# Patient Record
Sex: FEMALE | Race: WHITE | NOT HISPANIC OR LATINO | Employment: OTHER | URBAN - METROPOLITAN AREA
[De-identification: names, ages, dates, MRNs, and addresses within clinical notes are randomized per-mention and may not be internally consistent; named-entity substitution may affect disease eponyms.]

---

## 2020-01-01 ENCOUNTER — HOSPITAL ENCOUNTER (INPATIENT)
Facility: HOSPITAL | Age: 85
LOS: 8 days | Discharge: NURSING, REHAB OR LONG-TERM CARE | End: 2020-01-22
Attending: EMERGENCY MEDICINE | Admitting: HOSPITALIST

## 2020-01-01 ENCOUNTER — APPOINTMENT (OUTPATIENT)
Dept: NUCLEAR MEDICINE | Facility: HOSPITAL | Age: 85
End: 2020-01-01

## 2020-01-01 ENCOUNTER — APPOINTMENT (OUTPATIENT)
Dept: MRI IMAGING | Facility: HOSPITAL | Age: 85
End: 2020-01-01

## 2020-01-01 ENCOUNTER — ANESTHESIA EVENT (OUTPATIENT)
Dept: PERIOP | Facility: HOSPITAL | Age: 85
End: 2020-01-01

## 2020-01-01 ENCOUNTER — ANESTHESIA (OUTPATIENT)
Dept: PERIOP | Facility: HOSPITAL | Age: 85
End: 2020-01-01

## 2020-01-01 ENCOUNTER — APPOINTMENT (OUTPATIENT)
Dept: GENERAL RADIOLOGY | Facility: HOSPITAL | Age: 85
End: 2020-01-01

## 2020-01-01 ENCOUNTER — APPOINTMENT (OUTPATIENT)
Dept: CT IMAGING | Facility: HOSPITAL | Age: 85
End: 2020-01-01

## 2020-01-01 ENCOUNTER — HOSPITAL ENCOUNTER (INPATIENT)
Facility: HOSPITAL | Age: 85
LOS: 4 days | Discharge: NURSING, REHAB OR LONG-TERM CARE | End: 2020-04-09
Attending: EMERGENCY MEDICINE | Admitting: HOSPITALIST

## 2020-01-01 ENCOUNTER — HOSPITAL ENCOUNTER (INPATIENT)
Facility: HOSPITAL | Age: 85
LOS: 7 days | End: 2020-04-30
Attending: EMERGENCY MEDICINE | Admitting: ORTHOPAEDIC SURGERY

## 2020-01-01 ENCOUNTER — APPOINTMENT (OUTPATIENT)
Dept: CARDIOLOGY | Facility: HOSPITAL | Age: 85
End: 2020-01-01

## 2020-01-01 ENCOUNTER — HOSPITAL ENCOUNTER (EMERGENCY)
Facility: HOSPITAL | Age: 85
Discharge: HOME | End: 2020-04-02
Attending: EMERGENCY MEDICINE | Admitting: EMERGENCY MEDICINE

## 2020-01-01 ENCOUNTER — HOSPITAL ENCOUNTER (INPATIENT)
Facility: HOSPITAL | Age: 85
LOS: 3 days | End: 2020-05-03
Attending: HOSPITALIST | Admitting: HOSPITALIST

## 2020-01-01 VITALS
RESPIRATION RATE: 20 BRPM | WEIGHT: 186.07 LBS | HEIGHT: 63 IN | TEMPERATURE: 97.1 F | DIASTOLIC BLOOD PRESSURE: 62 MMHG | BODY MASS INDEX: 32.97 KG/M2 | OXYGEN SATURATION: 97 % | SYSTOLIC BLOOD PRESSURE: 125 MMHG | HEART RATE: 80 BPM

## 2020-01-01 VITALS
WEIGHT: 163.58 LBS | SYSTOLIC BLOOD PRESSURE: 120 MMHG | OXYGEN SATURATION: 94 % | TEMPERATURE: 98.5 F | DIASTOLIC BLOOD PRESSURE: 53 MMHG | HEART RATE: 77 BPM | RESPIRATION RATE: 18 BRPM | HEIGHT: 64 IN | BODY MASS INDEX: 27.93 KG/M2

## 2020-01-01 VITALS
OXYGEN SATURATION: 95 % | HEART RATE: 70 BPM | SYSTOLIC BLOOD PRESSURE: 140 MMHG | TEMPERATURE: 98.7 F | DIASTOLIC BLOOD PRESSURE: 72 MMHG | RESPIRATION RATE: 16 BRPM

## 2020-01-01 VITALS
RESPIRATION RATE: 14 BRPM | TEMPERATURE: 98 F | BODY MASS INDEX: 29.45 KG/M2 | HEART RATE: 70 BPM | DIASTOLIC BLOOD PRESSURE: 58 MMHG | WEIGHT: 166.2 LBS | HEIGHT: 63 IN | OXYGEN SATURATION: 94 % | SYSTOLIC BLOOD PRESSURE: 109 MMHG

## 2020-01-01 VITALS
TEMPERATURE: 101.9 F | SYSTOLIC BLOOD PRESSURE: 98 MMHG | WEIGHT: 186.07 LBS | HEIGHT: 63 IN | BODY MASS INDEX: 32.97 KG/M2 | DIASTOLIC BLOOD PRESSURE: 52 MMHG

## 2020-01-01 DIAGNOSIS — Z74.09 IMPAIRED MOBILITY: ICD-10-CM

## 2020-01-01 DIAGNOSIS — K56.609 SBO (SMALL BOWEL OBSTRUCTION) (HCC): ICD-10-CM

## 2020-01-01 DIAGNOSIS — M16.11 OSTEOARTHRITIS OF RIGHT HIP, UNSPECIFIED OSTEOARTHRITIS TYPE: ICD-10-CM

## 2020-01-01 DIAGNOSIS — T81.40XA POSTOPERATIVE INFECTION: ICD-10-CM

## 2020-01-01 DIAGNOSIS — D72.829 LEUKOCYTOSIS, UNSPECIFIED TYPE: ICD-10-CM

## 2020-01-01 DIAGNOSIS — L03.115 CELLULITIS OF RIGHT LOWER EXTREMITY: Primary | ICD-10-CM
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
## 2020-01-01 DIAGNOSIS — R10.31 RIGHT GROIN PAIN: Primary | ICD-10-CM

## 2020-01-01 DIAGNOSIS — T79.6XXA TRAUMATIC RHABDOMYOLYSIS, INITIAL ENCOUNTER (HCC): Primary | ICD-10-CM

## 2020-01-01 DIAGNOSIS — R79.82 ELEVATED C-REACTIVE PROTEIN (CRP): ICD-10-CM

## 2020-01-01 DIAGNOSIS — M25.551 RIGHT HIP PAIN: Primary | ICD-10-CM

## 2020-01-01 DIAGNOSIS — S82.841A CLOSED BIMALLEOLAR FRACTURE OF RIGHT ANKLE, INITIAL ENCOUNTER: ICD-10-CM

## 2020-01-01 DIAGNOSIS — S91.001A WOUND OF RIGHT ANKLE, INITIAL ENCOUNTER: ICD-10-CM

## 2020-01-01 LAB
ABO GROUP BLD: NORMAL
ALBUMIN SERPL-MCNC: 1.9 G/DL (ref 3.5–5.2)
ALBUMIN SERPL-MCNC: 2.3 G/DL (ref 3.5–5.2)
ALBUMIN SERPL-MCNC: 2.5 G/DL (ref 3.5–5.2)
ALBUMIN SERPL-MCNC: 2.7 G/DL (ref 3.5–5.2)
ALBUMIN SERPL-MCNC: 2.8 G/DL (ref 3.5–5.2)
ALBUMIN SERPL-MCNC: 3.2 G/DL (ref 3.5–5.2)
ALBUMIN SERPL-MCNC: 3.3 G/DL (ref 3.5–5.2)
ALBUMIN SERPL-MCNC: 3.7 G/DL (ref 3.5–5.2)
ALBUMIN/GLOB SERPL: 0.6 G/DL
ALBUMIN/GLOB SERPL: 0.7 G/DL
ALBUMIN/GLOB SERPL: 0.8 G/DL
ALBUMIN/GLOB SERPL: 1.1 G/DL
ALBUMIN/GLOB SERPL: 1.1 G/DL
ALBUMIN/GLOB SERPL: 1.2 G/DL
ALBUMIN/GLOB SERPL: 1.3 G/DL
ALP SERPL-CCNC: 46 U/L (ref 39–117)
ALP SERPL-CCNC: 54 U/L (ref 39–117)
ALP SERPL-CCNC: 61 U/L (ref 39–117)
ALP SERPL-CCNC: 68 U/L (ref 39–117)
ALP SERPL-CCNC: 75 U/L (ref 39–117)
ALP SERPL-CCNC: 77 U/L (ref 39–117)
ALP SERPL-CCNC: 78 U/L (ref 39–117)
ALT SERPL W P-5'-P-CCNC: 15 U/L (ref 1–33)
ALT SERPL W P-5'-P-CCNC: 16 U/L (ref 1–33)
ALT SERPL W P-5'-P-CCNC: 5 U/L (ref 1–33)
ALT SERPL W P-5'-P-CCNC: 9 U/L (ref 1–33)
ALT SERPL W P-5'-P-CCNC: <5 U/L (ref 1–33)
ANION GAP SERPL CALCULATED.3IONS-SCNC: 10.2 MMOL/L (ref 5–15)
ANION GAP SERPL CALCULATED.3IONS-SCNC: 10.2 MMOL/L (ref 5–15)
ANION GAP SERPL CALCULATED.3IONS-SCNC: 10.5 MMOL/L (ref 5–15)
ANION GAP SERPL CALCULATED.3IONS-SCNC: 11.2 MMOL/L (ref 5–15)
ANION GAP SERPL CALCULATED.3IONS-SCNC: 11.4 MMOL/L (ref 5–15)
ANION GAP SERPL CALCULATED.3IONS-SCNC: 11.4 MMOL/L (ref 5–15)
ANION GAP SERPL CALCULATED.3IONS-SCNC: 12.3 MMOL/L (ref 5–15)
ANION GAP SERPL CALCULATED.3IONS-SCNC: 12.6 MMOL/L (ref 5–15)
ANION GAP SERPL CALCULATED.3IONS-SCNC: 13.6 MMOL/L (ref 5–15)
ANION GAP SERPL CALCULATED.3IONS-SCNC: 14 MMOL/L (ref 5–15)
ANION GAP SERPL CALCULATED.3IONS-SCNC: 19.1 MMOL/L (ref 5–15)
ANION GAP SERPL CALCULATED.3IONS-SCNC: 4.9 MMOL/L (ref 5–15)
ANION GAP SERPL CALCULATED.3IONS-SCNC: 7.9 MMOL/L (ref 5–15)
ANION GAP SERPL CALCULATED.3IONS-SCNC: 8 MMOL/L (ref 5–15)
ANION GAP SERPL CALCULATED.3IONS-SCNC: 8.3 MMOL/L (ref 5–15)
ANION GAP SERPL CALCULATED.3IONS-SCNC: 8.5 MMOL/L (ref 5–15)
ANION GAP SERPL CALCULATED.3IONS-SCNC: 8.8 MMOL/L (ref 5–15)
ANION GAP SERPL CALCULATED.3IONS-SCNC: 8.9 MMOL/L (ref 5–15)
ANION GAP SERPL CALCULATED.3IONS-SCNC: 9.3 MMOL/L (ref 5–15)
ANION GAP SERPL CALCULATED.3IONS-SCNC: 9.5 MMOL/L (ref 5–15)
ANTI-C: NORMAL
ANTI-D: NORMAL
AORTIC DIMENSIONLESS INDEX: 0.2 (DI)
AST SERPL-CCNC: 13 U/L (ref 1–32)
AST SERPL-CCNC: 14 U/L (ref 1–32)
AST SERPL-CCNC: 15 U/L (ref 1–32)
AST SERPL-CCNC: 15 U/L (ref 1–32)
AST SERPL-CCNC: 23 U/L (ref 1–32)
AST SERPL-CCNC: 41 U/L (ref 1–32)
AST SERPL-CCNC: 7 U/L (ref 1–32)
BACTERIA BLD CULT: ABNORMAL
BACTERIA SPEC AEROBE CULT: ABNORMAL
BACTERIA SPEC AEROBE CULT: NORMAL
BACTERIA SPEC ANAEROBE CULT: NORMAL
BASOPHILS # BLD AUTO: 0.03 10*3/MM3 (ref 0–0.2)
BASOPHILS # BLD AUTO: 0.04 10*3/MM3 (ref 0–0.2)
BASOPHILS # BLD AUTO: 0.05 10*3/MM3 (ref 0–0.2)
BASOPHILS # BLD AUTO: 0.06 10*3/MM3 (ref 0–0.2)
BASOPHILS # BLD AUTO: 0.07 10*3/MM3 (ref 0–0.2)
BASOPHILS # BLD AUTO: 0.08 10*3/MM3 (ref 0–0.2)
BASOPHILS NFR BLD AUTO: 0.2 % (ref 0–1.5)
BASOPHILS NFR BLD AUTO: 0.3 % (ref 0–1.5)
BASOPHILS NFR BLD AUTO: 0.4 % (ref 0–1.5)
BASOPHILS NFR BLD AUTO: 0.5 % (ref 0–1.5)
BASOPHILS NFR BLD AUTO: 0.6 % (ref 0–1.5)
BASOPHILS NFR BLD AUTO: 0.7 % (ref 0–1.5)
BASOPHILS NFR BLD AUTO: 0.7 % (ref 0–1.5)
BASOPHILS NFR BLD AUTO: 0.8 % (ref 0–1.5)
BH BB BLOOD EXPIRATION DATE: NORMAL
BH BB BLOOD EXPIRATION DATE: NORMAL
BH BB BLOOD TYPE BARCODE: 600
BH BB BLOOD TYPE BARCODE: 600
BH BB DISPENSE STATUS: NORMAL
BH BB DISPENSE STATUS: NORMAL
BH BB PRODUCT CODE: NORMAL
BH BB PRODUCT CODE: NORMAL
BH BB UNIT NUMBER: NORMAL
BH BB UNIT NUMBER: NORMAL
BH CV ECHO MEAS - ACS: 0.7 CM
BH CV ECHO MEAS - AI DEC SLOPE: 424 CM/SEC^2
BH CV ECHO MEAS - AI MAX PG: 85 MMHG
BH CV ECHO MEAS - AI MAX VEL: 461 CM/SEC
BH CV ECHO MEAS - AI P1/2T: 318.5 MSEC
BH CV ECHO MEAS - AO MAX PG (FULL): 61.8 MMHG
BH CV ECHO MEAS - AO MAX PG: 66 MMHG
BH CV ECHO MEAS - AO MEAN PG (FULL): 37 MMHG
BH CV ECHO MEAS - AO MEAN PG: 39 MMHG
BH CV ECHO MEAS - AO ROOT AREA (BSA CORRECTED): 1.8
BH CV ECHO MEAS - AO ROOT AREA: 7.1 CM^2
BH CV ECHO MEAS - AO ROOT DIAM: 3 CM
BH CV ECHO MEAS - AO V2 MAX: 406 CM/SEC
BH CV ECHO MEAS - AO V2 MEAN: 295 CM/SEC
BH CV ECHO MEAS - AO V2 VTI: 88.4 CM
BH CV ECHO MEAS - AVA(I,A): 0.72 CM^2
BH CV ECHO MEAS - AVA(I,D): 0.72 CM^2
BH CV ECHO MEAS - AVA(V,A): 0.79 CM^2
BH CV ECHO MEAS - AVA(V,D): 0.79 CM^2
BH CV ECHO MEAS - BSA(HAYCOCK): 1.7 M^2
BH CV ECHO MEAS - BSA: 1.7 M^2
BH CV ECHO MEAS - BZI_BMI: 25.5 KILOGRAMS/M^2
BH CV ECHO MEAS - BZI_METRIC_HEIGHT: 160 CM
BH CV ECHO MEAS - BZI_METRIC_WEIGHT: 65.3 KG
BH CV ECHO MEAS - DESC AORTA: 1.7 CM
BH CV ECHO MEAS - EDV(CUBED): 103.8 ML
BH CV ECHO MEAS - EDV(MOD-SP2): 64 ML
BH CV ECHO MEAS - EDV(MOD-SP4): 96 ML
BH CV ECHO MEAS - EDV(TEICH): 102.4 ML
BH CV ECHO MEAS - EF(CUBED): 71.3 %
BH CV ECHO MEAS - EF(MOD-BP): 57 %
BH CV ECHO MEAS - EF(MOD-SP2): 57.8 %
BH CV ECHO MEAS - EF(MOD-SP4): 54.2 %
BH CV ECHO MEAS - EF(TEICH): 63 %
BH CV ECHO MEAS - ESV(CUBED): 29.8 ML
BH CV ECHO MEAS - ESV(MOD-SP2): 27 ML
BH CV ECHO MEAS - ESV(MOD-SP4): 44 ML
BH CV ECHO MEAS - ESV(TEICH): 37.9 ML
BH CV ECHO MEAS - FS: 34 %
BH CV ECHO MEAS - IVS/LVPW: 1.1
BH CV ECHO MEAS - IVSD: 1 CM
BH CV ECHO MEAS - LAT PEAK E' VEL: 8 CM/SEC
BH CV ECHO MEAS - LV DIASTOLIC VOL/BSA (35-75): 57.1 ML/M^2
BH CV ECHO MEAS - LV MASS(C)D: 153.4 GRAMS
BH CV ECHO MEAS - LV MASS(C)DI: 91.2 GRAMS/M^2
BH CV ECHO MEAS - LV MAX PG: 4.2 MMHG
BH CV ECHO MEAS - LV MEAN PG: 2 MMHG
BH CV ECHO MEAS - LV SYSTOLIC VOL/BSA (12-30): 26.2 ML/M^2
BH CV ECHO MEAS - LV V1 MAX: 102 CM/SEC
BH CV ECHO MEAS - LV V1 MEAN: 66.2 CM/SEC
BH CV ECHO MEAS - LV V1 VTI: 20.2 CM
BH CV ECHO MEAS - LVIDD: 4.7 CM
BH CV ECHO MEAS - LVIDS: 3.1 CM
BH CV ECHO MEAS - LVLD AP2: 7 CM
BH CV ECHO MEAS - LVLD AP4: 7.7 CM
BH CV ECHO MEAS - LVLS AP2: 6.2 CM
BH CV ECHO MEAS - LVLS AP4: 6.6 CM
BH CV ECHO MEAS - LVOT AREA (M): 3.1 CM^2
BH CV ECHO MEAS - LVOT AREA: 3.1 CM^2
BH CV ECHO MEAS - LVOT DIAM: 2 CM
BH CV ECHO MEAS - LVPWD: 0.9 CM
BH CV ECHO MEAS - MED PEAK E' VEL: 6 CM/SEC
BH CV ECHO MEAS - MR MAX PG: 157.3 MMHG
BH CV ECHO MEAS - MR MAX VEL: 627 CM/SEC
BH CV ECHO MEAS - MV A DUR: 0.16 SEC
BH CV ECHO MEAS - MV A MAX VEL: 107 CM/SEC
BH CV ECHO MEAS - MV DEC SLOPE: 633 CM/SEC^2
BH CV ECHO MEAS - MV DEC TIME: 160 SEC
BH CV ECHO MEAS - MV E MAX VEL: 88.8 CM/SEC
BH CV ECHO MEAS - MV E/A: 0.83
BH CV ECHO MEAS - MV MAX PG: 9.4 MMHG
BH CV ECHO MEAS - MV MEAN PG: 4 MMHG
BH CV ECHO MEAS - MV P1/2T MAX VEL: 112 CM/SEC
BH CV ECHO MEAS - MV P1/2T: 51.8 MSEC
BH CV ECHO MEAS - MV V2 MAX: 153 CM/SEC
BH CV ECHO MEAS - MV V2 MEAN: 88.8 CM/SEC
BH CV ECHO MEAS - MV V2 VTI: 28.1 CM
BH CV ECHO MEAS - MVA P1/2T LCG: 2 CM^2
BH CV ECHO MEAS - MVA(P1/2T): 4.2 CM^2
BH CV ECHO MEAS - MVA(VTI): 2.3 CM^2
BH CV ECHO MEAS - PA ACC TIME: 0.07 SEC
BH CV ECHO MEAS - PA MAX PG (FULL): 4.4 MMHG
BH CV ECHO MEAS - PA MAX PG: 9 MMHG
BH CV ECHO MEAS - PA PR(ACCEL): 47.1 MMHG
BH CV ECHO MEAS - PA V2 MAX: 150 CM/SEC
BH CV ECHO MEAS - PULM A REVS DUR: 0.19 SEC
BH CV ECHO MEAS - PULM A REVS VEL: 36.7 CM/SEC
BH CV ECHO MEAS - PULM DIAS VEL: 43.8 CM/SEC
BH CV ECHO MEAS - PULM S/D: 0.86
BH CV ECHO MEAS - PULM SYS VEL: 37.6 CM/SEC
BH CV ECHO MEAS - PVA(V,A): 1.8 CM^2
BH CV ECHO MEAS - PVA(V,D): 1.8 CM^2
BH CV ECHO MEAS - QP/QS: 0.78
BH CV ECHO MEAS - RAP SYSTOLE: 3 MMHG
BH CV ECHO MEAS - RV MAX PG: 4.6 MMHG
BH CV ECHO MEAS - RV MEAN PG: 2 MMHG
BH CV ECHO MEAS - RV V1 MAX: 107 CM/SEC
BH CV ECHO MEAS - RV V1 MEAN: 67 CM/SEC
BH CV ECHO MEAS - RV V1 VTI: 19.4 CM
BH CV ECHO MEAS - RVOT AREA: 2.5 CM^2
BH CV ECHO MEAS - RVOT DIAM: 1.8 CM
BH CV ECHO MEAS - SI(AO): 371.5 ML/M^2
BH CV ECHO MEAS - SI(CUBED): 44 ML/M^2
BH CV ECHO MEAS - SI(LVOT): 37.7 ML/M^2
BH CV ECHO MEAS - SI(MOD-SP2): 22 ML/M^2
BH CV ECHO MEAS - SI(MOD-SP4): 30.9 ML/M^2
BH CV ECHO MEAS - SI(TEICH): 38.3 ML/M^2
BH CV ECHO MEAS - SV(AO): 624.9 ML
BH CV ECHO MEAS - SV(CUBED): 74 ML
BH CV ECHO MEAS - SV(LVOT): 63.5 ML
BH CV ECHO MEAS - SV(MOD-SP2): 37 ML
BH CV ECHO MEAS - SV(MOD-SP4): 52 ML
BH CV ECHO MEAS - SV(RVOT): 49.4 ML
BH CV ECHO MEAS - SV(TEICH): 64.4 ML
BH CV ECHO MEASUREMENTS AVERAGE E/E' RATIO: 12.69
BH CV VAS BP RIGHT ARM: NORMAL MMHG
BH CV XLRA - RV BASE: 2.7 CM
BH CV XLRA - RV LENGTH: 7.2 CM
BH CV XLRA - RV MID: 1.6 CM
BH CV XLRA - TDI S': 20 CM/SEC
BILIRUB SERPL-MCNC: 0.3 MG/DL (ref 0.2–1.2)
BILIRUB SERPL-MCNC: 0.3 MG/DL (ref 0.2–1.2)
BILIRUB SERPL-MCNC: 0.5 MG/DL (ref 0.2–1.2)
BILIRUB SERPL-MCNC: 0.5 MG/DL (ref 0.2–1.2)
BILIRUB SERPL-MCNC: 0.7 MG/DL (ref 0.2–1.2)
BILIRUB SERPL-MCNC: 0.8 MG/DL (ref 0.2–1.2)
BILIRUB SERPL-MCNC: 1.4 MG/DL (ref 0.2–1.2)
BILIRUB UR QL STRIP: NEGATIVE
BILIRUB UR QL STRIP: NEGATIVE
BLD GP AB SCN SERPL QL: POSITIVE
BUN BLD-MCNC: 10 MG/DL (ref 8–23)
BUN BLD-MCNC: 12 MG/DL (ref 8–23)
BUN BLD-MCNC: 12 MG/DL (ref 8–23)
BUN BLD-MCNC: 13 MG/DL (ref 8–23)
BUN BLD-MCNC: 15 MG/DL (ref 8–23)
BUN BLD-MCNC: 16 MG/DL (ref 8–23)
BUN BLD-MCNC: 20 MG/DL (ref 8–23)
BUN BLD-MCNC: 21 MG/DL (ref 8–23)
BUN BLD-MCNC: 23 MG/DL (ref 8–23)
BUN BLD-MCNC: 8 MG/DL (ref 8–23)
BUN BLD-MCNC: 8 MG/DL (ref 8–23)
BUN BLD-MCNC: 9 MG/DL (ref 8–23)
BUN BLD-MCNC: 9 MG/DL (ref 8–23)
BUN/CREAT SERPL: 11.4 (ref 7–25)
BUN/CREAT SERPL: 11.9 (ref 7–25)
BUN/CREAT SERPL: 12.2 (ref 7–25)
BUN/CREAT SERPL: 13.6 (ref 7–25)
BUN/CREAT SERPL: 14.3 (ref 7–25)
BUN/CREAT SERPL: 14.8 (ref 7–25)
BUN/CREAT SERPL: 14.8 (ref 7–25)
BUN/CREAT SERPL: 15.1 (ref 7–25)
BUN/CREAT SERPL: 15.1 (ref 7–25)
BUN/CREAT SERPL: 17 (ref 7–25)
BUN/CREAT SERPL: 17.2 (ref 7–25)
BUN/CREAT SERPL: 17.2 (ref 7–25)
BUN/CREAT SERPL: 17.8 (ref 7–25)
BUN/CREAT SERPL: 17.9 (ref 7–25)
BUN/CREAT SERPL: 19.1 (ref 7–25)
BUN/CREAT SERPL: 19.5 (ref 7–25)
BUN/CREAT SERPL: 19.8 (ref 7–25)
BUN/CREAT SERPL: 20 (ref 7–25)
BUN/CREAT SERPL: 20 (ref 7–25)
BUN/CREAT SERPL: 20.3 (ref 7–25)
C AG RBC QL: NEGATIVE
CALCIUM SPEC-SCNC: 7.9 MG/DL (ref 8.2–9.6)
CALCIUM SPEC-SCNC: 8.1 MG/DL (ref 8.2–9.6)
CALCIUM SPEC-SCNC: 8.1 MG/DL (ref 8.2–9.6)
CALCIUM SPEC-SCNC: 8.2 MG/DL (ref 8.2–9.6)
CALCIUM SPEC-SCNC: 8.2 MG/DL (ref 8.2–9.6)
CALCIUM SPEC-SCNC: 8.3 MG/DL (ref 8.2–9.6)
CALCIUM SPEC-SCNC: 8.4 MG/DL (ref 8.2–9.6)
CALCIUM SPEC-SCNC: 8.5 MG/DL (ref 8.2–9.6)
CALCIUM SPEC-SCNC: 8.6 MG/DL (ref 8.2–9.6)
CALCIUM SPEC-SCNC: 8.9 MG/DL (ref 8.2–9.6)
CALCIUM SPEC-SCNC: 9 MG/DL (ref 8.2–9.6)
CALCIUM SPEC-SCNC: 9.7 MG/DL (ref 8.2–9.6)
CHLORIDE SERPL-SCNC: 100 MMOL/L (ref 98–107)
CHLORIDE SERPL-SCNC: 101 MMOL/L (ref 98–107)
CHLORIDE SERPL-SCNC: 102 MMOL/L (ref 98–107)
CHLORIDE SERPL-SCNC: 103 MMOL/L (ref 98–107)
CHLORIDE SERPL-SCNC: 104 MMOL/L (ref 98–107)
CHLORIDE SERPL-SCNC: 104 MMOL/L (ref 98–107)
CHLORIDE SERPL-SCNC: 105 MMOL/L (ref 98–107)
CHLORIDE SERPL-SCNC: 97 MMOL/L (ref 98–107)
CHLORIDE SERPL-SCNC: 98 MMOL/L (ref 98–107)
CHLORIDE SERPL-SCNC: 99 MMOL/L (ref 98–107)
CHOLEST SERPL-MCNC: 110 MG/DL (ref 0–200)
CHOLEST SERPL-MCNC: 116 MG/DL (ref 0–200)
CK SERPL-CCNC: 1063 U/L (ref 20–180)
CK SERPL-CCNC: 107 U/L (ref 20–180)
CK SERPL-CCNC: 153 U/L (ref 20–180)
CK SERPL-CCNC: 207 U/L (ref 20–180)
CK SERPL-CCNC: 253 U/L (ref 20–180)
CK SERPL-CCNC: 319 U/L (ref 20–180)
CK SERPL-CCNC: 387 U/L (ref 20–180)
CLARITY UR: CLEAR
CLARITY UR: CLEAR
CO2 SERPL-SCNC: 15.9 MMOL/L (ref 22–29)
CO2 SERPL-SCNC: 19 MMOL/L (ref 22–29)
CO2 SERPL-SCNC: 19.4 MMOL/L (ref 22–29)
CO2 SERPL-SCNC: 23.5 MMOL/L (ref 22–29)
CO2 SERPL-SCNC: 23.6 MMOL/L (ref 22–29)
CO2 SERPL-SCNC: 23.7 MMOL/L (ref 22–29)
CO2 SERPL-SCNC: 24.6 MMOL/L (ref 22–29)
CO2 SERPL-SCNC: 25.2 MMOL/L (ref 22–29)
CO2 SERPL-SCNC: 25.4 MMOL/L (ref 22–29)
CO2 SERPL-SCNC: 25.7 MMOL/L (ref 22–29)
CO2 SERPL-SCNC: 26.1 MMOL/L (ref 22–29)
CO2 SERPL-SCNC: 26.7 MMOL/L (ref 22–29)
CO2 SERPL-SCNC: 26.8 MMOL/L (ref 22–29)
CO2 SERPL-SCNC: 27.1 MMOL/L (ref 22–29)
CO2 SERPL-SCNC: 27.5 MMOL/L (ref 22–29)
CO2 SERPL-SCNC: 27.5 MMOL/L (ref 22–29)
CO2 SERPL-SCNC: 27.8 MMOL/L (ref 22–29)
CO2 SERPL-SCNC: 28 MMOL/L (ref 22–29)
CO2 SERPL-SCNC: 28.1 MMOL/L (ref 22–29)
CO2 SERPL-SCNC: 28.8 MMOL/L (ref 22–29)
COLOR UR: YELLOW
COLOR UR: YELLOW
CREAT BLD-MCNC: 0.53 MG/DL (ref 0.57–1)
CREAT BLD-MCNC: 0.61 MG/DL (ref 0.57–1)
CREAT BLD-MCNC: 0.64 MG/DL (ref 0.57–1)
CREAT BLD-MCNC: 0.65 MG/DL (ref 0.57–1)
CREAT BLD-MCNC: 0.66 MG/DL (ref 0.57–1)
CREAT BLD-MCNC: 0.67 MG/DL (ref 0.57–1)
CREAT BLD-MCNC: 0.67 MG/DL (ref 0.57–1)
CREAT BLD-MCNC: 0.8 MG/DL (ref 0.57–1)
CREAT BLD-MCNC: 0.81 MG/DL (ref 0.57–1)
CREAT BLD-MCNC: 0.82 MG/DL (ref 0.57–1)
CREAT BLD-MCNC: 0.82 MG/DL (ref 0.57–1)
CREAT BLD-MCNC: 0.84 MG/DL (ref 0.57–1)
CREAT BLD-MCNC: 0.86 MG/DL (ref 0.57–1)
CREAT BLD-MCNC: 0.87 MG/DL (ref 0.57–1)
CREAT BLD-MCNC: 0.9 MG/DL (ref 0.57–1)
CREAT BLD-MCNC: 0.93 MG/DL (ref 0.57–1)
CREAT BLD-MCNC: 0.94 MG/DL (ref 0.57–1)
CREAT BLD-MCNC: 1.1 MG/DL (ref 0.57–1)
CREAT BLD-MCNC: 1.35 MG/DL (ref 0.57–1)
CREAT BLD-MCNC: 2.01 MG/DL (ref 0.57–1)
CROSSMATCH INTERPRETATION: NORMAL
CROSSMATCH INTERPRETATION: NORMAL
CRP SERPL-MCNC: 0.92 MG/DL (ref 0–0.5)
CRP SERPL-MCNC: 8.93 MG/DL (ref 0–0.5)
D-LACTATE SERPL-SCNC: 1.1 MMOL/L (ref 0.5–2)
D-LACTATE SERPL-SCNC: 1.7 MMOL/L (ref 0.5–2)
DEPRECATED RDW RBC AUTO: 40.4 FL (ref 37–54)
DEPRECATED RDW RBC AUTO: 41 FL (ref 37–54)
DEPRECATED RDW RBC AUTO: 41 FL (ref 37–54)
DEPRECATED RDW RBC AUTO: 41.3 FL (ref 37–54)
DEPRECATED RDW RBC AUTO: 42.1 FL (ref 37–54)
DEPRECATED RDW RBC AUTO: 42.3 FL (ref 37–54)
DEPRECATED RDW RBC AUTO: 42.7 FL (ref 37–54)
DEPRECATED RDW RBC AUTO: 43.6 FL (ref 37–54)
DEPRECATED RDW RBC AUTO: 44.1 FL (ref 37–54)
DEPRECATED RDW RBC AUTO: 44.1 FL (ref 37–54)
DEPRECATED RDW RBC AUTO: 44.2 FL (ref 37–54)
DEPRECATED RDW RBC AUTO: 44.7 FL (ref 37–54)
DEPRECATED RDW RBC AUTO: 44.9 FL (ref 37–54)
DEPRECATED RDW RBC AUTO: 45 FL (ref 37–54)
DEPRECATED RDW RBC AUTO: 45 FL (ref 37–54)
DEPRECATED RDW RBC AUTO: 45.5 FL (ref 37–54)
DEPRECATED RDW RBC AUTO: 45.8 FL (ref 37–54)
DEPRECATED RDW RBC AUTO: 45.9 FL (ref 37–54)
DEPRECATED RDW RBC AUTO: 46 FL (ref 37–54)
DEPRECATED RDW RBC AUTO: 47.2 FL (ref 37–54)
EOSINOPHIL # BLD AUTO: 0 10*3/MM3 (ref 0–0.4)
EOSINOPHIL # BLD AUTO: 0.01 10*3/MM3 (ref 0–0.4)
EOSINOPHIL # BLD AUTO: 0.04 10*3/MM3 (ref 0–0.4)
EOSINOPHIL # BLD AUTO: 0.15 10*3/MM3 (ref 0–0.4)
EOSINOPHIL # BLD AUTO: 0.2 10*3/MM3 (ref 0–0.4)
EOSINOPHIL # BLD AUTO: 0.22 10*3/MM3 (ref 0–0.4)
EOSINOPHIL # BLD AUTO: 0.25 10*3/MM3 (ref 0–0.4)
EOSINOPHIL # BLD AUTO: 0.26 10*3/MM3 (ref 0–0.4)
EOSINOPHIL # BLD AUTO: 0.29 10*3/MM3 (ref 0–0.4)
EOSINOPHIL # BLD AUTO: 0.36 10*3/MM3 (ref 0–0.4)
EOSINOPHIL # BLD AUTO: 0.45 10*3/MM3 (ref 0–0.4)
EOSINOPHIL # BLD AUTO: 0.6 10*3/MM3 (ref 0–0.4)
EOSINOPHIL # BLD AUTO: 0.73 10*3/MM3 (ref 0–0.4)
EOSINOPHIL # BLD AUTO: 0.76 10*3/MM3 (ref 0–0.4)
EOSINOPHIL # BLD AUTO: 1 10*3/MM3 (ref 0–0.4)
EOSINOPHIL # BLD AUTO: 1.02 10*3/MM3 (ref 0–0.4)
EOSINOPHIL NFR BLD AUTO: 0 % (ref 0.3–6.2)
EOSINOPHIL NFR BLD AUTO: 0.1 % (ref 0.3–6.2)
EOSINOPHIL NFR BLD AUTO: 0.2 % (ref 0.3–6.2)
EOSINOPHIL NFR BLD AUTO: 1.3 % (ref 0.3–6.2)
EOSINOPHIL NFR BLD AUTO: 2.3 % (ref 0.3–6.2)
EOSINOPHIL NFR BLD AUTO: 2.4 % (ref 0.3–6.2)
EOSINOPHIL NFR BLD AUTO: 2.5 % (ref 0.3–6.2)
EOSINOPHIL NFR BLD AUTO: 3 % (ref 0.3–6.2)
EOSINOPHIL NFR BLD AUTO: 3.2 % (ref 0.3–6.2)
EOSINOPHIL NFR BLD AUTO: 3.5 % (ref 0.3–6.2)
EOSINOPHIL NFR BLD AUTO: 3.8 % (ref 0.3–6.2)
EOSINOPHIL NFR BLD AUTO: 4.2 % (ref 0.3–6.2)
EOSINOPHIL NFR BLD AUTO: 7.2 % (ref 0.3–6.2)
EOSINOPHIL NFR BLD AUTO: 9.4 % (ref 0.3–6.2)
EOSINOPHIL NFR BLD AUTO: 9.5 % (ref 0.3–6.2)
EOSINOPHIL NFR BLD AUTO: 9.7 % (ref 0.3–6.2)
ERYTHROCYTE [DISTWIDTH] IN BLOOD BY AUTOMATED COUNT: 13 % (ref 12.3–15.4)
ERYTHROCYTE [DISTWIDTH] IN BLOOD BY AUTOMATED COUNT: 13.1 % (ref 12.3–15.4)
ERYTHROCYTE [DISTWIDTH] IN BLOOD BY AUTOMATED COUNT: 13.2 % (ref 12.3–15.4)
ERYTHROCYTE [DISTWIDTH] IN BLOOD BY AUTOMATED COUNT: 13.3 % (ref 12.3–15.4)
ERYTHROCYTE [DISTWIDTH] IN BLOOD BY AUTOMATED COUNT: 13.6 % (ref 12.3–15.4)
ERYTHROCYTE [DISTWIDTH] IN BLOOD BY AUTOMATED COUNT: 13.6 % (ref 12.3–15.4)
ERYTHROCYTE [DISTWIDTH] IN BLOOD BY AUTOMATED COUNT: 13.8 % (ref 12.3–15.4)
ERYTHROCYTE [DISTWIDTH] IN BLOOD BY AUTOMATED COUNT: 13.9 % (ref 12.3–15.4)
ERYTHROCYTE [DISTWIDTH] IN BLOOD BY AUTOMATED COUNT: 14 % (ref 12.3–15.4)
ERYTHROCYTE [DISTWIDTH] IN BLOOD BY AUTOMATED COUNT: 14.1 % (ref 12.3–15.4)
ERYTHROCYTE [DISTWIDTH] IN BLOOD BY AUTOMATED COUNT: 14.1 % (ref 12.3–15.4)
ERYTHROCYTE [DISTWIDTH] IN BLOOD BY AUTOMATED COUNT: 14.2 % (ref 12.3–15.4)
ERYTHROCYTE [DISTWIDTH] IN BLOOD BY AUTOMATED COUNT: 14.4 % (ref 12.3–15.4)
ERYTHROCYTE [DISTWIDTH] IN BLOOD BY AUTOMATED COUNT: 14.7 % (ref 12.3–15.4)
ERYTHROCYTE [SEDIMENTATION RATE] IN BLOOD: 10 MM/HR (ref 0–30)
ERYTHROCYTE [SEDIMENTATION RATE] IN BLOOD: 11 MM/HR (ref 0–30)
GFR SERPL CREATININE-BSD FRML MDRD: 107 ML/MIN/1.73
GFR SERPL CREATININE-BSD FRML MDRD: 23 ML/MIN/1.73
GFR SERPL CREATININE-BSD FRML MDRD: 36 ML/MIN/1.73
GFR SERPL CREATININE-BSD FRML MDRD: 46 ML/MIN/1.73
GFR SERPL CREATININE-BSD FRML MDRD: 55 ML/MIN/1.73
GFR SERPL CREATININE-BSD FRML MDRD: 56 ML/MIN/1.73
GFR SERPL CREATININE-BSD FRML MDRD: 58 ML/MIN/1.73
GFR SERPL CREATININE-BSD FRML MDRD: 60 ML/MIN/1.73
GFR SERPL CREATININE-BSD FRML MDRD: 61 ML/MIN/1.73
GFR SERPL CREATININE-BSD FRML MDRD: 63 ML/MIN/1.73
GFR SERPL CREATININE-BSD FRML MDRD: 65 ML/MIN/1.73
GFR SERPL CREATININE-BSD FRML MDRD: 65 ML/MIN/1.73
GFR SERPL CREATININE-BSD FRML MDRD: 66 ML/MIN/1.73
GFR SERPL CREATININE-BSD FRML MDRD: 67 ML/MIN/1.73
GFR SERPL CREATININE-BSD FRML MDRD: 82 ML/MIN/1.73
GFR SERPL CREATININE-BSD FRML MDRD: 82 ML/MIN/1.73
GFR SERPL CREATININE-BSD FRML MDRD: 83 ML/MIN/1.73
GFR SERPL CREATININE-BSD FRML MDRD: 85 ML/MIN/1.73
GFR SERPL CREATININE-BSD FRML MDRD: 86 ML/MIN/1.73
GFR SERPL CREATININE-BSD FRML MDRD: 91 ML/MIN/1.73
GLOBULIN UR ELPH-MCNC: 2.3 GM/DL
GLOBULIN UR ELPH-MCNC: 2.6 GM/DL
GLOBULIN UR ELPH-MCNC: 2.9 GM/DL
GLOBULIN UR ELPH-MCNC: 3.1 GM/DL
GLOBULIN UR ELPH-MCNC: 3.1 GM/DL
GLOBULIN UR ELPH-MCNC: 3.4 GM/DL
GLOBULIN UR ELPH-MCNC: 3.9 GM/DL
GLUCOSE BLD-MCNC: 100 MG/DL (ref 65–99)
GLUCOSE BLD-MCNC: 101 MG/DL (ref 65–99)
GLUCOSE BLD-MCNC: 101 MG/DL (ref 65–99)
GLUCOSE BLD-MCNC: 103 MG/DL (ref 65–99)
GLUCOSE BLD-MCNC: 105 MG/DL (ref 65–99)
GLUCOSE BLD-MCNC: 112 MG/DL (ref 65–99)
GLUCOSE BLD-MCNC: 115 MG/DL (ref 65–99)
GLUCOSE BLD-MCNC: 119 MG/DL (ref 65–99)
GLUCOSE BLD-MCNC: 136 MG/DL (ref 65–99)
GLUCOSE BLD-MCNC: 80 MG/DL (ref 65–99)
GLUCOSE BLD-MCNC: 82 MG/DL (ref 65–99)
GLUCOSE BLD-MCNC: 84 MG/DL (ref 65–99)
GLUCOSE BLD-MCNC: 90 MG/DL (ref 65–99)
GLUCOSE BLD-MCNC: 91 MG/DL (ref 65–99)
GLUCOSE BLD-MCNC: 97 MG/DL (ref 65–99)
GLUCOSE BLD-MCNC: 98 MG/DL (ref 65–99)
GLUCOSE UR STRIP-MCNC: NEGATIVE MG/DL
GLUCOSE UR STRIP-MCNC: NEGATIVE MG/DL
GRAM STN SPEC: ABNORMAL
HBA1C MFR BLD: 4.7 % (ref 4.8–5.6)
HBA1C MFR BLD: 5 % (ref 4.8–5.6)
HCT VFR BLD AUTO: 23.8 % (ref 34–46.6)
HCT VFR BLD AUTO: 24.9 % (ref 34–46.6)
HCT VFR BLD AUTO: 26.3 % (ref 34–46.6)
HCT VFR BLD AUTO: 26.3 % (ref 34–46.6)
HCT VFR BLD AUTO: 28.3 % (ref 34–46.6)
HCT VFR BLD AUTO: 29.1 % (ref 34–46.6)
HCT VFR BLD AUTO: 30 % (ref 34–46.6)
HCT VFR BLD AUTO: 30.4 % (ref 34–46.6)
HCT VFR BLD AUTO: 30.9 % (ref 34–46.6)
HCT VFR BLD AUTO: 31 % (ref 34–46.6)
HCT VFR BLD AUTO: 31.3 % (ref 34–46.6)
HCT VFR BLD AUTO: 31.9 % (ref 34–46.6)
HCT VFR BLD AUTO: 32 % (ref 34–46.6)
HCT VFR BLD AUTO: 32.4 % (ref 34–46.6)
HCT VFR BLD AUTO: 32.8 % (ref 34–46.6)
HCT VFR BLD AUTO: 33.4 % (ref 34–46.6)
HCT VFR BLD AUTO: 33.6 % (ref 34–46.6)
HCT VFR BLD AUTO: 34.6 % (ref 34–46.6)
HCT VFR BLD AUTO: 35 % (ref 34–46.6)
HCT VFR BLD AUTO: 39.4 % (ref 34–46.6)
HDLC SERPL-MCNC: 61 MG/DL (ref 40–60)
HDLC SERPL-MCNC: 66 MG/DL (ref 40–60)
HGB BLD-MCNC: 10.2 G/DL (ref 12–15.9)
HGB BLD-MCNC: 10.2 G/DL (ref 12–15.9)
HGB BLD-MCNC: 10.3 G/DL (ref 12–15.9)
HGB BLD-MCNC: 10.5 G/DL (ref 12–15.9)
HGB BLD-MCNC: 10.7 G/DL (ref 12–15.9)
HGB BLD-MCNC: 10.8 G/DL (ref 12–15.9)
HGB BLD-MCNC: 11 G/DL (ref 12–15.9)
HGB BLD-MCNC: 11 G/DL (ref 12–15.9)
HGB BLD-MCNC: 11.1 G/DL (ref 12–15.9)
HGB BLD-MCNC: 11.2 G/DL (ref 12–15.9)
HGB BLD-MCNC: 11.4 G/DL (ref 12–15.9)
HGB BLD-MCNC: 13.2 G/DL (ref 12–15.9)
HGB BLD-MCNC: 7.7 G/DL (ref 12–15.9)
HGB BLD-MCNC: 8.2 G/DL (ref 12–15.9)
HGB BLD-MCNC: 8.8 G/DL (ref 12–15.9)
HGB BLD-MCNC: 8.9 G/DL (ref 12–15.9)
HGB BLD-MCNC: 9.3 G/DL (ref 12–15.9)
HGB BLD-MCNC: 9.5 G/DL (ref 12–15.9)
HGB UR QL STRIP.AUTO: NEGATIVE
HGB UR QL STRIP.AUTO: NEGATIVE
IMM GRANULOCYTES # BLD AUTO: 0.03 10*3/MM3 (ref 0–0.05)
IMM GRANULOCYTES # BLD AUTO: 0.03 10*3/MM3 (ref 0–0.05)
IMM GRANULOCYTES # BLD AUTO: 0.04 10*3/MM3 (ref 0–0.05)
IMM GRANULOCYTES # BLD AUTO: 0.04 10*3/MM3 (ref 0–0.05)
IMM GRANULOCYTES # BLD AUTO: 0.05 10*3/MM3 (ref 0–0.05)
IMM GRANULOCYTES # BLD AUTO: 0.06 10*3/MM3 (ref 0–0.05)
IMM GRANULOCYTES # BLD AUTO: 0.07 10*3/MM3 (ref 0–0.05)
IMM GRANULOCYTES # BLD AUTO: 0.09 10*3/MM3 (ref 0–0.05)
IMM GRANULOCYTES # BLD AUTO: 0.09 10*3/MM3 (ref 0–0.05)
IMM GRANULOCYTES # BLD AUTO: 0.1 10*3/MM3 (ref 0–0.05)
IMM GRANULOCYTES # BLD AUTO: 0.11 10*3/MM3 (ref 0–0.05)
IMM GRANULOCYTES # BLD AUTO: 0.12 10*3/MM3 (ref 0–0.05)
IMM GRANULOCYTES NFR BLD AUTO: 0.4 % (ref 0–0.5)
IMM GRANULOCYTES NFR BLD AUTO: 0.5 % (ref 0–0.5)
IMM GRANULOCYTES NFR BLD AUTO: 0.6 % (ref 0–0.5)
IMM GRANULOCYTES NFR BLD AUTO: 0.7 % (ref 0–0.5)
IMM GRANULOCYTES NFR BLD AUTO: 0.7 % (ref 0–0.5)
IMM GRANULOCYTES NFR BLD AUTO: 0.8 % (ref 0–0.5)
IMM GRANULOCYTES NFR BLD AUTO: 0.8 % (ref 0–0.5)
IMM GRANULOCYTES NFR BLD AUTO: 1 % (ref 0–0.5)
ISOLATED FROM: ABNORMAL
KETONES UR QL STRIP: ABNORMAL
KETONES UR QL STRIP: NEGATIVE
LDLC SERPL CALC-MCNC: 38 MG/DL (ref 0–100)
LDLC SERPL CALC-MCNC: 39 MG/DL (ref 0–100)
LDLC/HDLC SERPL: 0.58 {RATIO}
LDLC/HDLC SERPL: 0.63 {RATIO}
LEFT ATRIUM VOLUME INDEX: 43 ML/M2
LEFT ATRIUM VOLUME: 67 CM3
LEUKOCYTE ESTERASE UR QL STRIP.AUTO: NEGATIVE
LEUKOCYTE ESTERASE UR QL STRIP.AUTO: NEGATIVE
LYMPHOCYTES # BLD AUTO: 0.99 10*3/MM3 (ref 0.7–3.1)
LYMPHOCYTES # BLD AUTO: 1.06 10*3/MM3 (ref 0.7–3.1)
LYMPHOCYTES # BLD AUTO: 1.15 10*3/MM3 (ref 0.7–3.1)
LYMPHOCYTES # BLD AUTO: 1.19 10*3/MM3 (ref 0.7–3.1)
LYMPHOCYTES # BLD AUTO: 1.25 10*3/MM3 (ref 0.7–3.1)
LYMPHOCYTES # BLD AUTO: 1.46 10*3/MM3 (ref 0.7–3.1)
LYMPHOCYTES # BLD AUTO: 1.5 10*3/MM3 (ref 0.7–3.1)
LYMPHOCYTES # BLD AUTO: 1.55 10*3/MM3 (ref 0.7–3.1)
LYMPHOCYTES # BLD AUTO: 1.59 10*3/MM3 (ref 0.7–3.1)
LYMPHOCYTES # BLD AUTO: 1.71 10*3/MM3 (ref 0.7–3.1)
LYMPHOCYTES # BLD AUTO: 1.72 10*3/MM3 (ref 0.7–3.1)
LYMPHOCYTES # BLD AUTO: 1.85 10*3/MM3 (ref 0.7–3.1)
LYMPHOCYTES # BLD AUTO: 1.87 10*3/MM3 (ref 0.7–3.1)
LYMPHOCYTES # BLD AUTO: 1.9 10*3/MM3 (ref 0.7–3.1)
LYMPHOCYTES # BLD AUTO: 2.03 10*3/MM3 (ref 0.7–3.1)
LYMPHOCYTES # BLD AUTO: 2.12 10*3/MM3 (ref 0.7–3.1)
LYMPHOCYTES NFR BLD AUTO: 12 % (ref 19.6–45.3)
LYMPHOCYTES NFR BLD AUTO: 12.8 % (ref 19.6–45.3)
LYMPHOCYTES NFR BLD AUTO: 13.4 % (ref 19.6–45.3)
LYMPHOCYTES NFR BLD AUTO: 14.4 % (ref 19.6–45.3)
LYMPHOCYTES NFR BLD AUTO: 15.3 % (ref 19.6–45.3)
LYMPHOCYTES NFR BLD AUTO: 17.1 % (ref 19.6–45.3)
LYMPHOCYTES NFR BLD AUTO: 18 % (ref 19.6–45.3)
LYMPHOCYTES NFR BLD AUTO: 18 % (ref 19.6–45.3)
LYMPHOCYTES NFR BLD AUTO: 19.3 % (ref 19.6–45.3)
LYMPHOCYTES NFR BLD AUTO: 19.9 % (ref 19.6–45.3)
LYMPHOCYTES NFR BLD AUTO: 20.6 % (ref 19.6–45.3)
LYMPHOCYTES NFR BLD AUTO: 22.5 % (ref 19.6–45.3)
LYMPHOCYTES NFR BLD AUTO: 7.3 % (ref 19.6–45.3)
LYMPHOCYTES NFR BLD AUTO: 7.9 % (ref 19.6–45.3)
LYMPHOCYTES NFR BLD AUTO: 8.4 % (ref 19.6–45.3)
LYMPHOCYTES NFR BLD AUTO: 9.7 % (ref 19.6–45.3)
MAGNESIUM SERPL-MCNC: 1.5 MG/DL (ref 1.7–2.3)
MAGNESIUM SERPL-MCNC: 1.6 MG/DL (ref 1.7–2.3)
MAGNESIUM SERPL-MCNC: 1.8 MG/DL (ref 1.7–2.3)
MAGNESIUM SERPL-MCNC: 1.9 MG/DL (ref 1.7–2.3)
MAGNESIUM SERPL-MCNC: 2 MG/DL (ref 1.7–2.3)
MAXIMAL PREDICTED HEART RATE: 124 BPM
MCH RBC QN AUTO: 28.2 PG (ref 26.6–33)
MCH RBC QN AUTO: 28.6 PG (ref 26.6–33)
MCH RBC QN AUTO: 28.7 PG (ref 26.6–33)
MCH RBC QN AUTO: 28.8 PG (ref 26.6–33)
MCH RBC QN AUTO: 28.9 PG (ref 26.6–33)
MCH RBC QN AUTO: 28.9 PG (ref 26.6–33)
MCH RBC QN AUTO: 29 PG (ref 26.6–33)
MCH RBC QN AUTO: 29 PG (ref 26.6–33)
MCH RBC QN AUTO: 29.1 PG (ref 26.6–33)
MCH RBC QN AUTO: 29.1 PG (ref 26.6–33)
MCH RBC QN AUTO: 29.3 PG (ref 26.6–33)
MCH RBC QN AUTO: 29.3 PG (ref 26.6–33)
MCH RBC QN AUTO: 29.4 PG (ref 26.6–33)
MCH RBC QN AUTO: 29.6 PG (ref 26.6–33)
MCH RBC QN AUTO: 29.6 PG (ref 26.6–33)
MCH RBC QN AUTO: 29.7 PG (ref 26.6–33)
MCHC RBC AUTO-ENTMCNC: 32 G/DL (ref 31.5–35.7)
MCHC RBC AUTO-ENTMCNC: 32.4 G/DL (ref 31.5–35.7)
MCHC RBC AUTO-ENTMCNC: 32.4 G/DL (ref 31.5–35.7)
MCHC RBC AUTO-ENTMCNC: 32.6 G/DL (ref 31.5–35.7)
MCHC RBC AUTO-ENTMCNC: 32.6 G/DL (ref 31.5–35.7)
MCHC RBC AUTO-ENTMCNC: 32.9 G/DL (ref 31.5–35.7)
MCHC RBC AUTO-ENTMCNC: 33 G/DL (ref 31.5–35.7)
MCHC RBC AUTO-ENTMCNC: 33.2 G/DL (ref 31.5–35.7)
MCHC RBC AUTO-ENTMCNC: 33.3 G/DL (ref 31.5–35.7)
MCHC RBC AUTO-ENTMCNC: 33.3 G/DL (ref 31.5–35.7)
MCHC RBC AUTO-ENTMCNC: 33.5 G/DL (ref 31.5–35.7)
MCHC RBC AUTO-ENTMCNC: 33.6 G/DL (ref 31.5–35.7)
MCHC RBC AUTO-ENTMCNC: 33.8 G/DL (ref 31.5–35.7)
MCHC RBC AUTO-ENTMCNC: 34 G/DL (ref 31.5–35.7)
MCHC RBC AUTO-ENTMCNC: 34.4 G/DL (ref 31.5–35.7)
MCV RBC AUTO: 84.6 FL (ref 79–97)
MCV RBC AUTO: 85.7 FL (ref 79–97)
MCV RBC AUTO: 86 FL (ref 79–97)
MCV RBC AUTO: 86.1 FL (ref 79–97)
MCV RBC AUTO: 86.2 FL (ref 79–97)
MCV RBC AUTO: 86.4 FL (ref 79–97)
MCV RBC AUTO: 86.6 FL (ref 79–97)
MCV RBC AUTO: 86.8 FL (ref 79–97)
MCV RBC AUTO: 87 FL (ref 79–97)
MCV RBC AUTO: 87.1 FL (ref 79–97)
MCV RBC AUTO: 87.4 FL (ref 79–97)
MCV RBC AUTO: 87.4 FL (ref 79–97)
MCV RBC AUTO: 87.6 FL (ref 79–97)
MCV RBC AUTO: 87.7 FL (ref 79–97)
MCV RBC AUTO: 88.4 FL (ref 79–97)
MCV RBC AUTO: 88.6 FL (ref 79–97)
MCV RBC AUTO: 88.6 FL (ref 79–97)
MCV RBC AUTO: 90.4 FL (ref 79–97)
MCV RBC AUTO: 90.8 FL (ref 79–97)
MCV RBC AUTO: 91.5 FL (ref 79–97)
MONOCYTES # BLD AUTO: 0.46 10*3/MM3 (ref 0.1–0.9)
MONOCYTES # BLD AUTO: 0.66 10*3/MM3 (ref 0.1–0.9)
MONOCYTES # BLD AUTO: 0.67 10*3/MM3 (ref 0.1–0.9)
MONOCYTES # BLD AUTO: 0.74 10*3/MM3 (ref 0.1–0.9)
MONOCYTES # BLD AUTO: 0.76 10*3/MM3 (ref 0.1–0.9)
MONOCYTES # BLD AUTO: 0.77 10*3/MM3 (ref 0.1–0.9)
MONOCYTES # BLD AUTO: 0.81 10*3/MM3 (ref 0.1–0.9)
MONOCYTES # BLD AUTO: 0.82 10*3/MM3 (ref 0.1–0.9)
MONOCYTES # BLD AUTO: 0.83 10*3/MM3 (ref 0.1–0.9)
MONOCYTES # BLD AUTO: 0.84 10*3/MM3 (ref 0.1–0.9)
MONOCYTES # BLD AUTO: 0.87 10*3/MM3 (ref 0.1–0.9)
MONOCYTES # BLD AUTO: 0.94 10*3/MM3 (ref 0.1–0.9)
MONOCYTES # BLD AUTO: 0.98 10*3/MM3 (ref 0.1–0.9)
MONOCYTES # BLD AUTO: 1.03 10*3/MM3 (ref 0.1–0.9)
MONOCYTES # BLD AUTO: 1.04 10*3/MM3 (ref 0.1–0.9)
MONOCYTES # BLD AUTO: 1.26 10*3/MM3 (ref 0.1–0.9)
MONOCYTES NFR BLD AUTO: 10.2 % (ref 5–12)
MONOCYTES NFR BLD AUTO: 10.7 % (ref 5–12)
MONOCYTES NFR BLD AUTO: 4.9 % (ref 5–12)
MONOCYTES NFR BLD AUTO: 5.8 % (ref 5–12)
MONOCYTES NFR BLD AUTO: 6 % (ref 5–12)
MONOCYTES NFR BLD AUTO: 6.1 % (ref 5–12)
MONOCYTES NFR BLD AUTO: 6.4 % (ref 5–12)
MONOCYTES NFR BLD AUTO: 7.2 % (ref 5–12)
MONOCYTES NFR BLD AUTO: 7.7 % (ref 5–12)
MONOCYTES NFR BLD AUTO: 7.8 % (ref 5–12)
MONOCYTES NFR BLD AUTO: 8 % (ref 5–12)
MONOCYTES NFR BLD AUTO: 8.1 % (ref 5–12)
MONOCYTES NFR BLD AUTO: 8.2 % (ref 5–12)
MONOCYTES NFR BLD AUTO: 8.3 % (ref 5–12)
MONOCYTES NFR BLD AUTO: 8.4 % (ref 5–12)
MONOCYTES NFR BLD AUTO: 9.8 % (ref 5–12)
NEUTROPHILS # BLD AUTO: 10.68 10*3/MM3 (ref 1.7–7)
NEUTROPHILS # BLD AUTO: 10.73 10*3/MM3 (ref 1.7–7)
NEUTROPHILS # BLD AUTO: 10.75 10*3/MM3 (ref 1.7–7)
NEUTROPHILS # BLD AUTO: 12.5 10*3/MM3 (ref 1.7–7)
NEUTROPHILS # BLD AUTO: 14.72 10*3/MM3 (ref 1.7–7)
NEUTROPHILS # BLD AUTO: 4.66 10*3/MM3 (ref 1.7–7)
NEUTROPHILS # BLD AUTO: 5.53 10*3/MM3 (ref 1.7–7)
NEUTROPHILS # BLD AUTO: 5.95 10*3/MM3 (ref 1.7–7)
NEUTROPHILS # BLD AUTO: 6.12 10*3/MM3 (ref 1.7–7)
NEUTROPHILS # BLD AUTO: 6.34 10*3/MM3 (ref 1.7–7)
NEUTROPHILS # BLD AUTO: 6.57 10*3/MM3 (ref 1.7–7)
NEUTROPHILS # BLD AUTO: 6.59 10*3/MM3 (ref 1.7–7)
NEUTROPHILS # BLD AUTO: 6.67 10*3/MM3 (ref 1.7–7)
NEUTROPHILS # BLD AUTO: 6.69 10*3/MM3 (ref 1.7–7)
NEUTROPHILS # BLD AUTO: 8.06 10*3/MM3 (ref 1.7–7)
NEUTROPHILS # BLD AUTO: 9.61 10*3/MM3 (ref 1.7–7)
NEUTROPHILS NFR BLD AUTO: 60.8 % (ref 42.7–76)
NEUTROPHILS NFR BLD AUTO: 61.4 % (ref 42.7–76)
NEUTROPHILS NFR BLD AUTO: 62.5 % (ref 42.7–76)
NEUTROPHILS NFR BLD AUTO: 63.4 % (ref 42.7–76)
NEUTROPHILS NFR BLD AUTO: 66.6 % (ref 42.7–76)
NEUTROPHILS NFR BLD AUTO: 67.5 % (ref 42.7–76)
NEUTROPHILS NFR BLD AUTO: 69.7 % (ref 42.7–76)
NEUTROPHILS NFR BLD AUTO: 69.8 % (ref 42.7–76)
NEUTROPHILS NFR BLD AUTO: 74.5 % (ref 42.7–76)
NEUTROPHILS NFR BLD AUTO: 74.6 % (ref 42.7–76)
NEUTROPHILS NFR BLD AUTO: 76.5 % (ref 42.7–76)
NEUTROPHILS NFR BLD AUTO: 76.6 % (ref 42.7–76)
NEUTROPHILS NFR BLD AUTO: 81.3 % (ref 42.7–76)
NEUTROPHILS NFR BLD AUTO: 82.8 % (ref 42.7–76)
NEUTROPHILS NFR BLD AUTO: 84.6 % (ref 42.7–76)
NEUTROPHILS NFR BLD AUTO: 86.6 % (ref 42.7–76)
NITRITE UR QL STRIP: NEGATIVE
NITRITE UR QL STRIP: NEGATIVE
NONSPECIFIC GEL REACTION: NORMAL
NRBC BLD AUTO-RTO: 0 /100 WBC (ref 0–0.2)
NT-PROBNP SERPL-MCNC: 4670 PG/ML (ref 5–1800)
NT-PROBNP SERPL-MCNC: 5905 PG/ML (ref 5–1800)
PH UR STRIP.AUTO: 6.5 [PH] (ref 5–8)
PH UR STRIP.AUTO: 7 [PH] (ref 5–8)
PHOSPHATE SERPL-MCNC: 2.5 MG/DL (ref 2.5–4.5)
PLATELET # BLD AUTO: 263 10*3/MM3 (ref 140–450)
PLATELET # BLD AUTO: 271 10*3/MM3 (ref 140–450)
PLATELET # BLD AUTO: 287 10*3/MM3 (ref 140–450)
PLATELET # BLD AUTO: 301 10*3/MM3 (ref 140–450)
PLATELET # BLD AUTO: 331 10*3/MM3 (ref 140–450)
PLATELET # BLD AUTO: 338 10*3/MM3 (ref 140–450)
PLATELET # BLD AUTO: 347 10*3/MM3 (ref 140–450)
PLATELET # BLD AUTO: 348 10*3/MM3 (ref 140–450)
PLATELET # BLD AUTO: 351 10*3/MM3 (ref 140–450)
PLATELET # BLD AUTO: 372 10*3/MM3 (ref 140–450)
PLATELET # BLD AUTO: 395 10*3/MM3 (ref 140–450)
PLATELET # BLD AUTO: 449 10*3/MM3 (ref 140–450)
PLATELET # BLD AUTO: 508 10*3/MM3 (ref 140–450)
PLATELET # BLD AUTO: 523 10*3/MM3 (ref 140–450)
PLATELET # BLD AUTO: 530 10*3/MM3 (ref 140–450)
PLATELET # BLD AUTO: 544 10*3/MM3 (ref 140–450)
PLATELET # BLD AUTO: 546 10*3/MM3 (ref 140–450)
PLATELET # BLD AUTO: 547 10*3/MM3 (ref 140–450)
PLATELET # BLD AUTO: 635 10*3/MM3 (ref 140–450)
PLATELET # BLD AUTO: 644 10*3/MM3 (ref 140–450)
PMV BLD AUTO: 10 FL (ref 6–12)
PMV BLD AUTO: 9 FL (ref 6–12)
PMV BLD AUTO: 9.1 FL (ref 6–12)
PMV BLD AUTO: 9.2 FL (ref 6–12)
PMV BLD AUTO: 9.3 FL (ref 6–12)
PMV BLD AUTO: 9.3 FL (ref 6–12)
PMV BLD AUTO: 9.4 FL (ref 6–12)
PMV BLD AUTO: 9.5 FL (ref 6–12)
PMV BLD AUTO: 9.5 FL (ref 6–12)
PMV BLD AUTO: 9.6 FL (ref 6–12)
PMV BLD AUTO: 9.6 FL (ref 6–12)
PMV BLD AUTO: 9.7 FL (ref 6–12)
PMV BLD AUTO: 9.8 FL (ref 6–12)
POTASSIUM BLD-SCNC: 3.4 MMOL/L (ref 3.5–5.2)
POTASSIUM BLD-SCNC: 3.5 MMOL/L (ref 3.5–5.2)
POTASSIUM BLD-SCNC: 3.8 MMOL/L (ref 3.5–5.2)
POTASSIUM BLD-SCNC: 3.9 MMOL/L (ref 3.5–5.2)
POTASSIUM BLD-SCNC: 3.9 MMOL/L (ref 3.5–5.2)
POTASSIUM BLD-SCNC: 4 MMOL/L (ref 3.5–5.2)
POTASSIUM BLD-SCNC: 4 MMOL/L (ref 3.5–5.2)
POTASSIUM BLD-SCNC: 4.1 MMOL/L (ref 3.5–5.2)
POTASSIUM BLD-SCNC: 4.2 MMOL/L (ref 3.5–5.2)
POTASSIUM BLD-SCNC: 4.3 MMOL/L (ref 3.5–5.2)
POTASSIUM BLD-SCNC: 4.4 MMOL/L (ref 3.5–5.2)
POTASSIUM BLD-SCNC: 4.4 MMOL/L (ref 3.5–5.2)
POTASSIUM BLD-SCNC: 4.5 MMOL/L (ref 3.5–5.2)
POTASSIUM BLD-SCNC: 4.5 MMOL/L (ref 3.5–5.2)
POTASSIUM BLD-SCNC: 4.6 MMOL/L (ref 3.5–5.2)
PROCALCITONIN SERPL-MCNC: 0.08 NG/ML (ref 0.1–0.25)
PROT SERPL-MCNC: 5 G/DL (ref 6–8.5)
PROT SERPL-MCNC: 5 G/DL (ref 6–8.5)
PROT SERPL-MCNC: 5.6 G/DL (ref 6–8.5)
PROT SERPL-MCNC: 5.9 G/DL (ref 6–8.5)
PROT SERPL-MCNC: 6.1 G/DL (ref 6–8.5)
PROT SERPL-MCNC: 6.7 G/DL (ref 6–8.5)
PROT SERPL-MCNC: 7.1 G/DL (ref 6–8.5)
PROT UR QL STRIP: NEGATIVE
PROT UR QL STRIP: NEGATIVE
RBC # BLD AUTO: 2.6 10*6/MM3 (ref 3.77–5.28)
RBC # BLD AUTO: 2.86 10*6/MM3 (ref 3.77–5.28)
RBC # BLD AUTO: 3.03 10*6/MM3 (ref 3.77–5.28)
RBC # BLD AUTO: 3.07 10*6/MM3 (ref 3.77–5.28)
RBC # BLD AUTO: 3.23 10*6/MM3 (ref 3.77–5.28)
RBC # BLD AUTO: 3.32 10*6/MM3 (ref 3.77–5.28)
RBC # BLD AUTO: 3.48 10*6/MM3 (ref 3.77–5.28)
RBC # BLD AUTO: 3.51 10*6/MM3 (ref 3.77–5.28)
RBC # BLD AUTO: 3.54 10*6/MM3 (ref 3.77–5.28)
RBC # BLD AUTO: 3.59 10*6/MM3 (ref 3.77–5.28)
RBC # BLD AUTO: 3.59 10*6/MM3 (ref 3.77–5.28)
RBC # BLD AUTO: 3.6 10*6/MM3 (ref 3.77–5.28)
RBC # BLD AUTO: 3.63 10*6/MM3 (ref 3.77–5.28)
RBC # BLD AUTO: 3.72 10*6/MM3 (ref 3.77–5.28)
RBC # BLD AUTO: 3.81 10*6/MM3 (ref 3.77–5.28)
RBC # BLD AUTO: 3.82 10*6/MM3 (ref 3.77–5.28)
RBC # BLD AUTO: 3.83 10*6/MM3 (ref 3.77–5.28)
RBC # BLD AUTO: 3.86 10*6/MM3 (ref 3.77–5.28)
RBC # BLD AUTO: 3.95 10*6/MM3 (ref 3.77–5.28)
RBC # BLD AUTO: 4.51 10*6/MM3 (ref 3.77–5.28)
RH BLD: NEGATIVE
SODIUM BLD-SCNC: 131 MMOL/L (ref 136–145)
SODIUM BLD-SCNC: 133 MMOL/L (ref 136–145)
SODIUM BLD-SCNC: 133 MMOL/L (ref 136–145)
SODIUM BLD-SCNC: 134 MMOL/L (ref 136–145)
SODIUM BLD-SCNC: 135 MMOL/L (ref 136–145)
SODIUM BLD-SCNC: 136 MMOL/L (ref 136–145)
SODIUM BLD-SCNC: 138 MMOL/L (ref 136–145)
SODIUM BLD-SCNC: 139 MMOL/L (ref 136–145)
SODIUM BLD-SCNC: 139 MMOL/L (ref 136–145)
SODIUM BLD-SCNC: 140 MMOL/L (ref 136–145)
SODIUM BLD-SCNC: 142 MMOL/L (ref 136–145)
SP GR UR STRIP: 1.01 (ref 1–1.03)
SP GR UR STRIP: >=1.03 (ref 1–1.03)
STRESS TARGET HR: 105 BPM
T&S EXPIRATION DATE: NORMAL
T4 FREE SERPL-MCNC: 1.12 NG/DL (ref 0.93–1.7)
TRIGL SERPL-MCNC: 52 MG/DL (ref 0–150)
TRIGL SERPL-MCNC: 58 MG/DL (ref 0–150)
TROPONIN T SERPL-MCNC: 0.02 NG/ML (ref 0–0.03)
TROPONIN T SERPL-MCNC: 0.03 NG/ML (ref 0–0.03)
TROPONIN T SERPL-MCNC: 0.03 NG/ML (ref 0–0.03)
TROPONIN T SERPL-MCNC: <0.01 NG/ML (ref 0–0.03)
TSH SERPL DL<=0.05 MIU/L-ACNC: 0.53 UIU/ML (ref 0.27–4.2)
TSH SERPL DL<=0.05 MIU/L-ACNC: 1.91 UIU/ML (ref 0.27–4.2)
TSH SERPL DL<=0.05 MIU/L-ACNC: 6.27 UIU/ML (ref 0.27–4.2)
UNIT  ABO: NORMAL
UNIT  ABO: NORMAL
UNIT  RH: NORMAL
UNIT  RH: NORMAL
URATE SERPL-MCNC: 7.1 MG/DL (ref 2.4–5.7)
URINE MYOGLOBIN, QUALITATIVE: POSITIVE
UROBILINOGEN UR QL STRIP: ABNORMAL
UROBILINOGEN UR QL STRIP: NORMAL
VANCOMYCIN SERPL-MCNC: 20.6 MCG/ML (ref 5–40)
VANCOMYCIN SERPL-MCNC: 26.2 MCG/ML (ref 5–40)
VANCOMYCIN TROUGH SERPL-MCNC: 12.5 MCG/ML (ref 5–20)
VANCOMYCIN TROUGH SERPL-MCNC: 17.2 MCG/ML (ref 5–20)
VLDLC SERPL-MCNC: 10.4 MG/DL (ref 5–40)
VLDLC SERPL-MCNC: 11.6 MG/DL (ref 5–40)
WBC NRBC COR # BLD: 10.53 10*3/MM3 (ref 3.4–10.8)
WBC NRBC COR # BLD: 10.54 10*3/MM3 (ref 3.4–10.8)
WBC NRBC COR # BLD: 10.67 10*3/MM3 (ref 3.4–10.8)
WBC NRBC COR # BLD: 10.8 10*3/MM3 (ref 3.4–10.8)
WBC NRBC COR # BLD: 11.45 10*3/MM3 (ref 3.4–10.8)
WBC NRBC COR # BLD: 11.83 10*3/MM3 (ref 3.4–10.8)
WBC NRBC COR # BLD: 12.31 10*3/MM3 (ref 3.4–10.8)
WBC NRBC COR # BLD: 12.67 10*3/MM3 (ref 3.4–10.8)
WBC NRBC COR # BLD: 13.95 10*3/MM3 (ref 3.4–10.8)
WBC NRBC COR # BLD: 14.41 10*3/MM3 (ref 3.4–10.8)
WBC NRBC COR # BLD: 15.1 10*3/MM3 (ref 3.4–10.8)
WBC NRBC COR # BLD: 17.03 10*3/MM3 (ref 3.4–10.8)
WBC NRBC COR # BLD: 18.64 10*3/MM3 (ref 3.4–10.8)
WBC NRBC COR # BLD: 22.95 10*3/MM3 (ref 3.4–10.8)
WBC NRBC COR # BLD: 7.66 10*3/MM3 (ref 3.4–10.8)
WBC NRBC COR # BLD: 8.28 10*3/MM3 (ref 3.4–10.8)
WBC NRBC COR # BLD: 8.3 10*3/MM3 (ref 3.4–10.8)
WBC NRBC COR # BLD: 8.77 10*3/MM3 (ref 3.4–10.8)
WBC NRBC COR # BLD: 8.81 10*3/MM3 (ref 3.4–10.8)
WBC NRBC COR # BLD: 9.57 10*3/MM3 (ref 3.4–10.8)

## 2020-01-01 PROCEDURE — 85652 RBC SED RATE AUTOMATED: CPT | Performed by: NURSE PRACTITIONER

## 2020-01-01 PROCEDURE — 25010000002 ONDANSETRON PER 1 MG: Performed by: INTERNAL MEDICINE

## 2020-01-01 PROCEDURE — 82550 ASSAY OF CK (CPK): CPT | Performed by: HOSPITALIST

## 2020-01-01 PROCEDURE — 93005 ELECTROCARDIOGRAM TRACING: CPT | Performed by: INTERNAL MEDICINE

## 2020-01-01 PROCEDURE — 80048 BASIC METABOLIC PNL TOTAL CA: CPT | Performed by: ORTHOPAEDIC SURGERY

## 2020-01-01 PROCEDURE — G0378 HOSPITAL OBSERVATION PER HR: HCPCS

## 2020-01-01 PROCEDURE — 80048 BASIC METABOLIC PNL TOTAL CA: CPT | Performed by: HOSPITALIST

## 2020-01-01 PROCEDURE — C1769 GUIDE WIRE: HCPCS | Performed by: ORTHOPAEDIC SURGERY

## 2020-01-01 PROCEDURE — 25010000002 MORPHINE PER 10 MG: Performed by: EMERGENCY MEDICINE

## 2020-01-01 PROCEDURE — 25010000002 LORAZEPAM PER 2 MG: Performed by: HOSPITALIST

## 2020-01-01 PROCEDURE — 80053 COMPREHEN METABOLIC PANEL: CPT | Performed by: NURSE PRACTITIONER

## 2020-01-01 PROCEDURE — 73610 X-RAY EXAM OF ANKLE: CPT

## 2020-01-01 PROCEDURE — 83735 ASSAY OF MAGNESIUM: CPT | Performed by: NURSE PRACTITIONER

## 2020-01-01 PROCEDURE — 0QSJ04Z REPOSITION RIGHT FIBULA WITH INTERNAL FIXATION DEVICE, OPEN APPROACH: ICD-10-PCS | Performed by: ORTHOPAEDIC SURGERY

## 2020-01-01 PROCEDURE — 87147 CULTURE TYPE IMMUNOLOGIC: CPT | Performed by: NURSE PRACTITIONER

## 2020-01-01 PROCEDURE — 85025 COMPLETE CBC W/AUTO DIFF WBC: CPT | Performed by: ORTHOPAEDIC SURGERY

## 2020-01-01 PROCEDURE — 99223 1ST HOSP IP/OBS HIGH 75: CPT | Performed by: INTERNAL MEDICINE

## 2020-01-01 PROCEDURE — 83605 ASSAY OF LACTIC ACID: CPT | Performed by: EMERGENCY MEDICINE

## 2020-01-01 PROCEDURE — 25010000002 MORPHINE PER 10 MG: Performed by: HOSPITALIST

## 2020-01-01 PROCEDURE — 97110 THERAPEUTIC EXERCISES: CPT

## 2020-01-01 PROCEDURE — 83036 HEMOGLOBIN GLYCOSYLATED A1C: CPT | Performed by: HOSPITALIST

## 2020-01-01 PROCEDURE — 49587 PR REPAIR UMBILICAL HERN,5+Y/O,STRANG: CPT | Performed by: SURGERY

## 2020-01-01 PROCEDURE — 97165 OT EVAL LOW COMPLEX 30 MIN: CPT

## 2020-01-01 PROCEDURE — 87077 CULTURE AEROBIC IDENTIFY: CPT | Performed by: ORTHOPAEDIC SURGERY

## 2020-01-01 PROCEDURE — 80048 BASIC METABOLIC PNL TOTAL CA: CPT | Performed by: NURSE PRACTITIONER

## 2020-01-01 PROCEDURE — 25010000002 DIGOXIN PER 500 MCG: Performed by: INTERNAL MEDICINE

## 2020-01-01 PROCEDURE — 97110 THERAPEUTIC EXERCISES: CPT | Performed by: PHYSICAL THERAPIST

## 2020-01-01 PROCEDURE — 84443 ASSAY THYROID STIM HORMONE: CPT | Performed by: HOSPITALIST

## 2020-01-01 PROCEDURE — 94799 UNLISTED PULMONARY SVC/PX: CPT

## 2020-01-01 PROCEDURE — 25010000002 VANCOMYCIN PER 500 MG: Performed by: NURSE PRACTITIONER

## 2020-01-01 PROCEDURE — 87205 SMEAR GRAM STAIN: CPT | Performed by: ORTHOPAEDIC SURGERY

## 2020-01-01 PROCEDURE — 87205 SMEAR GRAM STAIN: CPT | Performed by: NURSE PRACTITIONER

## 2020-01-01 PROCEDURE — 97530 THERAPEUTIC ACTIVITIES: CPT

## 2020-01-01 PROCEDURE — 25010000002 LEVOFLOXACIN PER 250 MG: Performed by: ORTHOPAEDIC SURGERY

## 2020-01-01 PROCEDURE — 86870 RBC ANTIBODY IDENTIFICATION: CPT | Performed by: ORTHOPAEDIC SURGERY

## 2020-01-01 PROCEDURE — 81003 URINALYSIS AUTO W/O SCOPE: CPT | Performed by: NURSE PRACTITIONER

## 2020-01-01 PROCEDURE — 97535 SELF CARE MNGMENT TRAINING: CPT

## 2020-01-01 PROCEDURE — 25010000002 SUCCINYLCHOLINE PER 20 MG: Performed by: NURSE ANESTHETIST, CERTIFIED REGISTERED

## 2020-01-01 PROCEDURE — 85025 COMPLETE CBC W/AUTO DIFF WBC: CPT | Performed by: EMERGENCY MEDICINE

## 2020-01-01 PROCEDURE — 25010000002 FENTANYL CITRATE (PF) 100 MCG/2ML SOLUTION: Performed by: NURSE ANESTHETIST, CERTIFIED REGISTERED

## 2020-01-01 PROCEDURE — 80053 COMPREHEN METABOLIC PANEL: CPT | Performed by: EMERGENCY MEDICINE

## 2020-01-01 PROCEDURE — 83735 ASSAY OF MAGNESIUM: CPT | Performed by: EMERGENCY MEDICINE

## 2020-01-01 PROCEDURE — 25010000002 VANCOMYCIN 10 G RECONSTITUTED SOLUTION: Performed by: SURGERY

## 2020-01-01 PROCEDURE — 85025 COMPLETE CBC W/AUTO DIFF WBC: CPT | Performed by: HOSPITALIST

## 2020-01-01 PROCEDURE — 25010000002 VANCOMYCIN 1 G RECONSTITUTED SOLUTION: Performed by: ORTHOPAEDIC SURGERY

## 2020-01-01 PROCEDURE — 85652 RBC SED RATE AUTOMATED: CPT | Performed by: EMERGENCY MEDICINE

## 2020-01-01 PROCEDURE — 80202 ASSAY OF VANCOMYCIN: CPT | Performed by: NURSE PRACTITIONER

## 2020-01-01 PROCEDURE — 86922 COMPATIBILITY TEST ANTIGLOB: CPT

## 2020-01-01 PROCEDURE — 84550 ASSAY OF BLOOD/URIC ACID: CPT | Performed by: HOSPITALIST

## 2020-01-01 PROCEDURE — 25010000002 ONDANSETRON PER 1 MG: Performed by: NURSE ANESTHETIST, CERTIFIED REGISTERED

## 2020-01-01 PROCEDURE — 84439 ASSAY OF FREE THYROXINE: CPT | Performed by: HOSPITALIST

## 2020-01-01 PROCEDURE — 25810000003 SODIUM CHLORIDE 0.9 % WITH KCL 20 MEQ 20-0.9 MEQ/L-% SOLUTION: Performed by: HOSPITALIST

## 2020-01-01 PROCEDURE — 78300 BONE IMAGING LIMITED AREA: CPT

## 2020-01-01 PROCEDURE — 25010000002 DEXAMETHASONE PER 1 MG: Performed by: NURSE ANESTHETIST, CERTIFIED REGISTERED

## 2020-01-01 PROCEDURE — 99285 EMERGENCY DEPT VISIT HI MDM: CPT

## 2020-01-01 PROCEDURE — 84484 ASSAY OF TROPONIN QUANT: CPT | Performed by: EMERGENCY MEDICINE

## 2020-01-01 PROCEDURE — 87186 SC STD MICRODIL/AGAR DIL: CPT | Performed by: NURSE PRACTITIONER

## 2020-01-01 PROCEDURE — 72110 X-RAY EXAM L-2 SPINE 4/>VWS: CPT

## 2020-01-01 PROCEDURE — 82550 ASSAY OF CK (CPK): CPT | Performed by: EMERGENCY MEDICINE

## 2020-01-01 PROCEDURE — 99232 SBSQ HOSP IP/OBS MODERATE 35: CPT | Performed by: INTERNAL MEDICINE

## 2020-01-01 PROCEDURE — 85027 COMPLETE CBC AUTOMATED: CPT | Performed by: NURSE PRACTITIONER

## 2020-01-01 PROCEDURE — 25010000002 FENTANYL CITRATE (PF) 100 MCG/2ML SOLUTION: Performed by: ANESTHESIOLOGY

## 2020-01-01 PROCEDURE — 0QPJ04Z REMOVAL OF INTERNAL FIXATION DEVICE FROM RIGHT FIBULA, OPEN APPROACH: ICD-10-PCS | Performed by: ORTHOPAEDIC SURGERY

## 2020-01-01 PROCEDURE — 36430 TRANSFUSION BLD/BLD COMPNT: CPT

## 2020-01-01 PROCEDURE — 25010000002 ONDANSETRON PER 1 MG: Performed by: ORTHOPAEDIC SURGERY

## 2020-01-01 PROCEDURE — 86905 BLOOD TYPING RBC ANTIGENS: CPT | Performed by: ORTHOPAEDIC SURGERY

## 2020-01-01 PROCEDURE — 80053 COMPREHEN METABOLIC PANEL: CPT | Performed by: HOSPITALIST

## 2020-01-01 PROCEDURE — 73721 MRI JNT OF LWR EXTRE W/O DYE: CPT

## 2020-01-01 PROCEDURE — 73630 X-RAY EXAM OF FOOT: CPT

## 2020-01-01 PROCEDURE — 99222 1ST HOSP IP/OBS MODERATE 55: CPT | Performed by: INTERNAL MEDICINE

## 2020-01-01 PROCEDURE — 93010 ELECTROCARDIOGRAM REPORT: CPT | Performed by: INTERNAL MEDICINE

## 2020-01-01 PROCEDURE — 99222 1ST HOSP IP/OBS MODERATE 55: CPT | Performed by: SURGERY

## 2020-01-01 PROCEDURE — 93005 ELECTROCARDIOGRAM TRACING: CPT | Performed by: NURSE PRACTITIONER

## 2020-01-01 PROCEDURE — 0QSG04Z REPOSITION RIGHT TIBIA WITH INTERNAL FIXATION DEVICE, OPEN APPROACH: ICD-10-PCS | Performed by: ORTHOPAEDIC SURGERY

## 2020-01-01 PROCEDURE — 25010000002 MAGNESIUM SULFATE 2 GM/50ML SOLUTION: Performed by: NURSE PRACTITIONER

## 2020-01-01 PROCEDURE — 76000 FLUOROSCOPY <1 HR PHYS/QHP: CPT

## 2020-01-01 PROCEDURE — 49587 PR REPAIR UMBILICAL HERN,5+Y/O,STRANG: CPT | Performed by: PHYSICIAN ASSISTANT

## 2020-01-01 PROCEDURE — 97162 PT EVAL MOD COMPLEX 30 MIN: CPT | Performed by: PHYSICAL THERAPIST

## 2020-01-01 PROCEDURE — 86850 RBC ANTIBODY SCREEN: CPT

## 2020-01-01 PROCEDURE — 25010000002 MAGNESIUM SULFATE 2 GM/50ML SOLUTION: Performed by: HOSPITALIST

## 2020-01-01 PROCEDURE — 25010000003 MEPIVACAINE PER 10 ML: Performed by: ANESTHESIOLOGY

## 2020-01-01 PROCEDURE — C1713 ANCHOR/SCREW BN/BN,TIS/BN: HCPCS | Performed by: ORTHOPAEDIC SURGERY

## 2020-01-01 PROCEDURE — 25010000002 ROPIVACAINE PER 1 MG: Performed by: ANESTHESIOLOGY

## 2020-01-01 PROCEDURE — 86870 RBC ANTIBODY IDENTIFICATION: CPT

## 2020-01-01 PROCEDURE — 73700 CT LOWER EXTREMITY W/O DYE: CPT

## 2020-01-01 PROCEDURE — 85025 COMPLETE CBC W/AUTO DIFF WBC: CPT | Performed by: SURGERY

## 2020-01-01 PROCEDURE — 72192 CT PELVIS W/O DYE: CPT

## 2020-01-01 PROCEDURE — 83880 ASSAY OF NATRIURETIC PEPTIDE: CPT | Performed by: HOSPITALIST

## 2020-01-01 PROCEDURE — 25010000002 ONDANSETRON PER 1 MG: Performed by: EMERGENCY MEDICINE

## 2020-01-01 PROCEDURE — 97163 PT EVAL HIGH COMPLEX 45 MIN: CPT

## 2020-01-01 PROCEDURE — 25010000002 HYDROMORPHONE PER 4 MG: Performed by: NURSE ANESTHETIST, CERTIFIED REGISTERED

## 2020-01-01 PROCEDURE — A9503 TC99M MEDRONATE: HCPCS | Performed by: HOSPITALIST

## 2020-01-01 PROCEDURE — 25010000002 MORPHINE PER 10 MG: Performed by: NURSE PRACTITIONER

## 2020-01-01 PROCEDURE — 87040 BLOOD CULTURE FOR BACTERIA: CPT | Performed by: EMERGENCY MEDICINE

## 2020-01-01 PROCEDURE — 25010000002 CEFTRIAXONE PER 250 MG: Performed by: HOSPITALIST

## 2020-01-01 PROCEDURE — 86901 BLOOD TYPING SEROLOGIC RH(D): CPT | Performed by: ORTHOPAEDIC SURGERY

## 2020-01-01 PROCEDURE — 25010000002 VANCOMYCIN 10 G RECONSTITUTED SOLUTION: Performed by: NURSE PRACTITIONER

## 2020-01-01 PROCEDURE — 25010000002 PROPOFOL 10 MG/ML EMULSION: Performed by: NURSE ANESTHETIST, CERTIFIED REGISTERED

## 2020-01-01 PROCEDURE — 0 TECHNETIUM MEDRONATE KIT: Performed by: HOSPITALIST

## 2020-01-01 PROCEDURE — 86901 BLOOD TYPING SEROLOGIC RH(D): CPT

## 2020-01-01 PROCEDURE — 87070 CULTURE OTHR SPECIMN AEROBIC: CPT | Performed by: NURSE PRACTITIONER

## 2020-01-01 PROCEDURE — 25010000002 PHENYLEPHRINE PER 1 ML: Performed by: NURSE ANESTHETIST, CERTIFIED REGISTERED

## 2020-01-01 PROCEDURE — 93005 ELECTROCARDIOGRAM TRACING: CPT | Performed by: EMERGENCY MEDICINE

## 2020-01-01 PROCEDURE — 73560 X-RAY EXAM OF KNEE 1 OR 2: CPT

## 2020-01-01 PROCEDURE — 25010000002 FENTANYL CITRATE (PF) 100 MCG/2ML SOLUTION: Performed by: STUDENT IN AN ORGANIZED HEALTH CARE EDUCATION/TRAINING PROGRAM

## 2020-01-01 PROCEDURE — 93306 TTE W/DOPPLER COMPLETE: CPT | Performed by: INTERNAL MEDICINE

## 2020-01-01 PROCEDURE — 80061 LIPID PANEL: CPT | Performed by: HOSPITALIST

## 2020-01-01 PROCEDURE — 93306 TTE W/DOPPLER COMPLETE: CPT

## 2020-01-01 PROCEDURE — 74176 CT ABD & PELVIS W/O CONTRAST: CPT

## 2020-01-01 PROCEDURE — 87040 BLOOD CULTURE FOR BACTERIA: CPT | Performed by: INTERNAL MEDICINE

## 2020-01-01 PROCEDURE — 86140 C-REACTIVE PROTEIN: CPT | Performed by: NURSE PRACTITIONER

## 2020-01-01 PROCEDURE — 25010000002 VANCOMYCIN 10 G RECONSTITUTED SOLUTION: Performed by: ORTHOPAEDIC SURGERY

## 2020-01-01 PROCEDURE — 97161 PT EVAL LOW COMPLEX 20 MIN: CPT

## 2020-01-01 PROCEDURE — 36415 COLL VENOUS BLD VENIPUNCTURE: CPT | Performed by: NURSE PRACTITIONER

## 2020-01-01 PROCEDURE — 84484 ASSAY OF TROPONIN QUANT: CPT | Performed by: NURSE PRACTITIONER

## 2020-01-01 PROCEDURE — 86900 BLOOD TYPING SEROLOGIC ABO: CPT

## 2020-01-01 PROCEDURE — 71045 X-RAY EXAM CHEST 1 VIEW: CPT

## 2020-01-01 PROCEDURE — 86920 COMPATIBILITY TEST SPIN: CPT

## 2020-01-01 PROCEDURE — 72170 X-RAY EXAM OF PELVIS: CPT

## 2020-01-01 PROCEDURE — 25010000002 ONDANSETRON PER 1 MG: Performed by: NURSE PRACTITIONER

## 2020-01-01 PROCEDURE — 83605 ASSAY OF LACTIC ACID: CPT | Performed by: NURSE PRACTITIONER

## 2020-01-01 PROCEDURE — 25010000002 MORPHINE PER 10 MG: Performed by: INTERNAL MEDICINE

## 2020-01-01 PROCEDURE — 84145 PROCALCITONIN (PCT): CPT | Performed by: EMERGENCY MEDICINE

## 2020-01-01 PROCEDURE — 83735 ASSAY OF MAGNESIUM: CPT | Performed by: SURGERY

## 2020-01-01 PROCEDURE — 73552 X-RAY EXAM OF FEMUR 2/>: CPT

## 2020-01-01 PROCEDURE — 87176 TISSUE HOMOGENIZATION CULTR: CPT | Performed by: ORTHOPAEDIC SURGERY

## 2020-01-01 PROCEDURE — 25010000002 ONDANSETRON PER 1 MG: Performed by: SURGERY

## 2020-01-01 PROCEDURE — 73502 X-RAY EXAM HIP UNI 2-3 VIEWS: CPT

## 2020-01-01 PROCEDURE — 85025 COMPLETE CBC W/AUTO DIFF WBC: CPT | Performed by: NURSE PRACTITIONER

## 2020-01-01 PROCEDURE — 71046 X-RAY EXAM CHEST 2 VIEWS: CPT

## 2020-01-01 PROCEDURE — 87077 CULTURE AEROBIC IDENTIFY: CPT | Performed by: NURSE PRACTITIONER

## 2020-01-01 PROCEDURE — 87150 DNA/RNA AMPLIFIED PROBE: CPT | Performed by: EMERGENCY MEDICINE

## 2020-01-01 PROCEDURE — 99024 POSTOP FOLLOW-UP VISIT: CPT | Performed by: SURGERY

## 2020-01-01 PROCEDURE — 87070 CULTURE OTHR SPECIMN AEROBIC: CPT | Performed by: ORTHOPAEDIC SURGERY

## 2020-01-01 PROCEDURE — 36415 COLL VENOUS BLD VENIPUNCTURE: CPT | Performed by: HOSPITALIST

## 2020-01-01 PROCEDURE — 86850 RBC ANTIBODY SCREEN: CPT | Performed by: ORTHOPAEDIC SURGERY

## 2020-01-01 PROCEDURE — 87147 CULTURE TYPE IMMUNOLOGIC: CPT | Performed by: ORTHOPAEDIC SURGERY

## 2020-01-01 PROCEDURE — 86902 BLOOD TYPE ANTIGEN DONOR EA: CPT

## 2020-01-01 PROCEDURE — 80202 ASSAY OF VANCOMYCIN: CPT | Performed by: SURGERY

## 2020-01-01 PROCEDURE — 0JXQ0ZB TRANSFER RIGHT FOOT SUBCUTANEOUS TISSUE AND FASCIA WITH SKIN AND SUBCUTANEOUS TISSUE, OPEN APPROACH: ICD-10-PCS | Performed by: ORTHOPAEDIC SURGERY

## 2020-01-01 PROCEDURE — P9612 CATHETERIZE FOR URINE SPEC: HCPCS

## 2020-01-01 PROCEDURE — 25010000002 ONDANSETRON PER 1 MG: Performed by: STUDENT IN AN ORGANIZED HEALTH CARE EDUCATION/TRAINING PROGRAM

## 2020-01-01 PROCEDURE — 80069 RENAL FUNCTION PANEL: CPT | Performed by: HOSPITALIST

## 2020-01-01 PROCEDURE — 87147 CULTURE TYPE IMMUNOLOGIC: CPT | Performed by: EMERGENCY MEDICINE

## 2020-01-01 PROCEDURE — 99284 EMERGENCY DEPT VISIT MOD MDM: CPT

## 2020-01-01 PROCEDURE — 25010000002 VANCOMYCIN 750 MG RECONSTITUTED SOLUTION: Performed by: NURSE PRACTITIONER

## 2020-01-01 PROCEDURE — 84443 ASSAY THYROID STIM HORMONE: CPT | Performed by: NURSE PRACTITIONER

## 2020-01-01 PROCEDURE — 87075 CULTR BACTERIA EXCEPT BLOOD: CPT | Performed by: ORTHOPAEDIC SURGERY

## 2020-01-01 PROCEDURE — 25010000002 VANCOMYCIN 10 G RECONSTITUTED SOLUTION: Performed by: EMERGENCY MEDICINE

## 2020-01-01 PROCEDURE — 87186 SC STD MICRODIL/AGAR DIL: CPT | Performed by: EMERGENCY MEDICINE

## 2020-01-01 PROCEDURE — 83874 ASSAY OF MYOGLOBIN: CPT | Performed by: EMERGENCY MEDICINE

## 2020-01-01 PROCEDURE — 80202 ASSAY OF VANCOMYCIN: CPT | Performed by: ORTHOPAEDIC SURGERY

## 2020-01-01 PROCEDURE — 97166 OT EVAL MOD COMPLEX 45 MIN: CPT

## 2020-01-01 PROCEDURE — 25010000002 ENOXAPARIN PER 10 MG: Performed by: NURSE PRACTITIONER

## 2020-01-01 PROCEDURE — 0WQF0ZZ REPAIR ABDOMINAL WALL, OPEN APPROACH: ICD-10-PCS | Performed by: SURGERY

## 2020-01-01 PROCEDURE — P9016 RBC LEUKOCYTES REDUCED: HCPCS

## 2020-01-01 PROCEDURE — 86140 C-REACTIVE PROTEIN: CPT | Performed by: EMERGENCY MEDICINE

## 2020-01-01 PROCEDURE — 87040 BLOOD CULTURE FOR BACTERIA: CPT | Performed by: HOSPITALIST

## 2020-01-01 PROCEDURE — 86900 BLOOD TYPING SEROLOGIC ABO: CPT | Performed by: ORTHOPAEDIC SURGERY

## 2020-01-01 PROCEDURE — 81003 URINALYSIS AUTO W/O SCOPE: CPT | Performed by: EMERGENCY MEDICINE

## 2020-01-01 DEVICE — PLATE, LATERAL FIBULA, SML, RIGHT
Type: IMPLANTABLE DEVICE | Site: ANKLE | Status: FUNCTIONAL
Brand: MEDLINE UNITE

## 2020-01-01 DEVICE — SCREW, LOCKING, 3.5 X 16MM
Type: IMPLANTABLE DEVICE | Site: ANKLE | Status: FUNCTIONAL
Brand: MEDLINE UNITE

## 2020-01-01 DEVICE — ARISTA AH ABSORBABLE HEMOSTATIC PARTICLES
Type: IMPLANTABLE DEVICE | Status: FUNCTIONAL
Brand: ARISTA™ AH

## 2020-01-01 DEVICE — SCREW, LOCKING, 3.5 X 14MM
Type: IMPLANTABLE DEVICE | Site: ANKLE | Status: FUNCTIONAL
Brand: MEDLINE UNITE

## 2020-01-01 DEVICE — SCREW, LOCKING, 3.5 X 12MM
Type: IMPLANTABLE DEVICE | Site: ANKLE | Status: FUNCTIONAL
Brand: MEDLINE UNITE

## 2020-01-01 DEVICE — SCREW, NON-LOCKING, 3.5 X 46MM
Type: IMPLANTABLE DEVICE | Site: ANKLE | Status: FUNCTIONAL
Brand: MEDLINE UNITE

## 2020-01-01 DEVICE — SCREW, NON-LOCKING, 3.5 X 14MM
Type: IMPLANTABLE DEVICE | Site: ANKLE | Status: FUNCTIONAL
Brand: MEDLINE UNITE

## 2020-01-01 DEVICE — SCREW, HEADED, 4.0 X 44MM
Type: IMPLANTABLE DEVICE | Site: ANKLE | Status: FUNCTIONAL
Brand: MEDLINE UNITE

## 2020-01-01 RX ORDER — HALOPERIDOL 2 MG/ML
1 SOLUTION ORAL EVERY 4 HOURS PRN
Status: DISCONTINUED | OUTPATIENT
Start: 2020-01-01 | End: 2020-01-01 | Stop reason: HOSPADM

## 2020-01-01 RX ORDER — ONDANSETRON 2 MG/ML
4 INJECTION INTRAMUSCULAR; INTRAVENOUS ONCE
Status: COMPLETED | OUTPATIENT
Start: 2020-01-01 | End: 2020-01-01

## 2020-01-01 RX ORDER — MORPHINE SULFATE 2 MG/ML
2 INJECTION, SOLUTION INTRAMUSCULAR; INTRAVENOUS
Status: DISCONTINUED | OUTPATIENT
Start: 2020-01-01 | End: 2020-01-01

## 2020-01-01 RX ORDER — SODIUM CHLORIDE 0.9 % (FLUSH) 0.9 %
3-10 SYRINGE (ML) INJECTION AS NEEDED
Status: DISCONTINUED | OUTPATIENT
Start: 2020-01-01 | End: 2020-01-01 | Stop reason: HOSPADM

## 2020-01-01 RX ORDER — BISACODYL 10 MG
10 SUPPOSITORY, RECTAL RECTAL ONCE
Status: COMPLETED | OUTPATIENT
Start: 2020-01-01 | End: 2020-01-01

## 2020-01-01 RX ORDER — MORPHINE SULFATE 2 MG/ML
2 INJECTION, SOLUTION INTRAMUSCULAR; INTRAVENOUS EVERY 4 HOURS PRN
Status: DISCONTINUED | OUTPATIENT
Start: 2020-01-01 | End: 2020-01-01

## 2020-01-01 RX ORDER — MORPHINE SULFATE 10 MG/ML
6 INJECTION INTRAMUSCULAR; INTRAVENOUS; SUBCUTANEOUS
Status: DISCONTINUED | OUTPATIENT
Start: 2020-01-01 | End: 2020-01-01 | Stop reason: HOSPADM

## 2020-01-01 RX ORDER — FENTANYL CITRATE 50 UG/ML
50 INJECTION, SOLUTION INTRAMUSCULAR; INTRAVENOUS
Status: DISCONTINUED | OUTPATIENT
Start: 2020-01-01 | End: 2020-01-01

## 2020-01-01 RX ORDER — MORPHINE SULFATE 20 MG/ML
10 SOLUTION ORAL
Status: CANCELLED | OUTPATIENT
Start: 2020-01-01 | End: 2020-05-09

## 2020-01-01 RX ORDER — ATROPINE SULFATE 10 MG/ML
2 SOLUTION/ DROPS OPHTHALMIC 2 TIMES DAILY PRN
Status: DISCONTINUED | OUTPATIENT
Start: 2020-01-01 | End: 2020-01-01 | Stop reason: HOSPADM

## 2020-01-01 RX ORDER — BUPIVACAINE HYDROCHLORIDE AND EPINEPHRINE 5; 5 MG/ML; UG/ML
INJECTION, SOLUTION PERINEURAL AS NEEDED
Status: DISCONTINUED | OUTPATIENT
Start: 2020-01-01 | End: 2020-01-01 | Stop reason: HOSPADM

## 2020-01-01 RX ORDER — LORAZEPAM 2 MG/ML
1 INJECTION INTRAMUSCULAR
Status: DISCONTINUED | OUTPATIENT
Start: 2020-01-01 | End: 2020-01-01 | Stop reason: HOSPADM

## 2020-01-01 RX ORDER — DIPHENOXYLATE HYDROCHLORIDE AND ATROPINE SULFATE 2.5; .025 MG/1; MG/1
1 TABLET ORAL
Status: DISCONTINUED | OUTPATIENT
Start: 2020-01-01 | End: 2020-01-01 | Stop reason: HOSPADM

## 2020-01-01 RX ORDER — LIDOCAINE HYDROCHLORIDE 10 MG/ML
0.5 INJECTION, SOLUTION EPIDURAL; INFILTRATION; INTRACAUDAL; PERINEURAL ONCE AS NEEDED
Status: DISCONTINUED | OUTPATIENT
Start: 2020-01-01 | End: 2020-01-01 | Stop reason: HOSPADM

## 2020-01-01 RX ORDER — ACETAMINOPHEN 650 MG/1
650 SUPPOSITORY RECTAL EVERY 4 HOURS PRN
Status: DISCONTINUED | OUTPATIENT
Start: 2020-01-01 | End: 2020-01-01 | Stop reason: HOSPADM

## 2020-01-01 RX ORDER — PANTOPRAZOLE SODIUM 40 MG/1
40 TABLET, DELAYED RELEASE ORAL DAILY
Qty: 30 TABLET | Refills: 0 | Status: SHIPPED | OUTPATIENT
Start: 2020-01-01 | End: 2020-05-09

## 2020-01-01 RX ORDER — LORAZEPAM 2 MG/ML
1 CONCENTRATE ORAL
Status: CANCELLED | OUTPATIENT
Start: 2020-01-01 | End: 2020-05-09

## 2020-01-01 RX ORDER — LORAZEPAM 2 MG/ML
1 CONCENTRATE ORAL
Status: DISCONTINUED | OUTPATIENT
Start: 2020-01-01 | End: 2020-01-01 | Stop reason: HOSPADM

## 2020-01-01 RX ORDER — HYDRALAZINE HYDROCHLORIDE 20 MG/ML
5 INJECTION INTRAMUSCULAR; INTRAVENOUS
Status: DISCONTINUED | OUTPATIENT
Start: 2020-01-01 | End: 2020-01-01 | Stop reason: HOSPADM

## 2020-01-01 RX ORDER — HYDROMORPHONE HYDROCHLORIDE 1 MG/ML
0.5 INJECTION, SOLUTION INTRAMUSCULAR; INTRAVENOUS; SUBCUTANEOUS
Status: DISCONTINUED | OUTPATIENT
Start: 2020-01-01 | End: 2020-01-01

## 2020-01-01 RX ORDER — MORPHINE SULFATE 2 MG/ML
2 INJECTION, SOLUTION INTRAMUSCULAR; INTRAVENOUS
Status: CANCELLED | OUTPATIENT
Start: 2020-01-01 | End: 2020-05-09

## 2020-01-01 RX ORDER — HYDROMORPHONE HCL 110MG/55ML
1.5 PATIENT CONTROLLED ANALGESIA SYRINGE INTRAVENOUS
Status: DISCONTINUED | OUTPATIENT
Start: 2020-01-01 | End: 2020-01-01 | Stop reason: HOSPADM

## 2020-01-01 RX ORDER — LISINOPRIL 40 MG/1
40 TABLET ORAL ONCE
Status: DISCONTINUED | OUTPATIENT
Start: 2020-01-01 | End: 2020-01-01

## 2020-01-01 RX ORDER — GABAPENTIN 400 MG/1
800 CAPSULE ORAL ONCE
Status: DISCONTINUED | OUTPATIENT
Start: 2020-01-01 | End: 2020-01-01

## 2020-01-01 RX ORDER — ATROPINE SULFATE 10 MG/ML
2 SOLUTION/ DROPS OPHTHALMIC 2 TIMES DAILY PRN
Status: CANCELLED | OUTPATIENT
Start: 2020-01-01

## 2020-01-01 RX ORDER — SUCCINYLCHOLINE CHLORIDE 20 MG/ML
INJECTION INTRAMUSCULAR; INTRAVENOUS AS NEEDED
Status: DISCONTINUED | OUTPATIENT
Start: 2020-01-01 | End: 2020-01-01 | Stop reason: SURG

## 2020-01-01 RX ORDER — POTASSIUM CHLORIDE 750 MG/1
40 CAPSULE, EXTENDED RELEASE ORAL AS NEEDED
Status: DISCONTINUED | OUTPATIENT
Start: 2020-01-01 | End: 2020-01-01

## 2020-01-01 RX ORDER — MORPHINE SULFATE 2 MG/ML
2 INJECTION, SOLUTION INTRAMUSCULAR; INTRAVENOUS ONCE
Status: COMPLETED | OUTPATIENT
Start: 2020-01-01 | End: 2020-01-01

## 2020-01-01 RX ORDER — DIPHENHYDRAMINE HYDROCHLORIDE 50 MG/ML
12.5 INJECTION INTRAMUSCULAR; INTRAVENOUS
Status: DISCONTINUED | OUTPATIENT
Start: 2020-01-01 | End: 2020-01-01

## 2020-01-01 RX ORDER — METOPROLOL SUCCINATE 25 MG/1
12.5 TABLET, EXTENDED RELEASE ORAL DAILY
Qty: 15 TABLET | Refills: 0 | Status: SHIPPED | OUTPATIENT
Start: 2020-01-01 | End: 2020-05-10

## 2020-01-01 RX ORDER — METOPROLOL SUCCINATE 25 MG/1
25 TABLET, EXTENDED RELEASE ORAL ONCE
Status: DISCONTINUED | OUTPATIENT
Start: 2020-01-01 | End: 2020-01-01

## 2020-01-01 RX ORDER — MORPHINE SULFATE 2 MG/ML
4 INJECTION, SOLUTION INTRAMUSCULAR; INTRAVENOUS ONCE
Status: COMPLETED | OUTPATIENT
Start: 2020-01-01 | End: 2020-01-01

## 2020-01-01 RX ORDER — LIDOCAINE HYDROCHLORIDE 20 MG/ML
INJECTION, SOLUTION INFILTRATION; PERINEURAL AS NEEDED
Status: DISCONTINUED | OUTPATIENT
Start: 2020-01-01 | End: 2020-01-01 | Stop reason: SURG

## 2020-01-01 RX ORDER — MORPHINE SULFATE 2 MG/ML
2 INJECTION, SOLUTION INTRAMUSCULAR; INTRAVENOUS ONCE
Status: DISCONTINUED | OUTPATIENT
Start: 2020-01-01 | End: 2020-01-01

## 2020-01-01 RX ORDER — LORAZEPAM 2 MG/ML
2 CONCENTRATE ORAL
Status: CANCELLED | OUTPATIENT
Start: 2020-01-01 | End: 2020-05-09

## 2020-01-01 RX ORDER — HYDROMORPHONE HYDROCHLORIDE 1 MG/ML
0.5 INJECTION, SOLUTION INTRAMUSCULAR; INTRAVENOUS; SUBCUTANEOUS
Status: DISCONTINUED | OUTPATIENT
Start: 2020-01-01 | End: 2020-01-01 | Stop reason: HOSPADM

## 2020-01-01 RX ORDER — HYDROCODONE BITARTRATE AND ACETAMINOPHEN 5; 325 MG/1; MG/1
1 TABLET ORAL ONCE
Status: COMPLETED | OUTPATIENT
Start: 2020-01-01 | End: 2020-01-01

## 2020-01-01 RX ORDER — GABAPENTIN 800 MG/1
800 TABLET ORAL 3 TIMES DAILY
COMMUNITY
End: 2020-01-01 | Stop reason: HOSPADM

## 2020-01-01 RX ORDER — LORAZEPAM 2 MG/ML
0.5 CONCENTRATE ORAL
Status: DISCONTINUED | OUTPATIENT
Start: 2020-01-01 | End: 2020-01-01 | Stop reason: HOSPADM

## 2020-01-01 RX ORDER — PROMETHAZINE HYDROCHLORIDE 25 MG/ML
12.5 INJECTION, SOLUTION INTRAMUSCULAR; INTRAVENOUS ONCE AS NEEDED
Status: DISCONTINUED | OUTPATIENT
Start: 2020-01-01 | End: 2020-01-01

## 2020-01-01 RX ORDER — METOPROLOL SUCCINATE 25 MG/1
12.5 TABLET, EXTENDED RELEASE ORAL DAILY
Status: DISCONTINUED | OUTPATIENT
Start: 2020-01-01 | End: 2020-01-01 | Stop reason: HOSPADM

## 2020-01-01 RX ORDER — SODIUM CHLORIDE 0.9 % (FLUSH) 0.9 %
10 SYRINGE (ML) INJECTION AS NEEDED
Status: DISCONTINUED | OUTPATIENT
Start: 2020-01-01 | End: 2020-01-01

## 2020-01-01 RX ORDER — FAMOTIDINE 10 MG/ML
20 INJECTION, SOLUTION INTRAVENOUS ONCE
Status: COMPLETED | OUTPATIENT
Start: 2020-01-01 | End: 2020-01-01

## 2020-01-01 RX ORDER — LORAZEPAM 2 MG/ML
2 CONCENTRATE ORAL
Status: DISCONTINUED | OUTPATIENT
Start: 2020-01-01 | End: 2020-01-01 | Stop reason: HOSPADM

## 2020-01-01 RX ORDER — FLUMAZENIL 0.1 MG/ML
0.2 INJECTION INTRAVENOUS AS NEEDED
Status: DISCONTINUED | OUTPATIENT
Start: 2020-01-01 | End: 2020-01-01 | Stop reason: HOSPADM

## 2020-01-01 RX ORDER — MAGNESIUM SULFATE HEPTAHYDRATE 40 MG/ML
2 INJECTION, SOLUTION INTRAVENOUS AS NEEDED
Status: DISCONTINUED | OUTPATIENT
Start: 2020-01-01 | End: 2020-01-01

## 2020-01-01 RX ORDER — HYDROCODONE BITARTRATE AND ACETAMINOPHEN 5; 325 MG/1; MG/1
1 TABLET ORAL EVERY 6 HOURS PRN
Status: DISCONTINUED | OUTPATIENT
Start: 2020-01-01 | End: 2020-01-01

## 2020-01-01 RX ORDER — ASPIRIN 325 MG
325 TABLET, DELAYED RELEASE (ENTERIC COATED) ORAL DAILY
Status: DISCONTINUED | OUTPATIENT
Start: 2020-01-01 | End: 2020-01-01 | Stop reason: HOSPADM

## 2020-01-01 RX ORDER — GABAPENTIN 800 MG/1
800 TABLET ORAL 3 TIMES DAILY
COMMUNITY

## 2020-01-01 RX ORDER — LEVOTHYROXINE SODIUM 112 UG/1
112 TABLET ORAL ONCE
Status: COMPLETED | OUTPATIENT
Start: 2020-01-01 | End: 2020-01-01

## 2020-01-01 RX ORDER — PROPOFOL 10 MG/ML
VIAL (ML) INTRAVENOUS AS NEEDED
Status: DISCONTINUED | OUTPATIENT
Start: 2020-01-01 | End: 2020-01-01 | Stop reason: SURG

## 2020-01-01 RX ORDER — ASPIRIN 81 MG/1
81 TABLET, CHEWABLE ORAL DAILY
Status: DISCONTINUED | OUTPATIENT
Start: 2020-01-01 | End: 2020-01-01

## 2020-01-01 RX ORDER — ACETAMINOPHEN 160 MG/5ML
650 SOLUTION ORAL EVERY 4 HOURS PRN
Status: DISCONTINUED | OUTPATIENT
Start: 2020-01-01 | End: 2020-01-01 | Stop reason: HOSPADM

## 2020-01-01 RX ORDER — METOPROLOL SUCCINATE 25 MG/1
25 TABLET, EXTENDED RELEASE ORAL DAILY
Status: DISCONTINUED | OUTPATIENT
Start: 2020-01-01 | End: 2020-01-01 | Stop reason: HOSPADM

## 2020-01-01 RX ORDER — HALOPERIDOL 1 MG/1
1 TABLET ORAL EVERY 4 HOURS PRN
Status: DISCONTINUED | OUTPATIENT
Start: 2020-01-01 | End: 2020-01-01 | Stop reason: HOSPADM

## 2020-01-01 RX ORDER — LABETALOL HYDROCHLORIDE 5 MG/ML
5 INJECTION, SOLUTION INTRAVENOUS
Status: DISCONTINUED | OUTPATIENT
Start: 2020-01-01 | End: 2020-01-01 | Stop reason: HOSPADM

## 2020-01-01 RX ORDER — MORPHINE SULFATE 2 MG/ML
6 INJECTION, SOLUTION INTRAMUSCULAR; INTRAVENOUS
Status: DISCONTINUED | OUTPATIENT
Start: 2020-01-01 | End: 2020-01-01 | Stop reason: HOSPADM

## 2020-01-01 RX ORDER — DOCUSATE SODIUM 100 MG/1
100 CAPSULE, LIQUID FILLED ORAL 2 TIMES DAILY
Status: DISCONTINUED | OUTPATIENT
Start: 2020-01-01 | End: 2020-01-01 | Stop reason: HOSPADM

## 2020-01-01 RX ORDER — VANCOMYCIN HYDROCHLORIDE 1 G/20ML
INJECTION, POWDER, LYOPHILIZED, FOR SOLUTION INTRAVENOUS AS NEEDED
Status: DISCONTINUED | OUTPATIENT
Start: 2020-01-01 | End: 2020-01-01 | Stop reason: HOSPADM

## 2020-01-01 RX ORDER — MAGNESIUM SULFATE HEPTAHYDRATE 40 MG/ML
2 INJECTION, SOLUTION INTRAVENOUS ONCE
Status: COMPLETED | OUTPATIENT
Start: 2020-01-01 | End: 2020-01-01

## 2020-01-01 RX ORDER — EPHEDRINE SULFATE 50 MG/ML
5 INJECTION, SOLUTION INTRAVENOUS ONCE AS NEEDED
Status: DISCONTINUED | OUTPATIENT
Start: 2020-01-01 | End: 2020-01-01

## 2020-01-01 RX ORDER — LORAZEPAM 2 MG/ML
0.5 INJECTION INTRAMUSCULAR ONCE
Status: DISCONTINUED | OUTPATIENT
Start: 2020-01-01 | End: 2020-01-01

## 2020-01-01 RX ORDER — ONDANSETRON 2 MG/ML
4 INJECTION INTRAMUSCULAR; INTRAVENOUS EVERY 6 HOURS PRN
Status: DISCONTINUED | OUTPATIENT
Start: 2020-01-01 | End: 2020-01-01

## 2020-01-01 RX ORDER — MAGNESIUM HYDROXIDE 1200 MG/15ML
LIQUID ORAL AS NEEDED
Status: DISCONTINUED | OUTPATIENT
Start: 2020-01-01 | End: 2020-01-01 | Stop reason: HOSPADM

## 2020-01-01 RX ORDER — DIPHENHYDRAMINE HYDROCHLORIDE 50 MG/ML
12.5 INJECTION INTRAMUSCULAR; INTRAVENOUS
Status: DISCONTINUED | OUTPATIENT
Start: 2020-01-01 | End: 2020-01-01 | Stop reason: HOSPADM

## 2020-01-01 RX ORDER — LABETALOL HYDROCHLORIDE 5 MG/ML
5 INJECTION, SOLUTION INTRAVENOUS
Status: DISCONTINUED | OUTPATIENT
Start: 2020-01-01 | End: 2020-01-01

## 2020-01-01 RX ORDER — ACETAMINOPHEN 650 MG/1
650 SUPPOSITORY RECTAL EVERY 4 HOURS PRN
Status: DISCONTINUED | OUTPATIENT
Start: 2020-01-01 | End: 2020-01-01

## 2020-01-01 RX ORDER — MORPHINE SULFATE 20 MG/ML
20 SOLUTION ORAL
Status: DISCONTINUED | OUTPATIENT
Start: 2020-01-01 | End: 2020-01-01 | Stop reason: HOSPADM

## 2020-01-01 RX ORDER — HALOPERIDOL 2 MG/ML
1 SOLUTION ORAL EVERY 4 HOURS PRN
Status: CANCELLED | OUTPATIENT
Start: 2020-01-01

## 2020-01-01 RX ORDER — PROMETHAZINE HYDROCHLORIDE 25 MG/1
25 SUPPOSITORY RECTAL ONCE AS NEEDED
Status: DISCONTINUED | OUTPATIENT
Start: 2020-01-01 | End: 2020-01-01 | Stop reason: HOSPADM

## 2020-01-01 RX ORDER — SODIUM CHLORIDE 0.9 % (FLUSH) 0.9 %
10 SYRINGE (ML) INJECTION AS NEEDED
Status: DISCONTINUED | OUTPATIENT
Start: 2020-01-01 | End: 2020-01-01 | Stop reason: HOSPADM

## 2020-01-01 RX ORDER — MORPHINE SULFATE 20 MG/ML
5 SOLUTION ORAL
Status: DISCONTINUED | OUTPATIENT
Start: 2020-01-01 | End: 2020-01-01 | Stop reason: HOSPADM

## 2020-01-01 RX ORDER — DIPHENHYDRAMINE HCL 25 MG
25 CAPSULE ORAL
Status: DISCONTINUED | OUTPATIENT
Start: 2020-01-01 | End: 2020-01-01 | Stop reason: HOSPADM

## 2020-01-01 RX ORDER — CEFTRIAXONE SODIUM 1 G/50ML
1 INJECTION, SOLUTION INTRAVENOUS EVERY 24 HOURS
Status: DISCONTINUED | OUTPATIENT
Start: 2020-01-01 | End: 2020-01-01

## 2020-01-01 RX ORDER — LEVOFLOXACIN 500 MG/1
500 TABLET, FILM COATED ORAL EVERY 24 HOURS
Status: DISCONTINUED | OUTPATIENT
Start: 2020-01-01 | End: 2020-01-01

## 2020-01-01 RX ORDER — LORAZEPAM 2 MG/ML
0.5 INJECTION INTRAMUSCULAR
Status: CANCELLED | OUTPATIENT
Start: 2020-01-01 | End: 2020-05-09

## 2020-01-01 RX ORDER — MORPHINE SULFATE 2 MG/ML
2 INJECTION, SOLUTION INTRAMUSCULAR; INTRAVENOUS
Status: DISCONTINUED | OUTPATIENT
Start: 2020-01-01 | End: 2020-01-01 | Stop reason: HOSPADM

## 2020-01-01 RX ORDER — CLINDAMYCIN PHOSPHATE 600 MG/50ML
600 INJECTION INTRAVENOUS EVERY 8 HOURS
Status: COMPLETED | OUTPATIENT
Start: 2020-01-01 | End: 2020-01-01

## 2020-01-01 RX ORDER — ONDANSETRON 2 MG/ML
INJECTION INTRAMUSCULAR; INTRAVENOUS AS NEEDED
Status: DISCONTINUED | OUTPATIENT
Start: 2020-01-01 | End: 2020-01-01 | Stop reason: SURG

## 2020-01-01 RX ORDER — DIPHENOXYLATE HYDROCHLORIDE AND ATROPINE SULFATE 2.5; .025 MG/1; MG/1
1 TABLET ORAL
Status: CANCELLED | OUTPATIENT
Start: 2020-01-01

## 2020-01-01 RX ORDER — HYDROCODONE BITARTRATE AND ACETAMINOPHEN 10; 325 MG/1; MG/1
1 TABLET ORAL EVERY 4 HOURS PRN
Status: DISCONTINUED | OUTPATIENT
Start: 2020-01-01 | End: 2020-01-01

## 2020-01-01 RX ORDER — METOPROLOL SUCCINATE 25 MG/1
25 TABLET, EXTENDED RELEASE ORAL DAILY
Status: DISCONTINUED | OUTPATIENT
Start: 2020-01-01 | End: 2020-01-01

## 2020-01-01 RX ORDER — CLINDAMYCIN PHOSPHATE 600 MG/50ML
600 INJECTION INTRAVENOUS ONCE
Status: COMPLETED | OUTPATIENT
Start: 2020-01-01 | End: 2020-01-01

## 2020-01-01 RX ORDER — LISINOPRIL 40 MG/1
40 TABLET ORAL ONCE
Status: COMPLETED | OUTPATIENT
Start: 2020-01-01 | End: 2020-01-01

## 2020-01-01 RX ORDER — FENTANYL CITRATE 50 UG/ML
50 INJECTION, SOLUTION INTRAMUSCULAR; INTRAVENOUS
Status: DISCONTINUED | OUTPATIENT
Start: 2020-01-01 | End: 2020-01-01 | Stop reason: HOSPADM

## 2020-01-01 RX ORDER — VANCOMYCIN HYDROCHLORIDE 1 G/200ML
1000 INJECTION, SOLUTION INTRAVENOUS EVERY 24 HOURS
Status: DISCONTINUED | OUTPATIENT
Start: 2020-01-01 | End: 2020-01-01

## 2020-01-01 RX ORDER — DIPHENHYDRAMINE HCL 25 MG
25 CAPSULE ORAL
Status: DISCONTINUED | OUTPATIENT
Start: 2020-01-01 | End: 2020-01-01

## 2020-01-01 RX ORDER — DIGOXIN 0.25 MG/ML
250 INJECTION INTRAMUSCULAR; INTRAVENOUS ONCE
Status: COMPLETED | OUTPATIENT
Start: 2020-01-01 | End: 2020-01-01

## 2020-01-01 RX ORDER — NALOXONE HCL 0.4 MG/ML
0.2 VIAL (ML) INJECTION AS NEEDED
Status: DISCONTINUED | OUTPATIENT
Start: 2020-01-01 | End: 2020-01-01 | Stop reason: HOSPADM

## 2020-01-01 RX ORDER — LORAZEPAM 2 MG/ML
1 INJECTION INTRAMUSCULAR
Status: CANCELLED | OUTPATIENT
Start: 2020-01-01 | End: 2020-05-09

## 2020-01-01 RX ORDER — ROCURONIUM BROMIDE 10 MG/ML
INJECTION, SOLUTION INTRAVENOUS AS NEEDED
Status: DISCONTINUED | OUTPATIENT
Start: 2020-01-01 | End: 2020-01-01 | Stop reason: SURG

## 2020-01-01 RX ORDER — DIGOXIN 0.25 MG/ML
125 INJECTION INTRAMUSCULAR; INTRAVENOUS EVERY 8 HOURS
Status: DISCONTINUED | OUTPATIENT
Start: 2020-01-01 | End: 2020-01-01

## 2020-01-01 RX ORDER — PROMETHAZINE HYDROCHLORIDE 25 MG/ML
6.25 INJECTION, SOLUTION INTRAMUSCULAR; INTRAVENOUS
Status: DISCONTINUED | OUTPATIENT
Start: 2020-01-01 | End: 2020-01-01 | Stop reason: HOSPADM

## 2020-01-01 RX ORDER — MIDAZOLAM HYDROCHLORIDE 1 MG/ML
2 INJECTION INTRAMUSCULAR; INTRAVENOUS
Status: DISCONTINUED | OUTPATIENT
Start: 2020-01-01 | End: 2020-01-01 | Stop reason: HOSPADM

## 2020-01-01 RX ORDER — MORPHINE SULFATE 10 MG/ML
6 INJECTION INTRAMUSCULAR; INTRAVENOUS; SUBCUTANEOUS
Status: CANCELLED | OUTPATIENT
Start: 2020-01-01 | End: 2020-05-09

## 2020-01-01 RX ORDER — HALOPERIDOL 5 MG/ML
1 INJECTION INTRAMUSCULAR EVERY 4 HOURS PRN
Status: CANCELLED | OUTPATIENT
Start: 2020-01-01

## 2020-01-01 RX ORDER — SCOLOPAMINE TRANSDERMAL SYSTEM 1 MG/1
1 PATCH, EXTENDED RELEASE TRANSDERMAL
Status: CANCELLED | OUTPATIENT
Start: 2020-01-01

## 2020-01-01 RX ORDER — SODIUM CHLORIDE 0.9 % (FLUSH) 0.9 %
3 SYRINGE (ML) INJECTION EVERY 12 HOURS SCHEDULED
Status: DISCONTINUED | OUTPATIENT
Start: 2020-01-01 | End: 2020-01-01 | Stop reason: HOSPADM

## 2020-01-01 RX ORDER — HYDROCODONE BITARTRATE AND ACETAMINOPHEN 7.5; 325 MG/1; MG/1
1 TABLET ORAL ONCE AS NEEDED
Status: DISCONTINUED | OUTPATIENT
Start: 2020-01-01 | End: 2020-01-01 | Stop reason: HOSPADM

## 2020-01-01 RX ORDER — LEVOTHYROXINE SODIUM 112 UG/1
112 TABLET ORAL
Status: DISCONTINUED | OUTPATIENT
Start: 2020-01-01 | End: 2020-01-01 | Stop reason: HOSPADM

## 2020-01-01 RX ORDER — PROMETHAZINE HYDROCHLORIDE 25 MG/1
25 SUPPOSITORY RECTAL ONCE AS NEEDED
Status: DISCONTINUED | OUTPATIENT
Start: 2020-01-01 | End: 2020-01-01

## 2020-01-01 RX ORDER — ACETAMINOPHEN 325 MG/1
650 TABLET ORAL EVERY 4 HOURS PRN
Status: DISCONTINUED | OUTPATIENT
Start: 2020-01-01 | End: 2020-01-01 | Stop reason: HOSPADM

## 2020-01-01 RX ORDER — ACETAMINOPHEN 325 MG/1
650 TABLET ORAL EVERY 4 HOURS PRN
Status: CANCELLED | OUTPATIENT
Start: 2020-01-01

## 2020-01-01 RX ORDER — DEXAMETHASONE SODIUM PHOSPHATE 10 MG/ML
INJECTION INTRAMUSCULAR; INTRAVENOUS AS NEEDED
Status: DISCONTINUED | OUTPATIENT
Start: 2020-01-01 | End: 2020-01-01 | Stop reason: SURG

## 2020-01-01 RX ORDER — FLUMAZENIL 0.1 MG/ML
0.2 INJECTION INTRAVENOUS AS NEEDED
Status: DISCONTINUED | OUTPATIENT
Start: 2020-01-01 | End: 2020-01-01

## 2020-01-01 RX ORDER — HALOPERIDOL 5 MG/ML
1 INJECTION INTRAMUSCULAR EVERY 4 HOURS PRN
Status: DISCONTINUED | OUTPATIENT
Start: 2020-01-01 | End: 2020-01-01 | Stop reason: HOSPADM

## 2020-01-01 RX ORDER — METOPROLOL SUCCINATE 25 MG/1
12.5 TABLET, EXTENDED RELEASE ORAL DAILY
Status: DISCONTINUED | OUTPATIENT
Start: 2020-01-01 | End: 2020-01-01

## 2020-01-01 RX ORDER — LEVOFLOXACIN 5 MG/ML
500 INJECTION, SOLUTION INTRAVENOUS EVERY 24 HOURS
Status: DISCONTINUED | OUTPATIENT
Start: 2020-01-01 | End: 2020-01-01

## 2020-01-01 RX ORDER — LEVOTHYROXINE SODIUM 0.12 MG/1
125 TABLET ORAL
Status: DISCONTINUED | OUTPATIENT
Start: 2020-01-01 | End: 2020-01-01 | Stop reason: HOSPADM

## 2020-01-01 RX ORDER — GABAPENTIN 100 MG/1
100 CAPSULE ORAL EVERY 8 HOURS SCHEDULED
Qty: 9 CAPSULE | Refills: 0 | Status: SHIPPED | OUTPATIENT
Start: 2020-01-01 | End: 2020-01-01

## 2020-01-01 RX ORDER — OXYCODONE AND ACETAMINOPHEN 7.5; 325 MG/1; MG/1
1 TABLET ORAL ONCE AS NEEDED
Status: DISCONTINUED | OUTPATIENT
Start: 2020-01-01 | End: 2020-01-01

## 2020-01-01 RX ORDER — LEVOTHYROXINE SODIUM 112 UG/1
112 TABLET ORAL
Status: DISCONTINUED | OUTPATIENT
Start: 2020-01-01 | End: 2020-01-01

## 2020-01-01 RX ORDER — MORPHINE SULFATE 2 MG/ML
4 INJECTION, SOLUTION INTRAMUSCULAR; INTRAVENOUS ONCE
Status: DISCONTINUED | OUTPATIENT
Start: 2020-01-01 | End: 2020-01-01

## 2020-01-01 RX ORDER — SODIUM CHLORIDE 0.9 % (FLUSH) 0.9 %
10 SYRINGE (ML) INJECTION EVERY 12 HOURS SCHEDULED
Status: DISCONTINUED | OUTPATIENT
Start: 2020-01-01 | End: 2020-01-01

## 2020-01-01 RX ORDER — GABAPENTIN 100 MG/1
100 CAPSULE ORAL EVERY 8 HOURS SCHEDULED
Status: DISCONTINUED | OUTPATIENT
Start: 2020-01-01 | End: 2020-01-01

## 2020-01-01 RX ORDER — MORPHINE SULFATE 2 MG/ML
1 INJECTION, SOLUTION INTRAMUSCULAR; INTRAVENOUS EVERY 4 HOURS PRN
Status: DISCONTINUED | OUTPATIENT
Start: 2020-01-01 | End: 2020-01-01

## 2020-01-01 RX ORDER — SODIUM CHLORIDE AND POTASSIUM CHLORIDE 150; 900 MG/100ML; MG/100ML
50 INJECTION, SOLUTION INTRAVENOUS CONTINUOUS
Status: DISCONTINUED | OUTPATIENT
Start: 2020-01-01 | End: 2020-01-01

## 2020-01-01 RX ORDER — MORPHINE SULFATE 20 MG/ML
10 SOLUTION ORAL
Status: DISCONTINUED | OUTPATIENT
Start: 2020-01-01 | End: 2020-01-01 | Stop reason: HOSPADM

## 2020-01-01 RX ORDER — TRAMADOL HYDROCHLORIDE 50 MG/1
50 TABLET ORAL EVERY 6 HOURS PRN
Status: DISCONTINUED | OUTPATIENT
Start: 2020-01-01 | End: 2020-01-01

## 2020-01-01 RX ORDER — EPHEDRINE SULFATE 50 MG/ML
5 INJECTION, SOLUTION INTRAVENOUS ONCE AS NEEDED
Status: DISCONTINUED | OUTPATIENT
Start: 2020-01-01 | End: 2020-01-01 | Stop reason: HOSPADM

## 2020-01-01 RX ORDER — GABAPENTIN 400 MG/1
400 CAPSULE ORAL EVERY 8 HOURS SCHEDULED
Status: DISCONTINUED | OUTPATIENT
Start: 2020-01-01 | End: 2020-01-01

## 2020-01-01 RX ORDER — ONDANSETRON 2 MG/ML
4 INJECTION INTRAMUSCULAR; INTRAVENOUS EVERY 6 HOURS PRN
Status: DISCONTINUED | OUTPATIENT
Start: 2020-01-01 | End: 2020-01-01 | Stop reason: HOSPADM

## 2020-01-01 RX ORDER — HYDROCODONE BITARTRATE AND ACETAMINOPHEN 5; 325 MG/1; MG/1
1 TABLET ORAL EVERY 6 HOURS PRN
Status: DISCONTINUED | OUTPATIENT
Start: 2020-01-01 | End: 2020-01-01 | Stop reason: HOSPADM

## 2020-01-01 RX ORDER — LORAZEPAM 2 MG/ML
0.5 INJECTION INTRAMUSCULAR
Status: DISCONTINUED | OUTPATIENT
Start: 2020-01-01 | End: 2020-01-01 | Stop reason: HOSPADM

## 2020-01-01 RX ORDER — SODIUM CHLORIDE, SODIUM LACTATE, POTASSIUM CHLORIDE, CALCIUM CHLORIDE 600; 310; 30; 20 MG/100ML; MG/100ML; MG/100ML; MG/100ML
9 INJECTION, SOLUTION INTRAVENOUS CONTINUOUS
Status: CANCELLED | OUTPATIENT
Start: 2020-01-01

## 2020-01-01 RX ORDER — ONDANSETRON 2 MG/ML
4 INJECTION INTRAMUSCULAR; INTRAVENOUS ONCE AS NEEDED
Status: DISCONTINUED | OUTPATIENT
Start: 2020-01-01 | End: 2020-01-01 | Stop reason: HOSPADM

## 2020-01-01 RX ORDER — MORPHINE SULFATE 4 MG/ML
4 INJECTION, SOLUTION INTRAMUSCULAR; INTRAVENOUS
Status: CANCELLED | OUTPATIENT
Start: 2020-01-01 | End: 2020-05-09

## 2020-01-01 RX ORDER — HYDROMORPHONE HCL 110MG/55ML
PATIENT CONTROLLED ANALGESIA SYRINGE INTRAVENOUS AS NEEDED
Status: DISCONTINUED | OUTPATIENT
Start: 2020-01-01 | End: 2020-01-01 | Stop reason: SURG

## 2020-01-01 RX ORDER — PSEUDOEPHEDRINE HCL 30 MG
100 TABLET ORAL 2 TIMES DAILY
Qty: 60 EACH | Refills: 0 | Status: SHIPPED | OUTPATIENT
Start: 2020-01-01 | End: 2020-05-09

## 2020-01-01 RX ORDER — SENNA AND DOCUSATE SODIUM 50; 8.6 MG/1; MG/1
2 TABLET, FILM COATED ORAL NIGHTLY
Status: DISCONTINUED | OUTPATIENT
Start: 2020-01-01 | End: 2020-01-01

## 2020-01-01 RX ORDER — ROPIVACAINE HYDROCHLORIDE 5 MG/ML
INJECTION, SOLUTION EPIDURAL; INFILTRATION; PERINEURAL
Status: COMPLETED | OUTPATIENT
Start: 2020-01-01 | End: 2020-01-01

## 2020-01-01 RX ORDER — LEVOTHYROXINE SODIUM 112 UG/1
112 TABLET ORAL ONCE
Status: DISCONTINUED | OUTPATIENT
Start: 2020-01-01 | End: 2020-01-01

## 2020-01-01 RX ORDER — PANTOPRAZOLE SODIUM 40 MG/1
40 TABLET, DELAYED RELEASE ORAL DAILY
Status: DISCONTINUED | OUTPATIENT
Start: 2020-01-01 | End: 2020-01-01

## 2020-01-01 RX ORDER — TC 99M MEDRONATE 20 MG/10ML
23 INJECTION, POWDER, LYOPHILIZED, FOR SOLUTION INTRAVENOUS
Status: COMPLETED | OUTPATIENT
Start: 2020-01-01 | End: 2020-01-01

## 2020-01-01 RX ORDER — PANTOPRAZOLE SODIUM 40 MG/1
40 TABLET, DELAYED RELEASE ORAL DAILY
Qty: 30 TABLET | Refills: 0 | Status: SHIPPED | OUTPATIENT
Start: 2020-01-01 | End: 2020-01-01

## 2020-01-01 RX ORDER — HYDROMORPHONE HCL 110MG/55ML
1.5 PATIENT CONTROLLED ANALGESIA SYRINGE INTRAVENOUS
Status: CANCELLED | OUTPATIENT
Start: 2020-01-01 | End: 2020-05-09

## 2020-01-01 RX ORDER — POTASSIUM CHLORIDE 1.5 G/1.77G
40 POWDER, FOR SOLUTION ORAL AS NEEDED
Status: DISCONTINUED | OUTPATIENT
Start: 2020-01-01 | End: 2020-01-01

## 2020-01-01 RX ORDER — PANTOPRAZOLE SODIUM 40 MG/1
40 TABLET, DELAYED RELEASE ORAL
Status: DISCONTINUED | OUTPATIENT
Start: 2020-01-01 | End: 2020-01-01 | Stop reason: HOSPADM

## 2020-01-01 RX ORDER — HYDROMORPHONE HYDROCHLORIDE 1 MG/ML
0.5 INJECTION, SOLUTION INTRAMUSCULAR; INTRAVENOUS; SUBCUTANEOUS
Status: CANCELLED | OUTPATIENT
Start: 2020-01-01 | End: 2020-05-09

## 2020-01-01 RX ORDER — PROPOFOL 10 MG/ML
VIAL (ML) INTRAVENOUS CONTINUOUS PRN
Status: DISCONTINUED | OUTPATIENT
Start: 2020-01-01 | End: 2020-01-01 | Stop reason: SURG

## 2020-01-01 RX ORDER — MAGNESIUM SULFATE HEPTAHYDRATE 40 MG/ML
4 INJECTION, SOLUTION INTRAVENOUS AS NEEDED
Status: DISCONTINUED | OUTPATIENT
Start: 2020-01-01 | End: 2020-01-01

## 2020-01-01 RX ORDER — GABAPENTIN 100 MG/1
100 CAPSULE ORAL EVERY 8 HOURS SCHEDULED
Status: DISCONTINUED | OUTPATIENT
Start: 2020-01-01 | End: 2020-01-01 | Stop reason: HOSPADM

## 2020-01-01 RX ORDER — LEVOTHYROXINE SODIUM 112 UG/1
112 TABLET ORAL
COMMUNITY

## 2020-01-01 RX ORDER — HYDRALAZINE HYDROCHLORIDE 20 MG/ML
5 INJECTION INTRAMUSCULAR; INTRAVENOUS
Status: DISCONTINUED | OUTPATIENT
Start: 2020-01-01 | End: 2020-01-01

## 2020-01-01 RX ORDER — SODIUM CHLORIDE 9 MG/ML
125 INJECTION, SOLUTION INTRAVENOUS CONTINUOUS
Status: DISCONTINUED | OUTPATIENT
Start: 2020-01-01 | End: 2020-01-01

## 2020-01-01 RX ORDER — OXYCODONE AND ACETAMINOPHEN 7.5; 325 MG/1; MG/1
1 TABLET ORAL ONCE AS NEEDED
Status: DISCONTINUED | OUTPATIENT
Start: 2020-01-01 | End: 2020-01-01 | Stop reason: HOSPADM

## 2020-01-01 RX ORDER — LISINOPRIL 40 MG/1
40 TABLET ORAL DAILY
Status: DISCONTINUED | OUTPATIENT
Start: 2020-01-01 | End: 2020-01-01

## 2020-01-01 RX ORDER — ACETAMINOPHEN 160 MG/5ML
650 SOLUTION ORAL EVERY 4 HOURS PRN
Status: DISCONTINUED | OUTPATIENT
Start: 2020-01-01 | End: 2020-01-01

## 2020-01-01 RX ORDER — SCOLOPAMINE TRANSDERMAL SYSTEM 1 MG/1
1 PATCH, EXTENDED RELEASE TRANSDERMAL
Status: DISCONTINUED | OUTPATIENT
Start: 2020-01-01 | End: 2020-01-01 | Stop reason: HOSPADM

## 2020-01-01 RX ORDER — ACETAMINOPHEN 500 MG
1000 TABLET ORAL ONCE
Status: COMPLETED | OUTPATIENT
Start: 2020-01-01 | End: 2020-01-01

## 2020-01-01 RX ORDER — HALOPERIDOL 1 MG/1
1 TABLET ORAL EVERY 4 HOURS PRN
Status: CANCELLED | OUTPATIENT
Start: 2020-01-01

## 2020-01-01 RX ORDER — SODIUM CHLORIDE, SODIUM LACTATE, POTASSIUM CHLORIDE, CALCIUM CHLORIDE 600; 310; 30; 20 MG/100ML; MG/100ML; MG/100ML; MG/100ML
9 INJECTION, SOLUTION INTRAVENOUS CONTINUOUS
Status: DISCONTINUED | OUTPATIENT
Start: 2020-01-01 | End: 2020-01-01

## 2020-01-01 RX ORDER — PROMETHAZINE HYDROCHLORIDE 25 MG/1
25 TABLET ORAL ONCE AS NEEDED
Status: DISCONTINUED | OUTPATIENT
Start: 2020-01-01 | End: 2020-01-01

## 2020-01-01 RX ORDER — FENTANYL CITRATE 50 UG/ML
25 INJECTION, SOLUTION INTRAMUSCULAR; INTRAVENOUS
Status: DISCONTINUED | OUTPATIENT
Start: 2020-01-01 | End: 2020-01-01 | Stop reason: HOSPADM

## 2020-01-01 RX ORDER — HYDROCODONE BITARTRATE AND ACETAMINOPHEN 5; 325 MG/1; MG/1
1 TABLET ORAL EVERY 4 HOURS PRN
Qty: 10 TABLET | Refills: 0 | Status: SHIPPED | OUTPATIENT
Start: 2020-01-01 | End: 2020-01-01

## 2020-01-01 RX ORDER — HYDROCODONE BITARTRATE AND ACETAMINOPHEN 7.5; 325 MG/1; MG/1
1 TABLET ORAL ONCE AS NEEDED
Status: DISCONTINUED | OUTPATIENT
Start: 2020-01-01 | End: 2020-01-01

## 2020-01-01 RX ORDER — DOCUSATE SODIUM 100 MG/1
100 CAPSULE, LIQUID FILLED ORAL DAILY PRN
Status: DISCONTINUED | OUTPATIENT
Start: 2020-01-01 | End: 2020-01-01

## 2020-01-01 RX ORDER — MORPHINE SULFATE 20 MG/ML
20 SOLUTION ORAL
Status: CANCELLED | OUTPATIENT
Start: 2020-01-01 | End: 2020-05-09

## 2020-01-01 RX ORDER — NALOXONE HCL 0.4 MG/ML
0.2 VIAL (ML) INJECTION AS NEEDED
Status: DISCONTINUED | OUTPATIENT
Start: 2020-01-01 | End: 2020-01-01

## 2020-01-01 RX ORDER — LEVOTHYROXINE SODIUM 0.12 MG/1
125 TABLET ORAL
Qty: 30 TABLET | Refills: 0 | Status: ON HOLD | OUTPATIENT
Start: 2020-01-01 | End: 2020-01-01

## 2020-01-01 RX ORDER — ONDANSETRON 2 MG/ML
4 INJECTION INTRAMUSCULAR; INTRAVENOUS EVERY 4 HOURS PRN
Status: DISCONTINUED | OUTPATIENT
Start: 2020-01-01 | End: 2020-01-01

## 2020-01-01 RX ORDER — ROPIVACAINE HYDROCHLORIDE 2 MG/ML
INJECTION, SOLUTION EPIDURAL; INFILTRATION; PERINEURAL
Status: COMPLETED | OUTPATIENT
Start: 2020-01-01 | End: 2020-01-01

## 2020-01-01 RX ORDER — SODIUM CHLORIDE 9 MG/ML
60 INJECTION, SOLUTION INTRAVENOUS CONTINUOUS
Status: DISCONTINUED | OUTPATIENT
Start: 2020-01-01 | End: 2020-01-01

## 2020-01-01 RX ORDER — GABAPENTIN 400 MG/1
800 CAPSULE ORAL EVERY 8 HOURS SCHEDULED
Status: DISCONTINUED | OUTPATIENT
Start: 2020-01-01 | End: 2020-01-01

## 2020-01-01 RX ORDER — LORAZEPAM 2 MG/ML
2 INJECTION INTRAMUSCULAR
Status: DISCONTINUED | OUTPATIENT
Start: 2020-01-01 | End: 2020-01-01 | Stop reason: HOSPADM

## 2020-01-01 RX ORDER — MIDAZOLAM HYDROCHLORIDE 1 MG/ML
1 INJECTION INTRAMUSCULAR; INTRAVENOUS
Status: DISCONTINUED | OUTPATIENT
Start: 2020-01-01 | End: 2020-01-01 | Stop reason: HOSPADM

## 2020-01-01 RX ORDER — HYDROMORPHONE HYDROCHLORIDE 1 MG/ML
0.25 INJECTION, SOLUTION INTRAMUSCULAR; INTRAVENOUS; SUBCUTANEOUS
Status: DISCONTINUED | OUTPATIENT
Start: 2020-01-01 | End: 2020-01-01 | Stop reason: HOSPADM

## 2020-01-01 RX ORDER — ACETAMINOPHEN 325 MG/1
650 TABLET ORAL ONCE AS NEEDED
Status: DISCONTINUED | OUTPATIENT
Start: 2020-01-01 | End: 2020-01-01 | Stop reason: HOSPADM

## 2020-01-01 RX ORDER — SODIUM CHLORIDE, SODIUM LACTATE, POTASSIUM CHLORIDE, CALCIUM CHLORIDE 600; 310; 30; 20 MG/100ML; MG/100ML; MG/100ML; MG/100ML
9 INJECTION, SOLUTION INTRAVENOUS CONTINUOUS
Status: DISCONTINUED | OUTPATIENT
Start: 2020-01-01 | End: 2020-01-01 | Stop reason: HOSPADM

## 2020-01-01 RX ORDER — ACETAMINOPHEN 650 MG/1
650 SUPPOSITORY RECTAL EVERY 4 HOURS PRN
Status: CANCELLED | OUTPATIENT
Start: 2020-01-01

## 2020-01-01 RX ORDER — AMIODARONE HYDROCHLORIDE 200 MG/1
200 TABLET ORAL EVERY 12 HOURS SCHEDULED
Status: DISCONTINUED | OUTPATIENT
Start: 2020-01-01 | End: 2020-01-01

## 2020-01-01 RX ORDER — PROMETHAZINE HYDROCHLORIDE 25 MG/ML
6.25 INJECTION, SOLUTION INTRAMUSCULAR; INTRAVENOUS
Status: DISCONTINUED | OUTPATIENT
Start: 2020-01-01 | End: 2020-01-01

## 2020-01-01 RX ORDER — MORPHINE SULFATE 4 MG/ML
4 INJECTION, SOLUTION INTRAMUSCULAR; INTRAVENOUS
Status: DISCONTINUED | OUTPATIENT
Start: 2020-01-01 | End: 2020-01-01 | Stop reason: HOSPADM

## 2020-01-01 RX ORDER — HYDROCODONE BITARTRATE AND ACETAMINOPHEN 5; 325 MG/1; MG/1
1 TABLET ORAL EVERY 4 HOURS PRN
Status: DISCONTINUED | OUTPATIENT
Start: 2020-01-01 | End: 2020-01-01 | Stop reason: HOSPADM

## 2020-01-01 RX ORDER — FENTANYL CITRATE 50 UG/ML
INJECTION, SOLUTION INTRAMUSCULAR; INTRAVENOUS
Status: COMPLETED | OUTPATIENT
Start: 2020-01-01 | End: 2020-01-01

## 2020-01-01 RX ORDER — ONDANSETRON 2 MG/ML
4 INJECTION INTRAMUSCULAR; INTRAVENOUS ONCE AS NEEDED
Status: DISCONTINUED | OUTPATIENT
Start: 2020-01-01 | End: 2020-01-01

## 2020-01-01 RX ORDER — ACETAMINOPHEN 325 MG/1
650 TABLET ORAL ONCE AS NEEDED
Status: DISCONTINUED | OUTPATIENT
Start: 2020-01-01 | End: 2020-01-01

## 2020-01-01 RX ORDER — LORAZEPAM 2 MG/ML
2 INJECTION INTRAMUSCULAR
Status: CANCELLED | OUTPATIENT
Start: 2020-01-01 | End: 2020-05-09

## 2020-01-01 RX ORDER — PROMETHAZINE HYDROCHLORIDE 25 MG/1
25 TABLET ORAL ONCE AS NEEDED
Status: DISCONTINUED | OUTPATIENT
Start: 2020-01-01 | End: 2020-01-01 | Stop reason: HOSPADM

## 2020-01-01 RX ORDER — ACETAMINOPHEN 160 MG/5ML
650 SOLUTION ORAL EVERY 4 HOURS PRN
Status: CANCELLED | OUTPATIENT
Start: 2020-01-01

## 2020-01-01 RX ORDER — HYDROCODONE BITARTRATE AND ACETAMINOPHEN 5; 325 MG/1; MG/1
1 TABLET ORAL EVERY 6 HOURS PRN
Qty: 10 TABLET | Refills: 0 | Status: SHIPPED | OUTPATIENT
Start: 2020-01-01 | End: 2020-01-01

## 2020-01-01 RX ORDER — HYDROCODONE BITARTRATE AND ACETAMINOPHEN 5; 325 MG/1; MG/1
1 TABLET ORAL EVERY 6 HOURS PRN
Status: DISCONTINUED | OUTPATIENT
Start: 2020-01-01 | End: 2020-01-01 | Stop reason: SDUPTHER

## 2020-01-01 RX ORDER — METOPROLOL SUCCINATE 25 MG/1
25 TABLET, EXTENDED RELEASE ORAL DAILY
COMMUNITY
End: 2020-01-01 | Stop reason: HOSPADM

## 2020-01-01 RX ORDER — GABAPENTIN 300 MG/1
300 CAPSULE ORAL ONCE
Status: COMPLETED | OUTPATIENT
Start: 2020-01-01 | End: 2020-01-01

## 2020-01-01 RX ORDER — ASPIRIN 325 MG
325 TABLET, DELAYED RELEASE (ENTERIC COATED) ORAL DAILY
Status: DISCONTINUED | OUTPATIENT
Start: 2020-01-01 | End: 2020-01-01

## 2020-01-01 RX ORDER — METOPROLOL SUCCINATE 25 MG/1
25 TABLET, EXTENDED RELEASE ORAL ONCE
Status: COMPLETED | OUTPATIENT
Start: 2020-01-01 | End: 2020-01-01

## 2020-01-01 RX ORDER — POTASSIUM CHLORIDE 7.45 MG/ML
10 INJECTION INTRAVENOUS
Status: DISCONTINUED | OUTPATIENT
Start: 2020-01-01 | End: 2020-01-01

## 2020-01-01 RX ORDER — MORPHINE SULFATE 20 MG/ML
5 SOLUTION ORAL
Status: CANCELLED | OUTPATIENT
Start: 2020-01-01 | End: 2020-05-09

## 2020-01-01 RX ORDER — PROMETHAZINE HYDROCHLORIDE 25 MG/ML
12.5 INJECTION, SOLUTION INTRAMUSCULAR; INTRAVENOUS ONCE AS NEEDED
Status: DISCONTINUED | OUTPATIENT
Start: 2020-01-01 | End: 2020-01-01 | Stop reason: HOSPADM

## 2020-01-01 RX ORDER — SIMETHICONE 80 MG
80 TABLET,CHEWABLE ORAL 4 TIMES DAILY PRN
Status: DISCONTINUED | OUTPATIENT
Start: 2020-01-01 | End: 2020-01-01

## 2020-01-01 RX ORDER — LISINOPRIL 40 MG/1
40 TABLET ORAL DAILY
COMMUNITY
End: 2020-01-01 | Stop reason: HOSPADM

## 2020-01-01 RX ORDER — LORAZEPAM 2 MG/ML
0.5 CONCENTRATE ORAL
Status: CANCELLED | OUTPATIENT
Start: 2020-01-01 | End: 2020-05-09

## 2020-01-01 RX ORDER — MORPHINE SULFATE 2 MG/ML
4 INJECTION, SOLUTION INTRAMUSCULAR; INTRAVENOUS
Status: DISCONTINUED | OUTPATIENT
Start: 2020-01-01 | End: 2020-01-01 | Stop reason: HOSPADM

## 2020-01-01 RX ORDER — FENTANYL CITRATE 50 UG/ML
INJECTION, SOLUTION INTRAMUSCULAR; INTRAVENOUS AS NEEDED
Status: DISCONTINUED | OUTPATIENT
Start: 2020-01-01 | End: 2020-01-01 | Stop reason: SURG

## 2020-01-01 RX ORDER — MORPHINE SULFATE 2 MG/ML
1 INJECTION, SOLUTION INTRAMUSCULAR; INTRAVENOUS
Status: DISCONTINUED | OUTPATIENT
Start: 2020-01-01 | End: 2020-01-01

## 2020-01-01 RX ORDER — TC 99M MEDRONATE 20 MG/10ML
23 INJECTION, POWDER, LYOPHILIZED, FOR SOLUTION INTRAVENOUS
Status: DISCONTINUED | OUTPATIENT
Start: 2020-01-01 | End: 2020-01-01

## 2020-01-01 RX ADMIN — ONDANSETRON 4 MG: 2 INJECTION INTRAMUSCULAR; INTRAVENOUS at 12:31

## 2020-01-01 RX ADMIN — DOCUSATE SODIUM 100 MG: 100 CAPSULE, LIQUID FILLED ORAL at 05:04

## 2020-01-01 RX ADMIN — DOCUSATE SODIUM 100 MG: 100 CAPSULE, LIQUID FILLED ORAL at 08:51

## 2020-01-01 RX ADMIN — FENTANYL CITRATE 25 MCG: 50 INJECTION INTRAMUSCULAR; INTRAVENOUS at 15:58

## 2020-01-01 RX ADMIN — LORAZEPAM 0.5 MG: 2 INJECTION INTRAMUSCULAR; INTRAVENOUS at 13:10

## 2020-01-01 RX ADMIN — SODIUM CHLORIDE, PRESERVATIVE FREE 10 ML: 5 INJECTION INTRAVENOUS at 08:17

## 2020-01-01 RX ADMIN — LEVOTHYROXINE SODIUM 112 MCG: 112 TABLET ORAL at 16:04

## 2020-01-01 RX ADMIN — ASPIRIN 325 MG: 325 TABLET, COATED ORAL at 08:10

## 2020-01-01 RX ADMIN — LEVOFLOXACIN 500 MG: 5 INJECTION, SOLUTION INTRAVENOUS at 17:13

## 2020-01-01 RX ADMIN — HYDROCODONE BITARTRATE AND ACETAMINOPHEN 1 TABLET: 5; 325 TABLET ORAL at 05:55

## 2020-01-01 RX ADMIN — GABAPENTIN 800 MG: 400 CAPSULE ORAL at 06:53

## 2020-01-01 RX ADMIN — AMIODARONE HYDROCHLORIDE 200 MG: 200 TABLET ORAL at 11:25

## 2020-01-01 RX ADMIN — GLYCOPYRROLATE 0.4 MG: 0.2 INJECTION, SOLUTION INTRAMUSCULAR; INTRAVITREAL at 19:49

## 2020-01-01 RX ADMIN — FAMOTIDINE 20 MG: 10 INJECTION, SOLUTION INTRAVENOUS at 12:28

## 2020-01-01 RX ADMIN — PANTOPRAZOLE SODIUM 40 MG: 40 TABLET, DELAYED RELEASE ORAL at 06:13

## 2020-01-01 RX ADMIN — ROPIVACAINE HYDROCHLORIDE 40 ML: 5 INJECTION, SOLUTION EPIDURAL; INFILTRATION; PERINEURAL at 13:53

## 2020-01-01 RX ADMIN — LEVOTHYROXINE SODIUM 125 MCG: 125 TABLET ORAL at 07:16

## 2020-01-01 RX ADMIN — MAGNESIUM SULFATE HEPTAHYDRATE 2 G: 40 INJECTION, SOLUTION INTRAVENOUS at 15:52

## 2020-01-01 RX ADMIN — GABAPENTIN 300 MG: 300 CAPSULE ORAL at 13:41

## 2020-01-01 RX ADMIN — SODIUM CHLORIDE 125 ML/HR: 9 INJECTION, SOLUTION INTRAVENOUS at 12:30

## 2020-01-01 RX ADMIN — METOPROLOL SUCCINATE 25 MG: 25 TABLET, EXTENDED RELEASE ORAL at 16:03

## 2020-01-01 RX ADMIN — METOPROLOL SUCCINATE 25 MG: 25 TABLET, EXTENDED RELEASE ORAL at 07:47

## 2020-01-01 RX ADMIN — GABAPENTIN 100 MG: 100 CAPSULE ORAL at 22:06

## 2020-01-01 RX ADMIN — FENTANYL CITRATE 25 MCG: 0.05 INJECTION, SOLUTION INTRAMUSCULAR; INTRAVENOUS at 17:10

## 2020-01-01 RX ADMIN — ONDANSETRON HYDROCHLORIDE 4 MG: 2 SOLUTION INTRAMUSCULAR; INTRAVENOUS at 13:13

## 2020-01-01 RX ADMIN — LEVOTHYROXINE SODIUM 112 MCG: 112 TABLET ORAL at 06:09

## 2020-01-01 RX ADMIN — ASPIRIN 81 MG: 81 TABLET, CHEWABLE ORAL at 10:20

## 2020-01-01 RX ADMIN — MAGNESIUM SULFATE 2 G: 2 INJECTION INTRAVENOUS at 17:07

## 2020-01-01 RX ADMIN — FENTANYL CITRATE 25 MCG: 0.05 INJECTION, SOLUTION INTRAMUSCULAR; INTRAVENOUS at 17:22

## 2020-01-01 RX ADMIN — POTASSIUM CHLORIDE AND SODIUM CHLORIDE 75 ML/HR: 900; 150 INJECTION, SOLUTION INTRAVENOUS at 08:55

## 2020-01-01 RX ADMIN — PANTOPRAZOLE SODIUM 40 MG: 40 TABLET, DELAYED RELEASE ORAL at 06:35

## 2020-01-01 RX ADMIN — LIDOCAINE HYDROCHLORIDE 60 MG: 20 INJECTION, SOLUTION INFILTRATION; PERINEURAL at 14:26

## 2020-01-01 RX ADMIN — LEVOTHYROXINE SODIUM 112 MCG: 112 TABLET ORAL at 06:53

## 2020-01-01 RX ADMIN — PANTOPRAZOLE SODIUM 40 MG: 40 TABLET, DELAYED RELEASE ORAL at 05:03

## 2020-01-01 RX ADMIN — LIDOCAINE HYDROCHLORIDE 60 MG: 20 INJECTION, SOLUTION INFILTRATION; PERINEURAL at 12:50

## 2020-01-01 RX ADMIN — HYDROCODONE BITARTRATE AND ACETAMINOPHEN 1 TABLET: 5; 325 TABLET ORAL at 22:06

## 2020-01-01 RX ADMIN — METOPROLOL SUCCINATE 25 MG: 25 TABLET, EXTENDED RELEASE ORAL at 08:42

## 2020-01-01 RX ADMIN — ASPIRIN 325 MG: 325 TABLET, COATED ORAL at 08:57

## 2020-01-01 RX ADMIN — GLYCOPYRROLATE 0.4 MG: 0.2 INJECTION, SOLUTION INTRAMUSCULAR; INTRAVITREAL at 20:28

## 2020-01-01 RX ADMIN — LEVOTHYROXINE SODIUM 112 MCG: 112 TABLET ORAL at 06:34

## 2020-01-01 RX ADMIN — GABAPENTIN 100 MG: 100 CAPSULE ORAL at 21:29

## 2020-01-01 RX ADMIN — ONDANSETRON 4 MG: 2 INJECTION INTRAMUSCULAR; INTRAVENOUS at 06:38

## 2020-01-01 RX ADMIN — METOPROLOL SUCCINATE 25 MG: 25 TABLET, EXTENDED RELEASE ORAL at 09:18

## 2020-01-01 RX ADMIN — GABAPENTIN 400 MG: 400 CAPSULE ORAL at 05:31

## 2020-01-01 RX ADMIN — GABAPENTIN 100 MG: 100 CAPSULE ORAL at 21:07

## 2020-01-01 RX ADMIN — SUGAMMADEX 200 MG: 100 INJECTION, SOLUTION INTRAVENOUS at 16:35

## 2020-01-01 RX ADMIN — MORPHINE SULFATE 2 MG: 2 INJECTION, SOLUTION INTRAMUSCULAR; INTRAVENOUS at 12:37

## 2020-01-01 RX ADMIN — PANTOPRAZOLE SODIUM 40 MG: 40 TABLET, DELAYED RELEASE ORAL at 05:32

## 2020-01-01 RX ADMIN — PHENYLEPHRINE HYDROCHLORIDE 100 MCG: 10 INJECTION INTRAVENOUS at 14:43

## 2020-01-01 RX ADMIN — MORPHINE SULFATE 2 MG: 2 INJECTION, SOLUTION INTRAMUSCULAR; INTRAVENOUS at 08:53

## 2020-01-01 RX ADMIN — GABAPENTIN 100 MG: 100 CAPSULE ORAL at 15:08

## 2020-01-01 RX ADMIN — LEVOTHYROXINE SODIUM 125 MCG: 125 TABLET ORAL at 05:37

## 2020-01-01 RX ADMIN — GABAPENTIN 100 MG: 100 CAPSULE ORAL at 14:12

## 2020-01-01 RX ADMIN — LEVOTHYROXINE SODIUM 112 MCG: 112 TABLET ORAL at 06:47

## 2020-01-01 RX ADMIN — LIDOCAINE HYDROCHLORIDE 80 MG: 20 INJECTION, SOLUTION INFILTRATION; PERINEURAL at 15:28

## 2020-01-01 RX ADMIN — HYDROCODONE BITARTRATE AND ACETAMINOPHEN 1 TABLET: 5; 325 TABLET ORAL at 01:02

## 2020-01-01 RX ADMIN — SODIUM CHLORIDE, PRESERVATIVE FREE 10 ML: 5 INJECTION INTRAVENOUS at 21:28

## 2020-01-01 RX ADMIN — CLINDAMYCIN PHOSPHATE 600 MG: 12 INJECTION, SOLUTION INTRAVENOUS at 14:18

## 2020-01-01 RX ADMIN — LEVOTHYROXINE SODIUM 112 MCG: 112 TABLET ORAL at 06:05

## 2020-01-01 RX ADMIN — METOPROLOL SUCCINATE 25 MG: 25 TABLET, EXTENDED RELEASE ORAL at 08:29

## 2020-01-01 RX ADMIN — LEVOTHYROXINE SODIUM 112 MCG: 112 TABLET ORAL at 13:30

## 2020-01-01 RX ADMIN — GLYCOPYRROLATE 0.4 MG: 0.2 INJECTION, SOLUTION INTRAMUSCULAR; INTRAVITREAL at 14:01

## 2020-01-01 RX ADMIN — BISACODYL 10 MG: 10 SUPPOSITORY RECTAL at 11:59

## 2020-01-01 RX ADMIN — ACETAMINOPHEN 650 MG: 325 TABLET, FILM COATED ORAL at 21:29

## 2020-01-01 RX ADMIN — TRAMADOL HYDROCHLORIDE 50 MG: 50 TABLET, FILM COATED ORAL at 06:53

## 2020-01-01 RX ADMIN — VANCOMYCIN HYDROCHLORIDE 1250 MG: 10 INJECTION, POWDER, LYOPHILIZED, FOR SOLUTION INTRAVENOUS at 19:22

## 2020-01-01 RX ADMIN — FENTANYL CITRATE 25 MCG: 50 INJECTION INTRAMUSCULAR; INTRAVENOUS at 14:37

## 2020-01-01 RX ADMIN — LEVOTHYROXINE SODIUM 112 MCG: 112 TABLET ORAL at 06:31

## 2020-01-01 RX ADMIN — MEPIVACAINE HYDROCHLORIDE 10 ML: 15 INJECTION, SOLUTION EPIDURAL; INFILTRATION at 12:10

## 2020-01-01 RX ADMIN — MAGNESIUM SULFATE 2 G: 2 INJECTION INTRAVENOUS at 12:18

## 2020-01-01 RX ADMIN — DIGOXIN 250 MCG: 0.25 INJECTION INTRAMUSCULAR; INTRAVENOUS at 08:57

## 2020-01-01 RX ADMIN — SUCCINYLCHOLINE CHLORIDE 80 MG: 20 INJECTION, SOLUTION INTRAMUSCULAR; INTRAVENOUS; PARENTERAL at 15:28

## 2020-01-01 RX ADMIN — CLINDAMYCIN PHOSPHATE 600 MG: 600 INJECTION, SOLUTION INTRAVENOUS at 05:25

## 2020-01-01 RX ADMIN — ASPIRIN 325 MG: 325 TABLET, COATED ORAL at 07:47

## 2020-01-01 RX ADMIN — METOPROLOL SUCCINATE 25 MG: 25 TABLET, EXTENDED RELEASE ORAL at 08:14

## 2020-01-01 RX ADMIN — LEVOTHYROXINE SODIUM 112 MCG: 112 TABLET ORAL at 05:57

## 2020-01-01 RX ADMIN — ONDANSETRON 4 MG: 2 INJECTION INTRAMUSCULAR; INTRAVENOUS at 03:27

## 2020-01-01 RX ADMIN — ONDANSETRON 4 MG: 2 INJECTION INTRAMUSCULAR; INTRAVENOUS at 08:46

## 2020-01-01 RX ADMIN — HYDROCODONE BITARTRATE AND ACETAMINOPHEN 1 TABLET: 5; 325 TABLET ORAL at 18:08

## 2020-01-01 RX ADMIN — GLYCOPYRROLATE 0.4 MG: 0.2 INJECTION, SOLUTION INTRAMUSCULAR; INTRAVITREAL at 05:58

## 2020-01-01 RX ADMIN — MORPHINE SULFATE 4 MG: 2 INJECTION, SOLUTION INTRAMUSCULAR; INTRAVENOUS at 03:27

## 2020-01-01 RX ADMIN — ASPIRIN 325 MG: 325 TABLET, COATED ORAL at 08:29

## 2020-01-01 RX ADMIN — GABAPENTIN 100 MG: 100 CAPSULE ORAL at 05:37

## 2020-01-01 RX ADMIN — PHENYLEPHRINE HYDROCHLORIDE 100 MCG: 10 INJECTION INTRAVENOUS at 14:29

## 2020-01-01 RX ADMIN — POTASSIUM CHLORIDE 40 MEQ: 10 CAPSULE, COATED, EXTENDED RELEASE ORAL at 15:32

## 2020-01-01 RX ADMIN — DIGOXIN 125 MCG: 0.25 INJECTION INTRAMUSCULAR; INTRAVENOUS at 17:06

## 2020-01-01 RX ADMIN — HYDROCODONE BITARTRATE AND ACETAMINOPHEN 1 TABLET: 5; 325 TABLET ORAL at 14:34

## 2020-01-01 RX ADMIN — FENTANYL CITRATE 50 MCG: 50 INJECTION INTRAMUSCULAR; INTRAVENOUS at 15:45

## 2020-01-01 RX ADMIN — HYDROCODONE BITARTRATE AND ACETAMINOPHEN 1 TABLET: 5; 325 TABLET ORAL at 05:20

## 2020-01-01 RX ADMIN — GABAPENTIN 800 MG: 400 CAPSULE ORAL at 13:30

## 2020-01-01 RX ADMIN — MORPHINE SULFATE 2 MG: 2 INJECTION, SOLUTION INTRAMUSCULAR; INTRAVENOUS at 04:47

## 2020-01-01 RX ADMIN — GLYCOPYRROLATE 0.4 MG: 0.2 INJECTION, SOLUTION INTRAMUSCULAR; INTRAVITREAL at 13:10

## 2020-01-01 RX ADMIN — HYDROCODONE BITARTRATE AND ACETAMINOPHEN 1 TABLET: 5; 325 TABLET ORAL at 16:11

## 2020-01-01 RX ADMIN — FENTANYL CITRATE 50 MCG: 50 INJECTION, SOLUTION INTRAMUSCULAR; INTRAVENOUS at 13:53

## 2020-01-01 RX ADMIN — TRAMADOL HYDROCHLORIDE 50 MG: 50 TABLET, FILM COATED ORAL at 08:11

## 2020-01-01 RX ADMIN — MORPHINE SULFATE 2 MG: 2 INJECTION, SOLUTION INTRAMUSCULAR; INTRAVENOUS at 11:26

## 2020-01-01 RX ADMIN — HYDROCODONE BITARTRATE AND ACETAMINOPHEN 1 TABLET: 5; 325 TABLET ORAL at 18:42

## 2020-01-01 RX ADMIN — PROPOFOL 50 MG: 10 INJECTION, EMULSION INTRAVENOUS at 15:28

## 2020-01-01 RX ADMIN — VANCOMYCIN HYDROCHLORIDE 1250 MG: 10 INJECTION, POWDER, LYOPHILIZED, FOR SOLUTION INTRAVENOUS at 11:03

## 2020-01-01 RX ADMIN — MORPHINE SULFATE 2 MG: 2 INJECTION, SOLUTION INTRAMUSCULAR; INTRAVENOUS at 20:28

## 2020-01-01 RX ADMIN — GABAPENTIN 100 MG: 100 CAPSULE ORAL at 14:47

## 2020-01-01 RX ADMIN — MORPHINE SULFATE 2 MG: 2 INJECTION, SOLUTION INTRAMUSCULAR; INTRAVENOUS at 18:28

## 2020-01-01 RX ADMIN — PANTOPRAZOLE SODIUM 40 MG: 40 TABLET, DELAYED RELEASE ORAL at 06:53

## 2020-01-01 RX ADMIN — ONDANSETRON 4 MG: 2 INJECTION INTRAMUSCULAR; INTRAVENOUS at 12:48

## 2020-01-01 RX ADMIN — HYDROCODONE BITARTRATE AND ACETAMINOPHEN 1 TABLET: 5; 325 TABLET ORAL at 15:47

## 2020-01-01 RX ADMIN — PHENYLEPHRINE HYDROCHLORIDE 100 MCG: 10 INJECTION INTRAVENOUS at 15:25

## 2020-01-01 RX ADMIN — SODIUM CHLORIDE 60 ML/HR: 9 INJECTION, SOLUTION INTRAVENOUS at 11:26

## 2020-01-01 RX ADMIN — GLYCOPYRROLATE 0.4 MG: 0.2 INJECTION, SOLUTION INTRAMUSCULAR; INTRAVITREAL at 12:10

## 2020-01-01 RX ADMIN — VANCOMYCIN HYDROCHLORIDE 1250 MG: 10 INJECTION, POWDER, LYOPHILIZED, FOR SOLUTION INTRAVENOUS at 11:46

## 2020-01-01 RX ADMIN — LISINOPRIL 40 MG: 40 TABLET ORAL at 16:03

## 2020-01-01 RX ADMIN — GABAPENTIN 400 MG: 400 CAPSULE ORAL at 06:34

## 2020-01-01 RX ADMIN — LORAZEPAM 0.5 MG: 2 INJECTION INTRAMUSCULAR; INTRAVENOUS at 04:47

## 2020-01-01 RX ADMIN — GABAPENTIN 400 MG: 400 CAPSULE ORAL at 21:21

## 2020-01-01 RX ADMIN — GLYCOPYRROLATE 0.2 MG: 0.2 INJECTION, SOLUTION INTRAMUSCULAR; INTRAVITREAL at 01:42

## 2020-01-01 RX ADMIN — SODIUM CHLORIDE, POTASSIUM CHLORIDE, SODIUM LACTATE AND CALCIUM CHLORIDE: 600; 310; 30; 20 INJECTION, SOLUTION INTRAVENOUS at 12:38

## 2020-01-01 RX ADMIN — ROPIVACAINE HYDROCHLORIDE 20 ML: 5 INJECTION, SOLUTION EPIDURAL; INFILTRATION; PERINEURAL at 12:10

## 2020-01-01 RX ADMIN — MORPHINE SULFATE 2 MG: 2 INJECTION, SOLUTION INTRAMUSCULAR; INTRAVENOUS at 14:02

## 2020-01-01 RX ADMIN — ROCURONIUM BROMIDE 10 MG: 10 INJECTION, SOLUTION INTRAVENOUS at 16:15

## 2020-01-01 RX ADMIN — PHENYLEPHRINE HYDROCHLORIDE 200 MCG: 10 INJECTION INTRAVENOUS at 15:09

## 2020-01-01 RX ADMIN — LEVOTHYROXINE SODIUM 112 MCG: 112 TABLET ORAL at 05:02

## 2020-01-01 RX ADMIN — ACETAMINOPHEN 650 MG: 325 TABLET, FILM COATED ORAL at 06:34

## 2020-01-01 RX ADMIN — HYDROCODONE BITARTRATE AND ACETAMINOPHEN 1 TABLET: 5; 325 TABLET ORAL at 05:37

## 2020-01-01 RX ADMIN — SODIUM CHLORIDE 75 ML/HR: 9 INJECTION, SOLUTION INTRAVENOUS at 22:48

## 2020-01-01 RX ADMIN — GLYCOPYRROLATE 0.4 MG: 0.2 INJECTION, SOLUTION INTRAMUSCULAR; INTRAVITREAL at 08:07

## 2020-01-01 RX ADMIN — PROPOFOL 25 MCG/KG/MIN: 10 INJECTION, EMULSION INTRAVENOUS at 12:50

## 2020-01-01 RX ADMIN — CLINDAMYCIN PHOSPHATE 600 MG: 600 INJECTION, SOLUTION INTRAVENOUS at 21:56

## 2020-01-01 RX ADMIN — PANTOPRAZOLE SODIUM 40 MG: 40 TABLET, DELAYED RELEASE ORAL at 05:37

## 2020-01-01 RX ADMIN — Medication 23 MILLICURIE: at 10:45

## 2020-01-01 RX ADMIN — GABAPENTIN 100 MG: 100 CAPSULE ORAL at 05:03

## 2020-01-01 RX ADMIN — Medication 5 ML: at 11:27

## 2020-01-01 RX ADMIN — LEVOTHYROXINE SODIUM 125 MCG: 125 TABLET ORAL at 05:33

## 2020-01-01 RX ADMIN — CEFTRIAXONE SODIUM 1 G: 1 INJECTION, SOLUTION INTRAVENOUS at 14:05

## 2020-01-01 RX ADMIN — HYDROCODONE BITARTRATE AND ACETAMINOPHEN 1 TABLET: 5; 325 TABLET ORAL at 12:05

## 2020-01-01 RX ADMIN — PANTOPRAZOLE SODIUM 40 MG: 40 TABLET, DELAYED RELEASE ORAL at 05:55

## 2020-01-01 RX ADMIN — HYDROCODONE BITARTRATE AND ACETAMINOPHEN 1 TABLET: 5; 325 TABLET ORAL at 02:35

## 2020-01-01 RX ADMIN — MORPHINE SULFATE 2 MG: 2 INJECTION, SOLUTION INTRAMUSCULAR; INTRAVENOUS at 19:49

## 2020-01-01 RX ADMIN — PANTOPRAZOLE SODIUM 40 MG: 40 TABLET, DELAYED RELEASE ORAL at 06:34

## 2020-01-01 RX ADMIN — GABAPENTIN 400 MG: 400 CAPSULE ORAL at 22:16

## 2020-01-01 RX ADMIN — SIMETHICONE CHEW TAB 80 MG 80 MG: 80 TABLET ORAL at 17:06

## 2020-01-01 RX ADMIN — GLYCOPYRROLATE 0.4 MG: 0.2 INJECTION, SOLUTION INTRAMUSCULAR; INTRAVITREAL at 08:53

## 2020-01-01 RX ADMIN — HYDROCODONE BITARTRATE AND ACETAMINOPHEN 1 TABLET: 10; 325 TABLET ORAL at 05:34

## 2020-01-01 RX ADMIN — MORPHINE SULFATE 2 MG: 2 INJECTION, SOLUTION INTRAMUSCULAR; INTRAVENOUS at 00:30

## 2020-01-01 RX ADMIN — ACETAMINOPHEN 650 MG: 325 TABLET, FILM COATED ORAL at 11:25

## 2020-01-01 RX ADMIN — HYDROCODONE BITARTRATE AND ACETAMINOPHEN 1 TABLET: 5; 325 TABLET ORAL at 21:24

## 2020-01-01 RX ADMIN — HYDROCODONE BITARTRATE AND ACETAMINOPHEN 1 TABLET: 5; 325 TABLET ORAL at 04:57

## 2020-01-01 RX ADMIN — METOPROLOL SUCCINATE 25 MG: 25 TABLET, EXTENDED RELEASE ORAL at 10:20

## 2020-01-01 RX ADMIN — MORPHINE SULFATE 2 MG: 2 INJECTION, SOLUTION INTRAMUSCULAR; INTRAVENOUS at 08:08

## 2020-01-01 RX ADMIN — GABAPENTIN 400 MG: 400 CAPSULE ORAL at 06:53

## 2020-01-01 RX ADMIN — LEVOTHYROXINE SODIUM 112 MCG: 112 TABLET ORAL at 05:20

## 2020-01-01 RX ADMIN — ASPIRIN 325 MG: 325 TABLET, COATED ORAL at 08:17

## 2020-01-01 RX ADMIN — ONDANSETRON 4 MG: 2 INJECTION INTRAMUSCULAR; INTRAVENOUS at 20:54

## 2020-01-01 RX ADMIN — METOPROLOL SUCCINATE 25 MG: 25 TABLET, EXTENDED RELEASE ORAL at 13:30

## 2020-01-01 RX ADMIN — GABAPENTIN 100 MG: 100 CAPSULE ORAL at 15:28

## 2020-01-01 RX ADMIN — DEXAMETHASONE SODIUM PHOSPHATE 8 MG: 10 INJECTION INTRAMUSCULAR; INTRAVENOUS at 15:49

## 2020-01-01 RX ADMIN — SODIUM CHLORIDE, PRESERVATIVE FREE 10 ML: 5 INJECTION INTRAVENOUS at 20:48

## 2020-01-01 RX ADMIN — MORPHINE SULFATE 2 MG: 2 INJECTION, SOLUTION INTRAMUSCULAR; INTRAVENOUS at 16:57

## 2020-01-01 RX ADMIN — POTASSIUM CHLORIDE AND SODIUM CHLORIDE 75 ML/HR: 900; 150 INJECTION, SOLUTION INTRAVENOUS at 06:09

## 2020-01-01 RX ADMIN — MORPHINE SULFATE 2 MG: 2 INJECTION, SOLUTION INTRAMUSCULAR; INTRAVENOUS at 05:58

## 2020-01-01 RX ADMIN — PANTOPRAZOLE SODIUM 40 MG: 40 TABLET, DELAYED RELEASE ORAL at 05:30

## 2020-01-01 RX ADMIN — SODIUM CHLORIDE, PRESERVATIVE FREE 10 ML: 5 INJECTION INTRAVENOUS at 09:18

## 2020-01-01 RX ADMIN — GABAPENTIN 100 MG: 100 CAPSULE ORAL at 21:24

## 2020-01-01 RX ADMIN — ROPIVACAINE HYDROCHLORIDE 10 ML: 2 INJECTION, SOLUTION EPIDURAL; INFILTRATION at 12:10

## 2020-01-01 RX ADMIN — CLINDAMYCIN PHOSPHATE 600 MG: 600 INJECTION, SOLUTION INTRAVENOUS at 14:34

## 2020-01-01 RX ADMIN — PROPOFOL 20 MG: 10 INJECTION, EMULSION INTRAVENOUS at 12:50

## 2020-01-01 RX ADMIN — GLYCOPYRROLATE 0.4 MG: 0.2 INJECTION, SOLUTION INTRAMUSCULAR; INTRAVITREAL at 04:47

## 2020-01-01 RX ADMIN — MAGNESIUM SULFATE 2 G: 2 INJECTION INTRAVENOUS at 14:06

## 2020-01-01 RX ADMIN — ONDANSETRON 4 MG: 2 INJECTION INTRAMUSCULAR; INTRAVENOUS at 21:32

## 2020-01-01 RX ADMIN — GABAPENTIN 100 MG: 100 CAPSULE ORAL at 15:04

## 2020-01-01 RX ADMIN — ACETAMINOPHEN 650 MG: 325 TABLET, FILM COATED ORAL at 06:18

## 2020-01-01 RX ADMIN — POTASSIUM CHLORIDE 40 MEQ: 10 CAPSULE, COATED, EXTENDED RELEASE ORAL at 20:35

## 2020-01-01 RX ADMIN — Medication 5 ML: at 16:32

## 2020-01-01 RX ADMIN — SODIUM CHLORIDE 750 MG: 900 INJECTION, SOLUTION INTRAVENOUS at 17:07

## 2020-01-01 RX ADMIN — POTASSIUM CHLORIDE AND SODIUM CHLORIDE 50 ML/HR: 900; 150 INJECTION, SOLUTION INTRAVENOUS at 05:20

## 2020-01-01 RX ADMIN — GABAPENTIN 100 MG: 100 CAPSULE ORAL at 04:57

## 2020-01-01 RX ADMIN — HYDROCODONE BITARTRATE AND ACETAMINOPHEN 1 TABLET: 5; 325 TABLET ORAL at 06:47

## 2020-01-01 RX ADMIN — METOPROLOL SUCCINATE 12.5 MG: 25 TABLET, EXTENDED RELEASE ORAL at 08:45

## 2020-01-01 RX ADMIN — METOPROLOL SUCCINATE 25 MG: 25 TABLET, EXTENDED RELEASE ORAL at 08:55

## 2020-01-01 RX ADMIN — DOCUSATE SODIUM 100 MG: 100 CAPSULE, LIQUID FILLED ORAL at 05:20

## 2020-01-01 RX ADMIN — PANTOPRAZOLE SODIUM 40 MG: 40 TABLET, DELAYED RELEASE ORAL at 06:06

## 2020-01-01 RX ADMIN — POTASSIUM CHLORIDE AND SODIUM CHLORIDE 75 ML/HR: 900; 150 INJECTION, SOLUTION INTRAVENOUS at 19:02

## 2020-01-01 RX ADMIN — GABAPENTIN 100 MG: 100 CAPSULE ORAL at 21:20

## 2020-01-01 RX ADMIN — LEVOTHYROXINE SODIUM 112 MCG: 112 TABLET ORAL at 05:32

## 2020-01-01 RX ADMIN — ROCURONIUM BROMIDE 30 MG: 10 INJECTION, SOLUTION INTRAVENOUS at 15:45

## 2020-01-01 RX ADMIN — HYDROCODONE BITARTRATE AND ACETAMINOPHEN 1 TABLET: 5; 325 TABLET ORAL at 21:29

## 2020-01-01 RX ADMIN — HYDROCODONE BITARTRATE AND ACETAMINOPHEN 1 TABLET: 5; 325 TABLET ORAL at 12:41

## 2020-01-01 RX ADMIN — GABAPENTIN 100 MG: 100 CAPSULE ORAL at 06:13

## 2020-01-01 RX ADMIN — ACETAMINOPHEN 1000 MG: 500 TABLET, FILM COATED ORAL at 18:52

## 2020-01-01 RX ADMIN — MORPHINE SULFATE 2 MG: 2 INJECTION, SOLUTION INTRAMUSCULAR; INTRAVENOUS at 13:10

## 2020-01-01 RX ADMIN — LORAZEPAM 0.5 MG: 2 INJECTION INTRAMUSCULAR; INTRAVENOUS at 17:33

## 2020-01-01 RX ADMIN — GABAPENTIN 400 MG: 400 CAPSULE ORAL at 06:35

## 2020-01-01 RX ADMIN — ONDANSETRON HYDROCHLORIDE 4 MG: 2 SOLUTION INTRAMUSCULAR; INTRAVENOUS at 16:17

## 2020-01-01 RX ADMIN — GABAPENTIN 400 MG: 400 CAPSULE ORAL at 13:35

## 2020-01-01 RX ADMIN — ASPIRIN 81 MG: 81 TABLET, CHEWABLE ORAL at 08:55

## 2020-01-01 RX ADMIN — GABAPENTIN 100 MG: 100 CAPSULE ORAL at 16:34

## 2020-01-01 RX ADMIN — GABAPENTIN 100 MG: 100 CAPSULE ORAL at 05:34

## 2020-01-01 RX ADMIN — VANCOMYCIN HYDROCHLORIDE 500 MG: 500 INJECTION, POWDER, LYOPHILIZED, FOR SOLUTION INTRAVENOUS at 14:40

## 2020-01-01 RX ADMIN — MORPHINE SULFATE 2 MG: 2 INJECTION, SOLUTION INTRAMUSCULAR; INTRAVENOUS at 12:11

## 2020-01-01 RX ADMIN — ASPIRIN 325 MG: 325 TABLET, COATED ORAL at 08:09

## 2020-01-01 RX ADMIN — GABAPENTIN 100 MG: 100 CAPSULE ORAL at 06:09

## 2020-01-01 RX ADMIN — MORPHINE SULFATE 2 MG: 2 INJECTION, SOLUTION INTRAMUSCULAR; INTRAVENOUS at 04:38

## 2020-01-01 RX ADMIN — GLYCOPYRROLATE 0.4 MG: 0.2 INJECTION, SOLUTION INTRAMUSCULAR; INTRAVITREAL at 00:30

## 2020-01-01 RX ADMIN — GABAPENTIN 400 MG: 400 CAPSULE ORAL at 13:12

## 2020-01-01 RX ADMIN — PHENYLEPHRINE HYDROCHLORIDE 100 MCG: 10 INJECTION INTRAVENOUS at 15:30

## 2020-01-01 RX ADMIN — PANTOPRAZOLE SODIUM 40 MG: 40 TABLET, DELAYED RELEASE ORAL at 06:47

## 2020-01-01 RX ADMIN — GABAPENTIN 100 MG: 100 CAPSULE ORAL at 21:56

## 2020-01-01 RX ADMIN — LEVOFLOXACIN 500 MG: 5 INJECTION, SOLUTION INTRAVENOUS at 16:41

## 2020-01-01 RX ADMIN — FENTANYL CITRATE 25 MCG: 50 INJECTION INTRAMUSCULAR; INTRAVENOUS at 15:55

## 2020-01-01 RX ADMIN — PHENYLEPHRINE HYDROCHLORIDE 100 MCG: 10 INJECTION INTRAVENOUS at 15:01

## 2020-01-01 RX ADMIN — LEVOTHYROXINE SODIUM 112 MCG: 112 TABLET ORAL at 06:35

## 2020-01-01 RX ADMIN — ACETAMINOPHEN 650 MG: 325 TABLET, FILM COATED ORAL at 05:31

## 2020-01-01 RX ADMIN — METOPROLOL SUCCINATE 25 MG: 25 TABLET, EXTENDED RELEASE ORAL at 08:09

## 2020-01-01 RX ADMIN — HYDROCODONE BITARTRATE AND ACETAMINOPHEN 1 TABLET: 5; 325 TABLET ORAL at 18:01

## 2020-01-01 RX ADMIN — MORPHINE SULFATE 2 MG: 2 INJECTION, SOLUTION INTRAMUSCULAR; INTRAVENOUS at 12:48

## 2020-01-01 RX ADMIN — METOPROLOL SUCCINATE 12.5 MG: 25 TABLET, EXTENDED RELEASE ORAL at 08:40

## 2020-01-01 RX ADMIN — HYDROCODONE BITARTRATE AND ACETAMINOPHEN 1 TABLET: 5; 325 TABLET ORAL at 22:39

## 2020-01-01 RX ADMIN — HYDROCODONE BITARTRATE AND ACETAMINOPHEN 1 TABLET: 5; 325 TABLET ORAL at 01:31

## 2020-01-01 RX ADMIN — ONDANSETRON 4 MG: 2 INJECTION INTRAMUSCULAR; INTRAVENOUS at 20:24

## 2020-01-01 RX ADMIN — ACETAMINOPHEN 1000 MG: 500 TABLET, FILM COATED ORAL at 13:41

## 2020-01-01 RX ADMIN — HYDROCODONE BITARTRATE AND ACETAMINOPHEN 1 TABLET: 5; 325 TABLET ORAL at 13:04

## 2020-01-01 RX ADMIN — ASPIRIN 325 MG: 325 TABLET, COATED ORAL at 08:45

## 2020-01-01 RX ADMIN — GLYCOPYRROLATE 0.4 MG: 0.2 INJECTION, SOLUTION INTRAMUSCULAR; INTRAVITREAL at 12:37

## 2020-01-01 RX ADMIN — LEVOFLOXACIN 500 MG: 500 TABLET, FILM COATED ORAL at 16:32

## 2020-01-01 RX ADMIN — VANCOMYCIN HYDROCHLORIDE 1250 MG: 10 INJECTION, POWDER, LYOPHILIZED, FOR SOLUTION INTRAVENOUS at 11:27

## 2020-01-01 RX ADMIN — SODIUM CHLORIDE 500 ML: 9 INJECTION, SOLUTION INTRAVENOUS at 06:43

## 2020-01-01 RX ADMIN — FENTANYL CITRATE 50 MCG: 50 INJECTION, SOLUTION INTRAMUSCULAR; INTRAVENOUS at 13:47

## 2020-01-01 RX ADMIN — GABAPENTIN 400 MG: 400 CAPSULE ORAL at 05:20

## 2020-01-01 RX ADMIN — ONDANSETRON HYDROCHLORIDE 4 MG: 2 SOLUTION INTRAMUSCULAR; INTRAVENOUS at 15:26

## 2020-01-01 RX ADMIN — PROPOFOL 100 MG: 10 INJECTION, EMULSION INTRAVENOUS at 14:26

## 2020-01-01 RX ADMIN — LEVOTHYROXINE SODIUM 112 MCG: 112 TABLET ORAL at 05:03

## 2020-01-01 RX ADMIN — ROPIVACAINE HYDROCHLORIDE 40 ML: 5 INJECTION, SOLUTION EPIDURAL; INFILTRATION; PERINEURAL at 14:08

## 2020-01-01 RX ADMIN — PHENYLEPHRINE HYDROCHLORIDE 100 MCG: 10 INJECTION INTRAVENOUS at 14:40

## 2020-01-01 RX ADMIN — GLYCOPYRROLATE 0.4 MG: 0.2 INJECTION, SOLUTION INTRAMUSCULAR; INTRAVITREAL at 14:29

## 2020-01-01 RX ADMIN — PANTOPRAZOLE SODIUM 40 MG: 40 TABLET, DELAYED RELEASE ORAL at 04:57

## 2020-01-01 RX ADMIN — MORPHINE SULFATE 2 MG: 2 INJECTION, SOLUTION INTRAMUSCULAR; INTRAVENOUS at 17:33

## 2020-01-01 RX ADMIN — PANTOPRAZOLE SODIUM 40 MG: 40 TABLET, DELAYED RELEASE ORAL at 05:33

## 2020-01-01 RX ADMIN — ONDANSETRON 4 MG: 2 INJECTION INTRAMUSCULAR; INTRAVENOUS at 05:00

## 2020-01-01 RX ADMIN — SODIUM CHLORIDE, POTASSIUM CHLORIDE, SODIUM LACTATE AND CALCIUM CHLORIDE 9 ML/HR: 600; 310; 30; 20 INJECTION, SOLUTION INTRAVENOUS at 14:41

## 2020-01-01 RX ADMIN — HYDROCODONE BITARTRATE AND ACETAMINOPHEN 1 TABLET: 5; 325 TABLET ORAL at 07:16

## 2020-01-01 RX ADMIN — GABAPENTIN 800 MG: 400 CAPSULE ORAL at 21:02

## 2020-01-01 RX ADMIN — SODIUM CHLORIDE 60 ML/HR: 9 INJECTION, SOLUTION INTRAVENOUS at 18:06

## 2020-01-01 RX ADMIN — DOCUSATE SODIUM 100 MG: 100 CAPSULE, LIQUID FILLED ORAL at 20:54

## 2020-01-01 RX ADMIN — HYDROMORPHONE HYDROCHLORIDE 0.25 MG: 2 INJECTION, SOLUTION INTRAMUSCULAR; INTRAVENOUS; SUBCUTANEOUS at 16:59

## 2020-01-01 RX ADMIN — GABAPENTIN 800 MG: 400 CAPSULE ORAL at 17:08

## 2020-01-01 RX ADMIN — LEVOTHYROXINE SODIUM 112 MCG: 112 TABLET ORAL at 05:04

## 2020-01-01 RX ADMIN — MORPHINE SULFATE 2 MG: 2 INJECTION, SOLUTION INTRAMUSCULAR; INTRAVENOUS at 10:04

## 2020-01-01 RX ADMIN — MORPHINE SULFATE 2 MG: 2 INJECTION, SOLUTION INTRAMUSCULAR; INTRAVENOUS at 08:28

## 2020-01-01 RX ADMIN — PANTOPRAZOLE SODIUM 40 MG: 40 TABLET, DELAYED RELEASE ORAL at 06:18

## 2020-01-01 RX ADMIN — SODIUM CHLORIDE 75 ML/HR: 9 INJECTION, SOLUTION INTRAVENOUS at 04:59

## 2020-01-01 RX ADMIN — GLYCOPYRROLATE 0.4 MG: 0.2 INJECTION, SOLUTION INTRAMUSCULAR; INTRAVITREAL at 16:57

## 2020-01-01 RX ADMIN — ENOXAPARIN SODIUM 30 MG: 30 INJECTION SUBCUTANEOUS at 22:41

## 2020-01-01 RX ADMIN — METOPROLOL SUCCINATE 25 MG: 25 TABLET, EXTENDED RELEASE ORAL at 08:17

## 2020-01-01 RX ADMIN — GABAPENTIN 100 MG: 100 CAPSULE ORAL at 21:41

## 2020-01-01 RX ADMIN — GABAPENTIN 400 MG: 400 CAPSULE ORAL at 21:46

## 2020-01-01 RX ADMIN — LORAZEPAM 0.5 MG: 2 INJECTION INTRAMUSCULAR; INTRAVENOUS at 08:53

## 2020-01-01 RX ADMIN — HYDROMORPHONE HYDROCHLORIDE 0.25 MG: 2 INJECTION, SOLUTION INTRAMUSCULAR; INTRAVENOUS; SUBCUTANEOUS at 16:58

## 2020-01-01 RX ADMIN — MORPHINE SULFATE 2 MG: 2 INJECTION, SOLUTION INTRAMUSCULAR; INTRAVENOUS at 22:49

## 2020-01-01 RX ADMIN — ASPIRIN 81 MG: 81 TABLET, CHEWABLE ORAL at 19:01

## 2020-01-01 RX ADMIN — SODIUM CHLORIDE, POTASSIUM CHLORIDE, SODIUM LACTATE AND CALCIUM CHLORIDE 9 ML/HR: 600; 310; 30; 20 INJECTION, SOLUTION INTRAVENOUS at 13:45

## 2020-01-01 RX ADMIN — PANTOPRAZOLE SODIUM 40 MG: 40 TABLET, DELAYED RELEASE ORAL at 06:09

## 2020-01-01 RX ADMIN — FAMOTIDINE 20 MG: 10 INJECTION INTRAVENOUS at 13:45

## 2020-01-01 RX ADMIN — ASPIRIN 325 MG: 325 TABLET, COATED ORAL at 09:18

## 2020-01-14 PROBLEM — T79.6XXA TRAUMATIC RHABDOMYOLYSIS (HCC): Status: ACTIVE | Noted: 2020-01-01

## 2020-01-14 NOTE — ED NOTES
Patient to er from home with c/o fall with injury to right lower leg. Reported patient might have been in the floor for aprox 4 hours. No loc reported, reported no blood thinners reported. Reported patient fell while getting out of chair.      Hua Chopra, RN  01/14/20 1402

## 2020-01-14 NOTE — H&P
"History and physical    Primary care physician  Dr. LOPEZ    Chief complaint  Right lower extremity pain    History of present illness  89-year-old white female with history of hypertension hypothyroidism and osteoarthritis who lives by herself brought to the emergency room by the family after the unwitnessed fall with back pain right hip pain and right ankle pain.  Patient denies any headache chest pain shortness of breath abdominal pain nausea vomiting diarrhea.  Patient fully alert oriented.  Patient work-up in ER revealed traumatic rhabdomyolysis and right ankle fracture admit for management.    PAST MEDICAL HISTORY   Hypertension  Hypothyroidism  Osteoarthritis      PAST SURGICAL HISTORY     No past surgical history on file.     FAMILY HISTORY  No family history on file.     SOCIAL HISTORY  Social History   Social History            Socioeconomic History   • Marital status:        Spouse name: Not on file   • Number of children: Not on file   • Years of education: Not on file   • Highest education level: Not on file         ALLERGIES  Penicillins  Home medications reviewed     REVIEW OF SYSTEMS  Constitutional: Negative for fever.   HENT: Negative for sore throat.    Eyes: Negative.    Respiratory: Negative for cough and shortness of breath.    Cardiovascular: Negative for chest pain.   Gastrointestinal: Negative for abdominal pain, diarrhea and vomiting.   Genitourinary: Negative for dysuria.   Musculoskeletal: Positive for arthralgias (RLE pain) and back pain. Negative for neck pain.   Skin: Negative for rash.   Allergic/Immunologic: Negative.    Neurological: Negative for weakness, numbness and headaches.   Hematological: Negative.    Psychiatric/Behavioral: Negative.    All other systems reviewed and are negative.     PHYSICAL EXAM  Blood pressure 119/55, pulse 88, temperature 99.4 °F (37.4 °C), temperature source Tympanic, resp. rate 16, height 162.6 cm (64\"), weight 68 kg (150 lb), SpO2 96 " %.    Constitutional: She is oriented to person, place, and time. No distress.   Head: Normocephalic and atraumatic.   Eyes: Pupils are equal, round, and reactive to light. EOM are normal.   Neck: Normal range of motion. Neck supple. No muscular tenderness present.   Cardiovascular: Normal rate, regular rhythm and normal heart sounds.   Pulmonary/Chest: Effort normal and breath sounds normal. No respiratory distress.   Abdominal: Soft. There is no tenderness. There is no rebound and no guarding.   Musculoskeletal: She exhibits no edema. Right hip: She exhibits tenderness. Right knee: She exhibits decreased range of motion (secondary to pain) and swelling. Tenderness found. Right ankle: She exhibits swelling. Tenderness. Lumbar back: She exhibits tenderness.   The R leg is shorted and externally rotated.    Neurological: She is alert and oriented to person, place, and time. She has normal sensation and normal strength.   Skin: Skin is warm and dry. Ecchymosis (R lower extremity diffusely) noted. No rash noted.   Psychiatric: Mood and affect normal.     LAB RESULTS  Lab Results (last 24 hours)     Procedure Component Value Units Date/Time    Troponin [[id number]]  (Normal) Collected:  01/14/20 1515    Specimen:  Blood Updated:  01/14/20 1605     Troponin T 0.023 ng/mL     Narrative:       Troponin T Reference Range:  <= 0.03 ng/mL-   Negative for AMI  >0.03 ng/mL-     Abnormal for myocardial necrosis.  Clinicians would have to utilize clinical acumen, EKG, Troponin and serial changes to determine if it is an Acute Myocardial Infarction or myocardial injury due to an underlying chronic condition.       Results may be falsely decreased if patient taking Biotin.      Myoglobin Screen, Urine - Urine, Clean Catch [[id number]]  (Abnormal) Collected:  01/14/20 1230    Specimen:  Urine, Clean Catch Updated:  01/14/20 1311     Myoglobin, Qualitative Positive    Narrative:       Due to the similarities in the chemical properties  of Hemoglobin and and Myoglobin, the presence of either will yield a positive Myoglobin Screen.    Troponin [[id number]]  (Abnormal) Collected:  01/14/20 1221    Specimen:  Blood Updated:  01/14/20 1310     Troponin T 0.031 ng/mL     Narrative:       Troponin T Reference Range:  <= 0.03 ng/mL-   Negative for AMI  >0.03 ng/mL-     Abnormal for myocardial necrosis.  Clinicians would have to utilize clinical acumen, EKG, Troponin and serial changes to determine if it is an Acute Myocardial Infarction or myocardial injury due to an underlying chronic condition.       Results may be falsely decreased if patient taking Biotin.      Comprehensive Metabolic Panel [[id number]]  (Abnormal) Collected:  01/14/20 1221    Specimen:  Blood Updated:  01/14/20 1306     Glucose 112 mg/dL      BUN 16 mg/dL      Creatinine 0.94 mg/dL      Sodium 139 mmol/L      Potassium 3.9 mmol/L      Chloride 100 mmol/L      CO2 27.8 mmol/L      Calcium 9.7 mg/dL      Total Protein 7.1 g/dL      Albumin 3.70 g/dL      ALT (SGPT) 16 U/L      AST (SGOT) 41 U/L      Alkaline Phosphatase 68 U/L      Total Bilirubin 1.4 mg/dL      eGFR Non African Amer 55 mL/min/1.73      Globulin 3.4 gm/dL      A/G Ratio 1.1 g/dL      BUN/Creatinine Ratio 17.0     Anion Gap 11.2 mmol/L     Narrative:       GFR Normal >60  Chronic Kidney Disease <60  Kidney Failure <15      CK [[id number]]  (Abnormal) Collected:  01/14/20 1221    Specimen:  Blood Updated:  01/14/20 1306     Creatine Kinase 1,063 U/L     Magnesium [[id number]]  (Normal) Collected:  01/14/20 1221    Specimen:  Blood Updated:  01/14/20 1306     Magnesium 1.8 mg/dL     CBC & Differential [[id number]] Collected:  01/14/20 1221    Specimen:  Blood Updated:  01/14/20 1242    Narrative:       The following orders were created for panel order CBC & Differential.  Procedure                               Abnormality         Status                     ---------                               -----------         ------                      CBC Auto Differential[[id number]]        Abnormal            Final result                 Please view results for these tests on the individual orders.    CBC Auto Differential [[id number]]  (Abnormal) Collected:  01/14/20 1221    Specimen:  Blood Updated:  01/14/20 1242     WBC 12.67 10*3/mm3      RBC 4.51 10*6/mm3      Hemoglobin 13.2 g/dL      Hematocrit 39.4 %      MCV 87.4 fL      MCH 29.3 pg      MCHC 33.5 g/dL      RDW 13.1 %      RDW-SD 42.1 fl      MPV 9.6 fL      Platelets 338 10*3/mm3      Neutrophil % 84.6 %      Lymphocyte % 8.4 %      Monocyte % 6.1 %      Eosinophil % 0.1 %      Basophil % 0.3 %      Immature Grans % 0.5 %      Neutrophils, Absolute 10.73 10*3/mm3      Lymphocytes, Absolute 1.06 10*3/mm3      Monocytes, Absolute 0.77 10*3/mm3      Eosinophils, Absolute 0.01 10*3/mm3      Basophils, Absolute 0.04 10*3/mm3      Immature Grans, Absolute 0.06 10*3/mm3      nRBC 0.0 /100 WBC         Imaging Results (Last 24 Hours)     Procedure Component Value Units Date/Time    CT Lower Extremity Right Without Contrast [[id number]] Collected:  01/14/20 1504     Updated:  01/14/20 1505    Narrative:       RIGHT KNEE CT WITHOUT CONTRAST     HISTORY: Knee pain after a fall. Ankle fractures.     TECHNIQUE: Axial CT of the right knee with multiplanar reformatted  images is provided. This is correlated with x-rays from earlier today. A  pelvis CT was also performed and is reported separately.     Radiation dose reduction techniques were utilized, including automated  exposure control and exposure modulation based on body size.     FINDINGS: There is advanced osteoarthritic change at the knee with  complete loss of articular cartilage in all 3 compartments. There is  bony remodeling of the articular surfaces along with large marginal  osteophytes. Several subcortical cysts are observed around the medial  compartment and at the lateral tibial plateau. Not unexpectedly, there  is a joint effusion and  a distended medial gastrocnemius/semimembranosus  bursa. There is no evidence of hemarthrosis nor is there any  intra-articular fat fluid level. Joint fluid measures 14 Hounsfield  units, consistent with simple, nonhemorrhagic fluid.     No fracture is identified around the knee. The bones are somewhat  demineralized. There is also some degenerative change at the proximal  tibiofibular joint.     Bulky osteophyte protrudes across the intercondylar notch between the  femoral condyles. No normal-appearing anterior cruciate ligament fibers  appear to be present. There is some calcification within the substance  of the posterior cruciate ligament which appears to be intact. There is  a 10 mm intra-articular body, calcified, along the lateral aspect of the  suprapatellar pouch.       Impression:       Advanced osteoarthritic change at the right knee. No  fracture or other acute appearing abnormality is identified.     I called the findings to Dr. Chandler in the emergency department at  around 2:30 PM.             CT Pelvis Without Contrast [[id number]] Collected:  01/14/20 1451     Updated:  01/14/20 1504    Narrative:       PELVIS CT WITHOUT CONTRAST     HISTORY: Right hip pain after a fall. Ankle fractures.     TECHNIQUE: Axial CT of the pelvis was performed without contrast and is  correlated with pelvis and right femur x-rays performed earlier this  morning. The MRI was performed today and is reported separately.  Radiation dose reduction techniques were utilized, including automated  exposure control and exposure modulation based on body size.     FINDINGS: There is sigmoid colon diverticulosis. No intrapelvic  hemorrhage or lesion is identified. Atheromatous vascular calcification  is observed.     Images through the lower lumbar region demonstrate 6 mm anterolisthesis  of L4 on L5 with severe spinal stenosis at that level. Vertebral height  at L5 is normal.     There is some degenerative change at the hips with  chondrocalcinosis.  There is no evidence of pelvis or proximal femur fracture. There is no  hip effusion. There are foci of ossification cephalad to the left  femoral greater trochanter, where there appear to be old tears or  avulsion fractures of the abductor tendons. There is some partial fatty  atrophy of the right gluteus minimus and medius muscles. Other muscles  and tendons around the right hip appear intact. Hamstring origins appear  normal. There is no soft tissue edema or lymphadenopathy.       Impression:       No acute abnormality identified at the pelvis or the right  hip. Specifically, there is no evidence of fracture.     Incidentally noted is degenerative change in the lumbar spine with  severe appearing spinal stenosis at L4-5. Colonic diverticulosis and a  small umbilical hernia containing a bundle of normal-appearing omental  fat are incidentally noted.     This report was finalized on 1/14/2020 3:01 PM by Dr. Js Stark M.D.       XR Spine Lumbar Complete 4+VW [[id number]] Collected:  01/14/20 1229     Updated:  01/14/20 1240    Narrative:       XR PELVIS 1 OR 2 VW-, XR SPINE LUMBAR COMPLETE 4+VW-, XR FEMUR 2 VW  RIGHT-, XR KNEE 1 OR 2 VW RIGHT-, XR ANKLE 3+ VW RIGHT-, XR FOOT 3+ VW  RIGHT-, XR CHEST 2 VW-     HISTORY: Female who is 89 years-old,  trauma     TECHNIQUE: Frontal view of the pelvis, 6 views of the lumbar spine, 4  views of the right femur, 3 views of the right knee, 3 views of the  right ankle, 4 views of the right foot, frontal and lateral views of the  chest     COMPARISON: None available     FINDINGS:     Chest x-ray: Heart size is borderline. Aorta is tortuous, calcified.  Pulmonary vasculature is unremarkable. No focal pulmonary consolidation,  pleural effusion, or pneumothorax. No acute osseous process.     Right foot and ankle: Obliquely oriented fracture at the distal fibular  metadiaphysis, with 3 mm cortical step-off. Transversely oriented  fracture at the medial  malleolus with 6 mm lateral displacement of  distal fracture fragment. Mild lateral subluxation of the talus in  relation to the tibia. Adjacent soft tissue swelling is present. No  other fractures are identified in the right foot or ankle. Moderate  calcaneal spurring. Hallux valgus deformity with bunion formation.  Osteoarthritic degenerative changes in the foot, subcortical cystic  change apparent in tarsals. Vascular and dystrophic soft tissue  calcifications are apparent.     Right knee, right femur, pelvis: Prominent tibiofemoral joint space  narrowing. Some depression of the medial tibial plateau and flattening  of the articular contour of the medial femoral condyle, probably  degenerative in nature, but the possibility of cortical impaction  fracture cannot be excluded if there is new pain at this location after  injury, correlate clinically, further evaluation with cross-sectional  imaging could be obtained as may be indicated. Moderate degenerative  spurring. Minimal to mild knee effusion. Mild to moderate bilateral hip  joint space narrowing. Degenerative changes are seen at the symphysis  pubis. Dystrophic soft tissue calcifications are apparent.     Lumbar spine: Mild anterolisthesis of L4 on L5, L5 on S1.  Age-indeterminate superior endplate depression at L2, correlate with  location of pain. Vertebral body heights otherwise appear preserved. No  other evidence of fracture is identified. Multilevel endplate spurring  and facet arthropathy. Disc space narrowing at L3/4, L5/S1. Arterial  calcifications are present. If further imaging evaluation lumbar spine  is indicated, MRI could be considered.       Impression:       1. Fractures of the distal right tibia and fibula. Probable chronic  remodeling of the medial tibial plateau and medial femoral condyle  versus age-indeterminate cortical impaction fractures, correlate with  location of pain, further evaluation can be obtained as may be  indicated.  Age-indeterminate superior endplate depression at L2,  correlate with location of pain. Degenerative changes.  2. No evidence for acute pulmonary process. Borderline heart size.  Tortuous aorta. Follow-up as clinically indicated.     This report was finalized on 1/14/2020 12:37 PM by Dr. Boyd Myers M.D.       XR Pelvis 1 or 2 View [[id number]] Collected:  01/14/20 1229     Updated:  01/14/20 1240    Narrative:       XR PELVIS 1 OR 2 VW-, XR SPINE LUMBAR COMPLETE 4+VW-, XR FEMUR 2 VW  RIGHT-, XR KNEE 1 OR 2 VW RIGHT-, XR ANKLE 3+ VW RIGHT-, XR FOOT 3+ VW  RIGHT-, XR CHEST 2 VW-     HISTORY: Female who is 89 years-old,  trauma     TECHNIQUE: Frontal view of the pelvis, 6 views of the lumbar spine, 4  views of the right femur, 3 views of the right knee, 3 views of the  right ankle, 4 views of the right foot, frontal and lateral views of the  chest     COMPARISON: None available     FINDINGS:     Chest x-ray: Heart size is borderline. Aorta is tortuous, calcified.  Pulmonary vasculature is unremarkable. No focal pulmonary consolidation,  pleural effusion, or pneumothorax. No acute osseous process.     Right foot and ankle: Obliquely oriented fracture at the distal fibular  metadiaphysis, with 3 mm cortical step-off. Transversely oriented  fracture at the medial malleolus with 6 mm lateral displacement of  distal fracture fragment. Mild lateral subluxation of the talus in  relation to the tibia. Adjacent soft tissue swelling is present. No  other fractures are identified in the right foot or ankle. Moderate  calcaneal spurring. Hallux valgus deformity with bunion formation.  Osteoarthritic degenerative changes in the foot, subcortical cystic  change apparent in tarsals. Vascular and dystrophic soft tissue  calcifications are apparent.     Right knee, right femur, pelvis: Prominent tibiofemoral joint space  narrowing. Some depression of the medial tibial plateau and flattening  of the articular contour of the  medial femoral condyle, probably  degenerative in nature, but the possibility of cortical impaction  fracture cannot be excluded if there is new pain at this location after  injury, correlate clinically, further evaluation with cross-sectional  imaging could be obtained as may be indicated. Moderate degenerative  spurring. Minimal to mild knee effusion. Mild to moderate bilateral hip  joint space narrowing. Degenerative changes are seen at the symphysis  pubis. Dystrophic soft tissue calcifications are apparent.     Lumbar spine: Mild anterolisthesis of L4 on L5, L5 on S1.  Age-indeterminate superior endplate depression at L2, correlate with  location of pain. Vertebral body heights otherwise appear preserved. No  other evidence of fracture is identified. Multilevel endplate spurring  and facet arthropathy. Disc space narrowing at L3/4, L5/S1. Arterial  calcifications are present. If further imaging evaluation lumbar spine  is indicated, MRI could be considered.       Impression:       1. Fractures of the distal right tibia and fibula. Probable chronic  remodeling of the medial tibial plateau and medial femoral condyle  versus age-indeterminate cortical impaction fractures, correlate with  location of pain, further evaluation can be obtained as may be  indicated. Age-indeterminate superior endplate depression at L2,  correlate with location of pain. Degenerative changes.  2. No evidence for acute pulmonary process. Borderline heart size.  Tortuous aorta. Follow-up as clinically indicated.     This report was finalized on 1/14/2020 12:37 PM by Dr. Boyd Myers M.D.       XR Femur 2 View Right [[id number]] Collected:  01/14/20 1229     Updated:  01/14/20 1240    Narrative:       XR PELVIS 1 OR 2 VW-, XR SPINE LUMBAR COMPLETE 4+VW-, XR FEMUR 2 VW  RIGHT-, XR KNEE 1 OR 2 VW RIGHT-, XR ANKLE 3+ VW RIGHT-, XR FOOT 3+ VW  RIGHT-, XR CHEST 2 VW-     HISTORY: Female who is 89 years-old,  trauma     TECHNIQUE: Frontal  view of the pelvis, 6 views of the lumbar spine, 4  views of the right femur, 3 views of the right knee, 3 views of the  right ankle, 4 views of the right foot, frontal and lateral views of the  chest     COMPARISON: None available     FINDINGS:     Chest x-ray: Heart size is borderline. Aorta is tortuous, calcified.  Pulmonary vasculature is unremarkable. No focal pulmonary consolidation,  pleural effusion, or pneumothorax. No acute osseous process.     Right foot and ankle: Obliquely oriented fracture at the distal fibular  metadiaphysis, with 3 mm cortical step-off. Transversely oriented  fracture at the medial malleolus with 6 mm lateral displacement of  distal fracture fragment. Mild lateral subluxation of the talus in  relation to the tibia. Adjacent soft tissue swelling is present. No  other fractures are identified in the right foot or ankle. Moderate  calcaneal spurring. Hallux valgus deformity with bunion formation.  Osteoarthritic degenerative changes in the foot, subcortical cystic  change apparent in tarsals. Vascular and dystrophic soft tissue  calcifications are apparent.     Right knee, right femur, pelvis: Prominent tibiofemoral joint space  narrowing. Some depression of the medial tibial plateau and flattening  of the articular contour of the medial femoral condyle, probably  degenerative in nature, but the possibility of cortical impaction  fracture cannot be excluded if there is new pain at this location after  injury, correlate clinically, further evaluation with cross-sectional  imaging could be obtained as may be indicated. Moderate degenerative  spurring. Minimal to mild knee effusion. Mild to moderate bilateral hip  joint space narrowing. Degenerative changes are seen at the symphysis  pubis. Dystrophic soft tissue calcifications are apparent.     Lumbar spine: Mild anterolisthesis of L4 on L5, L5 on S1.  Age-indeterminate superior endplate depression at L2, correlate with  location of pain.  Vertebral body heights otherwise appear preserved. No  other evidence of fracture is identified. Multilevel endplate spurring  and facet arthropathy. Disc space narrowing at L3/4, L5/S1. Arterial  calcifications are present. If further imaging evaluation lumbar spine  is indicated, MRI could be considered.       Impression:       1. Fractures of the distal right tibia and fibula. Probable chronic  remodeling of the medial tibial plateau and medial femoral condyle  versus age-indeterminate cortical impaction fractures, correlate with  location of pain, further evaluation can be obtained as may be  indicated. Age-indeterminate superior endplate depression at L2,  correlate with location of pain. Degenerative changes.  2. No evidence for acute pulmonary process. Borderline heart size.  Tortuous aorta. Follow-up as clinically indicated.     This report was finalized on 1/14/2020 12:37 PM by Dr. Boyd Myers M.D.       XR Knee 1 or 2 View Right [[id number]] Collected:  01/14/20 1229     Updated:  01/14/20 1240    Narrative:       XR PELVIS 1 OR 2 VW-, XR SPINE LUMBAR COMPLETE 4+VW-, XR FEMUR 2 VW  RIGHT-, XR KNEE 1 OR 2 VW RIGHT-, XR ANKLE 3+ VW RIGHT-, XR FOOT 3+ VW  RIGHT-, XR CHEST 2 VW-     HISTORY: Female who is 89 years-old,  trauma     TECHNIQUE: Frontal view of the pelvis, 6 views of the lumbar spine, 4  views of the right femur, 3 views of the right knee, 3 views of the  right ankle, 4 views of the right foot, frontal and lateral views of the  chest     COMPARISON: None available     FINDINGS:     Chest x-ray: Heart size is borderline. Aorta is tortuous, calcified.  Pulmonary vasculature is unremarkable. No focal pulmonary consolidation,  pleural effusion, or pneumothorax. No acute osseous process.     Right foot and ankle: Obliquely oriented fracture at the distal fibular  metadiaphysis, with 3 mm cortical step-off. Transversely oriented  fracture at the medial malleolus with 6 mm lateral displacement  of  distal fracture fragment. Mild lateral subluxation of the talus in  relation to the tibia. Adjacent soft tissue swelling is present. No  other fractures are identified in the right foot or ankle. Moderate  calcaneal spurring. Hallux valgus deformity with bunion formation.  Osteoarthritic degenerative changes in the foot, subcortical cystic  change apparent in tarsals. Vascular and dystrophic soft tissue  calcifications are apparent.     Right knee, right femur, pelvis: Prominent tibiofemoral joint space  narrowing. Some depression of the medial tibial plateau and flattening  of the articular contour of the medial femoral condyle, probably  degenerative in nature, but the possibility of cortical impaction  fracture cannot be excluded if there is new pain at this location after  injury, correlate clinically, further evaluation with cross-sectional  imaging could be obtained as may be indicated. Moderate degenerative  spurring. Minimal to mild knee effusion. Mild to moderate bilateral hip  joint space narrowing. Degenerative changes are seen at the symphysis  pubis. Dystrophic soft tissue calcifications are apparent.     Lumbar spine: Mild anterolisthesis of L4 on L5, L5 on S1.  Age-indeterminate superior endplate depression at L2, correlate with  location of pain. Vertebral body heights otherwise appear preserved. No  other evidence of fracture is identified. Multilevel endplate spurring  and facet arthropathy. Disc space narrowing at L3/4, L5/S1. Arterial  calcifications are present. If further imaging evaluation lumbar spine  is indicated, MRI could be considered.       Impression:       1. Fractures of the distal right tibia and fibula. Probable chronic  remodeling of the medial tibial plateau and medial femoral condyle  versus age-indeterminate cortical impaction fractures, correlate with  location of pain, further evaluation can be obtained as may be  indicated. Age-indeterminate superior endplate depression  at L2,  correlate with location of pain. Degenerative changes.  2. No evidence for acute pulmonary process. Borderline heart size.  Tortuous aorta. Follow-up as clinically indicated.     This report was finalized on 1/14/2020 12:37 PM by Dr. Boyd Myers M.D.       XR Ankle 3+ View Right [[id number]] Collected:  01/14/20 1229     Updated:  01/14/20 1240    Narrative:       XR PELVIS 1 OR 2 VW-, XR SPINE LUMBAR COMPLETE 4+VW-, XR FEMUR 2 VW  RIGHT-, XR KNEE 1 OR 2 VW RIGHT-, XR ANKLE 3+ VW RIGHT-, XR FOOT 3+ VW  RIGHT-, XR CHEST 2 VW-     HISTORY: Female who is 89 years-old,  trauma     TECHNIQUE: Frontal view of the pelvis, 6 views of the lumbar spine, 4  views of the right femur, 3 views of the right knee, 3 views of the  right ankle, 4 views of the right foot, frontal and lateral views of the  chest     COMPARISON: None available     FINDINGS:     Chest x-ray: Heart size is borderline. Aorta is tortuous, calcified.  Pulmonary vasculature is unremarkable. No focal pulmonary consolidation,  pleural effusion, or pneumothorax. No acute osseous process.     Right foot and ankle: Obliquely oriented fracture at the distal fibular  metadiaphysis, with 3 mm cortical step-off. Transversely oriented  fracture at the medial malleolus with 6 mm lateral displacement of  distal fracture fragment. Mild lateral subluxation of the talus in  relation to the tibia. Adjacent soft tissue swelling is present. No  other fractures are identified in the right foot or ankle. Moderate  calcaneal spurring. Hallux valgus deformity with bunion formation.  Osteoarthritic degenerative changes in the foot, subcortical cystic  change apparent in tarsals. Vascular and dystrophic soft tissue  calcifications are apparent.     Right knee, right femur, pelvis: Prominent tibiofemoral joint space  narrowing. Some depression of the medial tibial plateau and flattening  of the articular contour of the medial femoral condyle, probably  degenerative in  nature, but the possibility of cortical impaction  fracture cannot be excluded if there is new pain at this location after  injury, correlate clinically, further evaluation with cross-sectional  imaging could be obtained as may be indicated. Moderate degenerative  spurring. Minimal to mild knee effusion. Mild to moderate bilateral hip  joint space narrowing. Degenerative changes are seen at the symphysis  pubis. Dystrophic soft tissue calcifications are apparent.     Lumbar spine: Mild anterolisthesis of L4 on L5, L5 on S1.  Age-indeterminate superior endplate depression at L2, correlate with  location of pain. Vertebral body heights otherwise appear preserved. No  other evidence of fracture is identified. Multilevel endplate spurring  and facet arthropathy. Disc space narrowing at L3/4, L5/S1. Arterial  calcifications are present. If further imaging evaluation lumbar spine  is indicated, MRI could be considered.       Impression:       1. Fractures of the distal right tibia and fibula. Probable chronic  remodeling of the medial tibial plateau and medial femoral condyle  versus age-indeterminate cortical impaction fractures, correlate with  location of pain, further evaluation can be obtained as may be  indicated. Age-indeterminate superior endplate depression at L2,  correlate with location of pain. Degenerative changes.  2. No evidence for acute pulmonary process. Borderline heart size.  Tortuous aorta. Follow-up as clinically indicated.     This report was finalized on 1/14/2020 12:37 PM by Dr. Boyd Myers M.D.       XR Foot 3+ View Right [[id number]] Collected:  01/14/20 1229     Updated:  01/14/20 1240    Narrative:       XR PELVIS 1 OR 2 VW-, XR SPINE LUMBAR COMPLETE 4+VW-, XR FEMUR 2 VW  RIGHT-, XR KNEE 1 OR 2 VW RIGHT-, XR ANKLE 3+ VW RIGHT-, XR FOOT 3+ VW  RIGHT-, XR CHEST 2 VW-     HISTORY: Female who is 89 years-old,  trauma     TECHNIQUE: Frontal view of the pelvis, 6 views of the lumbar spine,  4  views of the right femur, 3 views of the right knee, 3 views of the  right ankle, 4 views of the right foot, frontal and lateral views of the  chest     COMPARISON: None available     FINDINGS:     Chest x-ray: Heart size is borderline. Aorta is tortuous, calcified.  Pulmonary vasculature is unremarkable. No focal pulmonary consolidation,  pleural effusion, or pneumothorax. No acute osseous process.     Right foot and ankle: Obliquely oriented fracture at the distal fibular  metadiaphysis, with 3 mm cortical step-off. Transversely oriented  fracture at the medial malleolus with 6 mm lateral displacement of  distal fracture fragment. Mild lateral subluxation of the talus in  relation to the tibia. Adjacent soft tissue swelling is present. No  other fractures are identified in the right foot or ankle. Moderate  calcaneal spurring. Hallux valgus deformity with bunion formation.  Osteoarthritic degenerative changes in the foot, subcortical cystic  change apparent in tarsals. Vascular and dystrophic soft tissue  calcifications are apparent.     Right knee, right femur, pelvis: Prominent tibiofemoral joint space  narrowing. Some depression of the medial tibial plateau and flattening  of the articular contour of the medial femoral condyle, probably  degenerative in nature, but the possibility of cortical impaction  fracture cannot be excluded if there is new pain at this location after  injury, correlate clinically, further evaluation with cross-sectional  imaging could be obtained as may be indicated. Moderate degenerative  spurring. Minimal to mild knee effusion. Mild to moderate bilateral hip  joint space narrowing. Degenerative changes are seen at the symphysis  pubis. Dystrophic soft tissue calcifications are apparent.     Lumbar spine: Mild anterolisthesis of L4 on L5, L5 on S1.  Age-indeterminate superior endplate depression at L2, correlate with  location of pain. Vertebral body heights otherwise appear  preserved. No  other evidence of fracture is identified. Multilevel endplate spurring  and facet arthropathy. Disc space narrowing at L3/4, L5/S1. Arterial  calcifications are present. If further imaging evaluation lumbar spine  is indicated, MRI could be considered.       Impression:       1. Fractures of the distal right tibia and fibula. Probable chronic  remodeling of the medial tibial plateau and medial femoral condyle  versus age-indeterminate cortical impaction fractures, correlate with  location of pain, further evaluation can be obtained as may be  indicated. Age-indeterminate superior endplate depression at L2,  correlate with location of pain. Degenerative changes.  2. No evidence for acute pulmonary process. Borderline heart size.  Tortuous aorta. Follow-up as clinically indicated.     This report was finalized on 1/14/2020 12:37 PM by Dr. Boyd Myers M.D.       XR Chest 2 View [[id number]] Collected:  01/14/20 1229     Updated:  01/14/20 1240    Narrative:       XR PELVIS 1 OR 2 VW-, XR SPINE LUMBAR COMPLETE 4+VW-, XR FEMUR 2 VW  RIGHT-, XR KNEE 1 OR 2 VW RIGHT-, XR ANKLE 3+ VW RIGHT-, XR FOOT 3+ VW  RIGHT-, XR CHEST 2 VW-     HISTORY: Female who is 89 years-old,  trauma     TECHNIQUE: Frontal view of the pelvis, 6 views of the lumbar spine, 4  views of the right femur, 3 views of the right knee, 3 views of the  right ankle, 4 views of the right foot, frontal and lateral views of the  chest     COMPARISON: None available     FINDINGS:     Chest x-ray: Heart size is borderline. Aorta is tortuous, calcified.  Pulmonary vasculature is unremarkable. No focal pulmonary consolidation,  pleural effusion, or pneumothorax. No acute osseous process.     Right foot and ankle: Obliquely oriented fracture at the distal fibular  metadiaphysis, with 3 mm cortical step-off. Transversely oriented  fracture at the medial malleolus with 6 mm lateral displacement of  distal fracture fragment. Mild lateral  subluxation of the talus in  relation to the tibia. Adjacent soft tissue swelling is present. No  other fractures are identified in the right foot or ankle. Moderate  calcaneal spurring. Hallux valgus deformity with bunion formation.  Osteoarthritic degenerative changes in the foot, subcortical cystic  change apparent in tarsals. Vascular and dystrophic soft tissue  calcifications are apparent.     Right knee, right femur, pelvis: Prominent tibiofemoral joint space  narrowing. Some depression of the medial tibial plateau and flattening  of the articular contour of the medial femoral condyle, probably  degenerative in nature, but the possibility of cortical impaction  fracture cannot be excluded if there is new pain at this location after  injury, correlate clinically, further evaluation with cross-sectional  imaging could be obtained as may be indicated. Moderate degenerative  spurring. Minimal to mild knee effusion. Mild to moderate bilateral hip  joint space narrowing. Degenerative changes are seen at the symphysis  pubis. Dystrophic soft tissue calcifications are apparent.     Lumbar spine: Mild anterolisthesis of L4 on L5, L5 on S1.  Age-indeterminate superior endplate depression at L2, correlate with  location of pain. Vertebral body heights otherwise appear preserved. No  other evidence of fracture is identified. Multilevel endplate spurring  and facet arthropathy. Disc space narrowing at L3/4, L5/S1. Arterial  calcifications are present. If further imaging evaluation lumbar spine  is indicated, MRI could be considered.       Impression:       1. Fractures of the distal right tibia and fibula. Probable chronic  remodeling of the medial tibial plateau and medial femoral condyle  versus age-indeterminate cortical impaction fractures, correlate with  location of pain, further evaluation can be obtained as may be  indicated. Age-indeterminate superior endplate depression at L2,  correlate with location of pain.  Degenerative changes.  2. No evidence for acute pulmonary process. Borderline heart size.  Tortuous aorta. Follow-up as clinically indicated.     This report was finalized on 1/14/2020 12:37 PM by Dr. Boyd Myers M.D.           EKG                              Rhythm/Rate: SR, 85  P waves and AR: normal  QRS, axis: LVH   ST and T waves: nonspecific ST changes       Current Facility-Administered Medications:   •  gabapentin (NEURONTIN) capsule 100 mg, 100 mg, Oral, Q8H, Jimbo Guzman MD  •  HYDROcodone-acetaminophen (NORCO) 5-325 MG per tablet 1 tablet, 1 tablet, Oral, Q6H PRN, Jimbo Guzman MD  •  [START ON 1/15/2020] levothyroxine (SYNTHROID, LEVOTHROID) tablet 112 mcg, 112 mcg, Oral, QAM AC, Jimbo Guzman MD  •  metoprolol succinate XL (TOPROL-XL) 24 hr tablet 25 mg, 25 mg, Oral, Daily, Jimbo Guzman MD  •  ondansetron (ZOFRAN) injection 4 mg, 4 mg, Intravenous, Q6H PRN, Jimbo Guzman MD  •  [COMPLETED] Insert peripheral IV, , , Once **AND** sodium chloride 0.9 % flush 10 mL, 10 mL, Intravenous, PRN, Charles Chandler MD  •  sodium chloride 0.9 % infusion, 125 mL/hr, Intravenous, Continuous, Charles Chandler MD, Last Rate: 125 mL/hr at 01/14/20 1230, 125 mL/hr at 01/14/20 1230    Current Outpatient Medications:   •  gabapentin (NEURONTIN) 800 MG tablet, Take 800 mg by mouth 3 (Three) Times a Day., Disp: , Rfl:   •  levothyroxine (SYNTHROID, LEVOTHROID) 112 MCG tablet, Take 112 mcg by mouth Every Morning Before Breakfast., Disp: , Rfl:   •  lisinopril (PRINIVIL,ZESTRIL) 40 MG tablet, Take 40 mg by mouth Daily., Disp: , Rfl:   •  metoprolol succinate XL (TOPROL-XL) 25 MG 24 hr tablet, Take 25 mg by mouth Daily., Disp: , Rfl:      ASSESSMENT  Acute traumatic rhabdomyolysis  Right distal tibia-fibula fracture  Hypertension  Hypothyroidism  Osteoarthritis  Gastroesophageal reflux disease    PLAN  Admit  IV fluid  Bedrest  Pain management  Orthopedic surgery consult  Adjust home medications  Stress ulcer DVT  prophylaxis  Supportive care  Discussed with family  DNR  Follow closely further recommendation current hospital course    LIZ DUNLAP MD

## 2020-01-14 NOTE — PROGRESS NOTES
Clinical Pharmacy Services: Medication History    Manuela Gonzalez is a 89 y.o. female presenting to [facility] for Traumatic rhabdomyolysis, initial encounter (CMS/[facility]) [T79.6XXA]    She  has no past medical history on file.    Allergies as of 01/14/2020 - Reviewed 01/14/2020   Allergen Reaction Noted   • Penicillins Angioedema 01/14/2020       Medication information was obtained from: Daughter at bedside and confirmed with patient's pharmacy  Pharmacy and Phone Number: [facility] - [address], [place] - [phone] Phone - [phone]    Prior to Admission Medications     Prescriptions Last Dose Informant Patient Reported? Taking?    gabapentin (NEURONTIN) 800 MG tablet 1/13/2020 Pharmacy Yes Yes    Take 800 mg by mouth 3 (Three) Times a Day.    levothyroxine (SYNTHROID, LEVOTHROID) 112 MCG tablet 1/13/2020 Pharmacy Yes Yes    Take 112 mcg by mouth Every Morning Before Breakfast.    lisinopril (PRINIVIL,ZESTRIL) 40 MG tablet 1/13/2020 Pharmacy Yes Yes    Take 40 mg by mouth Daily.    metoprolol succinate XL (TOPROL-XL) 25 MG 24 hr tablet 1/13/2020 Pharmacy Yes Yes    Take 25 mg by mouth Daily.            Medication notes:     Last time patient took her medications was on Monday 1/13/2020.     This medication list is complete to the best of my knowledge as of 1/14/2020    Please call if questions.    Henry Cosby, PharmD  1/14/2020 2:50 PM

## 2020-01-14 NOTE — ED PROVIDER NOTES
PROCEDURES  Splint - Cast - Strapping  Date/Time: 1/14/2020 12:47 PM  Performed by: Abdoulaye Hart PA  Authorized by: Charles Chandler MD     Consent:     Consent obtained:  Verbal    Consent given by:  Patient    Risks discussed:  Pain    Alternatives discussed:  No treatment  Pre-procedure details:     Sensation:  Normal    Skin color:  Normal   Procedure details:     Laterality:  Right    Location:  Ankle    Ankle:  R ankle    Strapping: no      Cast type:  Short leg    Splint type:  Ankle stirrup (posterior )    Supplies:  Elastic bandage, Ortho-Glass and cotton padding  Post-procedure details:     Pain:  Improved    Sensation:  Normal    Skin color:  Normal    Patient tolerance of procedure:  Tolerated well, no immediate complications        Documentation assistance provided by jr Mosher for Abdoulaye Hart PA-C.  Information recorded by the scribe was done at my direction and has been verified and validated by me.       Argentina Mosher  01/14/20 1253       Abdoulaye Hart PA  01/14/20 1938

## 2020-01-14 NOTE — ED PROVIDER NOTES
" EMERGENCY DEPARTMENT ENCOUNTER    CHIEF COMPLAINT  Chief Complaint: RLE pain  History given by: self, pt's family   History limited by: nothing  Room Number: 30/30  PMD: Val Crawford MD      HPI:  Pt is a 89 y.o. female who presents complaining of RLE pain that occurred after an unwitnessed fall last night. Pt also c/o back pain and R hip pain. Pt denies HA, CP, SOA, abd pain, urinary incontinence and LOC. Per pt's daughter, the pt lives at home alone where she had an unwitnessed fall last night, landing on the R side of her body. The pt states \"my R knee went out\" causing her to fall. She was found down this morning by her neighbor and family member who found after helping the pt up, she was unable to bear weight on the RLE. Due to pain, she was brought to the ED to r/o fx. Pt is not anticoagulated.    Duration:  Last night  Onset: sudden  Timing: constant  Location: R leg  Radiation: none stated  Quality: pain  Intensity/Severity: moderate  Progression: unchanged  Associated Symptoms: back pain and R hip pain  Aggravating Factors: none stated  Alleviating Factors: none stated  Previous Episodes: none stated  Treatment before arrival: none stated    PAST MEDICAL HISTORY  Active Ambulatory Problems     Diagnosis Date Noted   • No Active Ambulatory Problems     Resolved Ambulatory Problems     Diagnosis Date Noted   • No Resolved Ambulatory Problems     No Additional Past Medical History       PAST SURGICAL HISTORY  No past surgical history on file.    FAMILY HISTORY  No family history on file.    SOCIAL HISTORY  Social History     Socioeconomic History   • Marital status:      Spouse name: Not on file   • Number of children: Not on file   • Years of education: Not on file   • Highest education level: Not on file       ALLERGIES  Penicillins    REVIEW OF SYSTEMS  Review of Systems   Constitutional: Negative for fever.   HENT: Negative for sore throat.    Eyes: Negative.    Respiratory: Negative for " cough and shortness of breath.    Cardiovascular: Negative for chest pain.   Gastrointestinal: Negative for abdominal pain, diarrhea and vomiting.   Genitourinary: Negative for dysuria.   Musculoskeletal: Positive for arthralgias (RLE pain) and back pain. Negative for neck pain.   Skin: Negative for rash.   Allergic/Immunologic: Negative.    Neurological: Negative for weakness, numbness and headaches.   Hematological: Negative.    Psychiatric/Behavioral: Negative.    All other systems reviewed and are negative.      PHYSICAL EXAM  ED Triage Vitals   Temp Heart Rate Resp BP SpO2   01/14/20 1004 01/14/20 1004 01/14/20 1031 01/14/20 1004 01/14/20 1004   99.4 °F (37.4 °C) 88 18 113/62 98 %      Temp src Heart Rate Source Patient Position BP Location FiO2 (%)   01/14/20 1004 01/14/20 1116 01/14/20 1116 01/14/20 1116 --   Tympanic Monitor Lying Right arm        Physical Exam   Constitutional: She is oriented to person, place, and time. No distress.   HENT:   Head: Normocephalic and atraumatic.   No signs of trauma   Eyes: Pupils are equal, round, and reactive to light. EOM are normal.   Neck: Normal range of motion. Neck supple. No muscular tenderness present.   Cardiovascular: Normal rate, regular rhythm and normal heart sounds.   Pulmonary/Chest: Effort normal and breath sounds normal. No respiratory distress.   Abdominal: Soft. There is no tenderness. There is no rebound and no guarding.   Musculoskeletal: She exhibits no edema.        Right hip: She exhibits tenderness.        Right knee: She exhibits decreased range of motion (secondary to pain) and swelling. Tenderness found.        Right ankle: She exhibits swelling. Tenderness.        Lumbar back: She exhibits tenderness.   The R leg is shorted and externally rotated.    Neurological: She is alert and oriented to person, place, and time. She has normal sensation and normal strength.   Skin: Skin is warm and dry. Ecchymosis (R lower extremity diffusely) noted. No  rash noted.   There are pressure changes to the surrounding skin of the R ankle   Psychiatric: Mood and affect normal.   Nursing note and vitals reviewed.      LAB RESULTS  Lab Results (last 24 hours)     Procedure Component Value Units Date/Time    CBC & Differential [[id number]] Collected:  01/14/20 1221    Specimen:  Blood Updated:  01/14/20 1242    Narrative:       The following orders were created for panel order CBC & Differential.  Procedure                               Abnormality         Status                     ---------                               -----------         ------                     CBC Auto Differential[[id number]]        Abnormal            Final result                 Please view results for these tests on the individual orders.    Comprehensive Metabolic Panel [[id number]]  (Abnormal) Collected:  01/14/20 1221    Specimen:  Blood Updated:  01/14/20 1306     Glucose 112 mg/dL      BUN 16 mg/dL      Creatinine 0.94 mg/dL      Sodium 139 mmol/L      Potassium 3.9 mmol/L      Chloride 100 mmol/L      CO2 27.8 mmol/L      Calcium 9.7 mg/dL      Total Protein 7.1 g/dL      Albumin 3.70 g/dL      ALT (SGPT) 16 U/L      AST (SGOT) 41 U/L      Alkaline Phosphatase 68 U/L      Total Bilirubin 1.4 mg/dL      eGFR Non African Amer 55 mL/min/1.73      Globulin 3.4 gm/dL      A/G Ratio 1.1 g/dL      BUN/Creatinine Ratio 17.0     Anion Gap 11.2 mmol/L     Narrative:       GFR Normal >60  Chronic Kidney Disease <60  Kidney Failure <15      Troponin [[id number]]  (Abnormal) Collected:  01/14/20 1221    Specimen:  Blood Updated:  01/14/20 1310     Troponin T 0.031 ng/mL     Narrative:       Troponin T Reference Range:  <= 0.03 ng/mL-   Negative for AMI  >0.03 ng/mL-     Abnormal for myocardial necrosis.  Clinicians would have to utilize clinical acumen, EKG, Troponin and serial changes to determine if it is an Acute Myocardial Infarction or myocardial injury due to an underlying chronic condition.        Results may be falsely decreased if patient taking Biotin.      CK [[id number]]  (Abnormal) Collected:  01/14/20 1221    Specimen:  Blood Updated:  01/14/20 1306     Creatine Kinase 1,063 U/L     Magnesium [[id number]]  (Normal) Collected:  01/14/20 1221    Specimen:  Blood Updated:  01/14/20 1306     Magnesium 1.8 mg/dL     CBC Auto Differential [[id number]]  (Abnormal) Collected:  01/14/20 1221    Specimen:  Blood Updated:  01/14/20 1242     WBC 12.67 10*3/mm3      RBC 4.51 10*6/mm3      Hemoglobin 13.2 g/dL      Hematocrit 39.4 %      MCV 87.4 fL      MCH 29.3 pg      MCHC 33.5 g/dL      RDW 13.1 %      RDW-SD 42.1 fl      MPV 9.6 fL      Platelets 338 10*3/mm3      Neutrophil % 84.6 %      Lymphocyte % 8.4 %      Monocyte % 6.1 %      Eosinophil % 0.1 %      Basophil % 0.3 %      Immature Grans % 0.5 %      Neutrophils, Absolute 10.73 10*3/mm3      Lymphocytes, Absolute 1.06 10*3/mm3      Monocytes, Absolute 0.77 10*3/mm3      Eosinophils, Absolute 0.01 10*3/mm3      Basophils, Absolute 0.04 10*3/mm3      Immature Grans, Absolute 0.06 10*3/mm3      nRBC 0.0 /100 WBC     Myoglobin Screen, Urine - Urine, Clean Catch [[id number]]  (Abnormal) Collected:  01/14/20 1230    Specimen:  Urine, Clean Catch Updated:  01/14/20 1311     Myoglobin, Qualitative Positive    Narrative:       Due to the similarities in the chemical properties of Hemoglobin and and Myoglobin, the presence of either will yield a positive Myoglobin Screen.          I ordered the above labs and reviewed the results    RADIOLOGY  CT Pelvis Without Contrast   Final Result   No acute abnormality identified at the pelvis or the right   hip. Specifically, there is no evidence of fracture.       Incidentally noted is degenerative change in the lumbar spine with   severe appearing spinal stenosis at L4-5. Colonic diverticulosis and a   small umbilical hernia containing a bundle of normal-appearing omental   fat are incidentally noted.       This report  was finalized on 1/14/2020 3:01 PM by Dr. Js Stark M.D.          CT Lower Extremity Right Without Contrast   Preliminary Result   Advanced osteoarthritic change at the right knee. No   fracture or other acute appearing abnormality is identified.       I called the findings to Dr. Chandler in the emergency department at   around 2:30 PM.                  XR Ankle 3+ View Right   Final Result   1. Fractures of the distal right tibia and fibula. Probable chronic   remodeling of the medial tibial plateau and medial femoral condyle   versus age-indeterminate cortical impaction fractures, correlate with   location of pain, further evaluation can be obtained as may be   indicated. Age-indeterminate superior endplate depression at L2,   correlate with location of pain. Degenerative changes.   2. No evidence for acute pulmonary process. Borderline heart size.   Tortuous aorta. Follow-up as clinically indicated.       This report was finalized on 1/14/2020 12:37 PM by Dr. Boyd Myers M.D.          XR Spine Lumbar Complete 4+VW   Final Result   1. Fractures of the distal right tibia and fibula. Probable chronic   remodeling of the medial tibial plateau and medial femoral condyle   versus age-indeterminate cortical impaction fractures, correlate with   location of pain, further evaluation can be obtained as may be   indicated. Age-indeterminate superior endplate depression at L2,   correlate with location of pain. Degenerative changes.   2. No evidence for acute pulmonary process. Borderline heart size.   Tortuous aorta. Follow-up as clinically indicated.       This report was finalized on 1/14/2020 12:37 PM by Dr. Boyd Myers M.D.          XR Pelvis 1 or 2 View   Final Result   1. Fractures of the distal right tibia and fibula. Probable chronic   remodeling of the medial tibial plateau and medial femoral condyle   versus age-indeterminate cortical impaction fractures, correlate with   location of  pain, further evaluation can be obtained as may be   indicated. Age-indeterminate superior endplate depression at L2,   correlate with location of pain. Degenerative changes.   2. No evidence for acute pulmonary process. Borderline heart size.   Tortuous aorta. Follow-up as clinically indicated.       This report was finalized on 1/14/2020 12:37 PM by Dr. Boyd Myers M.D.          XR Knee 1 or 2 View Right   Final Result   1. Fractures of the distal right tibia and fibula. Probable chronic   remodeling of the medial tibial plateau and medial femoral condyle   versus age-indeterminate cortical impaction fractures, correlate with   location of pain, further evaluation can be obtained as may be   indicated. Age-indeterminate superior endplate depression at L2,   correlate with location of pain. Degenerative changes.   2. No evidence for acute pulmonary process. Borderline heart size.   Tortuous aorta. Follow-up as clinically indicated.       This report was finalized on 1/14/2020 12:37 PM by Dr. Boyd Myers M.D.          XR Foot 3+ View Right   Final Result   1. Fractures of the distal right tibia and fibula. Probable chronic   remodeling of the medial tibial plateau and medial femoral condyle   versus age-indeterminate cortical impaction fractures, correlate with   location of pain, further evaluation can be obtained as may be   indicated. Age-indeterminate superior endplate depression at L2,   correlate with location of pain. Degenerative changes.   2. No evidence for acute pulmonary process. Borderline heart size.   Tortuous aorta. Follow-up as clinically indicated.       This report was finalized on 1/14/2020 12:37 PM by Dr. Boyd Myers M.D.          XR Chest 2 View   Final Result   1. Fractures of the distal right tibia and fibula. Probable chronic   remodeling of the medial tibial plateau and medial femoral condyle   versus age-indeterminate cortical impaction fractures, correlate with    location of pain, further evaluation can be obtained as may be   indicated. Age-indeterminate superior endplate depression at L2,   correlate with location of pain. Degenerative changes.   2. No evidence for acute pulmonary process. Borderline heart size.   Tortuous aorta. Follow-up as clinically indicated.       This report was finalized on 1/14/2020 12:37 PM by Dr. Boyd Myers M.D.          XR Femur 2 View Right   Final Result   1. Fractures of the distal right tibia and fibula. Probable chronic   remodeling of the medial tibial plateau and medial femoral condyle   versus age-indeterminate cortical impaction fractures, correlate with   location of pain, further evaluation can be obtained as may be   indicated. Age-indeterminate superior endplate depression at L2,   correlate with location of pain. Degenerative changes.   2. No evidence for acute pulmonary process. Borderline heart size.   Tortuous aorta. Follow-up as clinically indicated.       This report was finalized on 1/14/2020 12:37 PM by Dr. Boyd Myers M.D.               I ordered the above noted radiological studies. Interpreted by radiologist. Reviewed by me in PACS.       PROCEDURES  EKG          EKG time: 1234  Rhythm/Rate: SR, 85  P waves and CT: normal  QRS, axis: LVH   ST and T waves: nonspecific ST changes     Interpreted Contemporaneously by me, independently viewed  No prior for comparison.       PROGRESS AND CONSULTS       1126 XR Right Knee ordered. XR Right Ankle ordered. XR Right foot ordered. CXR ordered. XR Spine Lumbar ordered. XR Pelvis ordered. XR Femur ordered. Troponin ordered. CK ordered. Magnesium ordered. CMP ordered. Myoglobin screen ordered. Labs ordered.    1242 Patient is resting comfortably and in NAD. Patient is stable. BP- 104/87 HR- 83 Temp- 99.4 °F (37.4 °C) (Tympanic) O2 sat- 98%. Informed the patient results of XR show a bimalleolar fx of the RLE. Discussed the plan for admission and splint placement.  Pt understands and agrees with the plan, all questions answered.    1245 Zofran ordered. Morphine 2 mg ordered.    1256 CT Lower extremity right ordered. CT Pelvis without contrast ordered.     1332 Call made to Ortho. Call made to [facility].    1353 Discussed pt w/Dr. Guzman ([facility]) who agrees to admit pt to telemetry.     1435 Discussed pt w/Dr. Stark (Radiology) regarding CT Pelvis and CT Lower extremity which is negative for fx.    1436 Patient is resting comfortably and in NAD. Patient is stable. BP- 150/73 HR- 78 Temp- 99.4 °F (37.4 °C) (Tympanic) O2 sat- 97%. Informed the patient results of CT Lower extremity and pelvis are negative for fx. Plan is to admit. Pt understands and agrees with the plan, all questions answered.    1500 Discussed pt w/Dr. Louise (Ortho) who agrees to consult pt.     MEDICAL DECISION MAKING  Results were reviewed/discussed with the patient and they were also made aware of online access. Pt also made aware that some labs, such as cultures, will not be resulted during ER visit and follow up with PMD is necessary.     MDM  Number of Diagnoses or Management Options  Closed bimalleolar fracture of right ankle, initial encounter:   Fall, initial encounter:   Traumatic rhabdomyolysis, initial encounter (CMS/[facility], located within [facility]):      Amount and/or Complexity of Data Reviewed  Clinical lab tests: ordered and reviewed (Myoglobin : positive  Troponin 0.031  CK 1,063  WBC 12.67)  Tests in the radiology section of CPT®: ordered and reviewed (CT Lower Extremity Right  CT Pelvis  XR Right Knee  XR Right Ankle  XR Right Foot  CXR   XR Spine Femur)  Tests in the medicine section of CPT®: ordered and reviewed (See EKG procedure notes)  Discussion of test results with the performing providers: yes (Dr. Stark (Radiology))  Decide to obtain previous medical records or to obtain history from someone other than the patient: yes  Obtain history from someone other than the patient: yes (Pt's family)  Discuss the  patient with other providers: yes (Dr. Guzman ([facility])  Dr. Louise (Ortho))  Independent visualization of images, tracings, or specimens: yes           DIAGNOSIS  Final diagnoses:   Traumatic rhabdomyolysis, initial encounter (CMS/[facility])   Fall, initial encounter   Closed bimalleolar fracture of right ankle, initial encounter       DISPOSITION  ADMISSION    Discussed treatment plan and reason for admission with pt/family and admitting physician.  Pt/family voiced understanding of the plan for admission for further testing/treatment as needed.       Latest Documented Vital Signs:  As of 1:42 PM  BP- 150/73 HR- 78 Temp- 99.4 °F (37.4 °C) (Tympanic) O2 sat- 97%    --  Documentation assistance provided by jr Ferrer for Dr. Charles Chandler.  Information recorded by the scribe was done at my direction and has been verified and validated by me.       Nabil Quezada  01/14/20 9601       Charles Chandler MD  01/14/20 9418

## 2020-01-15 PROBLEM — S82.841A CLOSED BIMALLEOLAR FRACTURE OF RIGHT ANKLE: Status: ACTIVE | Noted: 2020-01-01

## 2020-01-15 NOTE — PLAN OF CARE
Problem: Patient Care Overview  Goal: Plan of Care Review  Outcome: Ongoing (interventions implemented as appropriate)  Flowsheets (Taken 1/15/2020 0328)  Progress: no change  Plan of Care Reviewed With: patient  Outcome Summary: Pt A & O. Soft cast to RLE. Treated for pain with PRN pain meds x1. Purewick in place. Medicated per orders. IVF cont. No s/s of distress at this time, VSS, will cont to monitor.     Problem: Fall Risk (Adult)  Goal: Identify Related Risk Factors and Signs and Symptoms  Outcome: Ongoing (interventions implemented as appropriate)  Flowsheets (Taken 1/15/2020 0328)  Related Risk Factors (Fall Risk): gait/mobility problems; history of falls; inadequate lighting; slippery/uneven surfaces  Signs and Symptoms (Fall Risk): presence of risk factors     Problem: Fall Risk (Adult)  Goal: Absence of Fall  Outcome: Ongoing (interventions implemented as appropriate)  Flowsheets (Taken 1/15/2020 0328)  Absence of Fall: making progress toward outcome     Problem: Skin Injury Risk (Adult)  Goal: Identify Related Risk Factors and Signs and Symptoms  Outcome: Ongoing (interventions implemented as appropriate)  Flowsheets (Taken 1/15/2020 0328)  Related Risk Factors (Skin Injury Risk): advanced age; skeletal deformities; mobility impaired     Problem: Skin Injury Risk (Adult)  Goal: Skin Health and Integrity  Outcome: Ongoing (interventions implemented as appropriate)  Flowsheets (Taken 1/15/2020 0328)  Skin Health and Integrity: making progress toward outcome

## 2020-01-15 NOTE — PROGRESS NOTES
Continued Stay Note  [facility]     Patient Name: Manuela Gonzalez  MRN: [id number]  Today's Date: 1/15/2020    Admit Date: 1/14/2020    Discharge Plan     Row Name 01/15/20 0927       Plan    Plan  Plan [facility] for skilled care- pre cert needed.   KELI Fischer RN    Provided post acute provider list?  Yes    Post Acute Provider List  Nursing Home    Post Acute Provider Quality & Resource List  Yes    Delivered To  Patient;Support Person    Method of Delivery  In person    Patient/Family in Agreement with Plan  yes    Plan Comments   FACE SHEET VERIFIED/ IM LETTER SIGNED.  Spoke to pt and pt's daughter  (Seda Payne [phone]) at bedside.   Pt's PCP is Dr. Val Crawford.   Pt lives alone in a single story house.   Pt was independent with ADL's.   Pt has a grab bar, raised toilet seat and rolling walker for home use.   Pt gets prescriptions from [facility].    Pt's daughter denies any issues affording medications.  Pt is not current with HH.  Pt has not been in SNF.   Pt's daughter provided a [facility], SNF list, and CMS compare information.  Daughter requested referral to [facility].   Odette  ( [phone]) called to follow for [facility].   Plan [facility] for skilled car- pre cert needed.   KELI Fischer, SELENA        Discharge Codes    No documentation.             Anny Fischer, RN

## 2020-01-15 NOTE — PROGRESS NOTES
Discharge Planning Assessment  [facility]     Patient Name: Manuela Gonzalez  MRN: [id number]  Today's Date: 1/15/2020    Admit Date: 1/14/2020    Discharge Needs Assessment     Row Name 01/15/20 0924       Living Environment    Lives With  alone    Current Living Arrangements  home/apartment/condo    Primary Care Provided by  self    Provides Primary Care For  no one    Family Caregiver if Needed  child(daria), adult    Family Caregiver Names  Daughter  ( Seda Payne  [phone])    Quality of Family Relationships  involved;supportive    Able to Return to Prior Arrangements  yes    Living Arrangement Comments  Pt lives alone in single story house.       Resource/Environmental Concerns    Resource/Environmental Concerns  none    Transportation Concerns  car, none       Transition Planning    Patient/Family Anticipates Transition to  inpatient rehabilitation facility    Patient/Family Anticipated Services at Transition  none    Transportation Anticipated  family or friend will provide       Discharge Needs Assessment    Readmission Within the Last 30 Days  no previous admission in last 30 days    Concerns to be Addressed  denies needs/concerns at this time    Equipment Currently Used at Home  grab bar;walker, rolling    Anticipated Changes Related to Illness  none    Equipment Needed After Discharge  grab bar, toilet;raised toilet;walker, rolling    Provided post acute provider list?  Yes        Discharge Plan     Row Name 01/15/20 0927       Plan    Plan  Plan [facility] for skilled care- pre cert needed.   KELI Fischer RN    Provided post acute provider list?  Yes    Post Acute Provider List  Nursing Home    Post Acute Provider Quality & Resource List  Yes    Delivered To  Patient;Support Person    Method of Delivery  In person    Patient/Family in Agreement with Plan  yes    Plan Comments  FACE SHEET VERIFIED/ IM LETTER SIGNED.  Spoke to pt and pt's daughter  (Seda Payne [phone]) at  bedside.   Pt's PCP is Dr. Val Crawford.   Pt lives alone in a single story house.   Pt was independent with ADL's.   Pt has a grab bar, raised toilet seat and rolling walker for home use.   Pt gets prescriptions from [facility].    Pt's daughter denies any issues affording medications.  Pt is not current with HH.  Pt has not been in SNF.   Pt's daughter provided a Road to Recovery, SNF list, and CMS compare information.  Daughter requested referral to [facility].   Odette  ( [phone]) called to follow for [facility].   Plan [facility] for skilled car- pre cert needed.   KELI Fischer RN        Destination      Service Provider Request Status Selected Services Address Phone Number Fax Number    [facility] Pending - Request Sent N/A [address] [address] [phone]      Durable Medical Equipment      Coordination has not been started for this encounter.      Dialysis/Infusion      Coordination has not been started for this encounter.      Home Medical Care      Coordination has not been started for this encounter.      Therapy      Coordination has not been started for this encounter.      Community Resources      Coordination has not been started for this encounter.          Demographic Summary     Row Name 01/15/20 0923       General Information    Admission Type  inpatient    Arrived From  emergency department    Required Notices Provided  Important Message from Medicare    Reason for Consult  discharge planning    Preferred Language  English     Used During This Interaction  no       Contact Information    Permission Granted to Share Info With  family/designee Father  ( Seda Payne  [phone])        Functional Status     Row Name 01/15/20 0924       Functional Status    Usual Activity Tolerance  good    Current Activity Tolerance  good       Functional Status, IADL    Medications  independent    Meal Preparation   independent    Housekeeping  assistive person    Laundry  assistive person    Shopping  assistive person       Mental Status    General Appearance WDL  WDL       Mental Status Summary    Recent Changes in Mental Status/Cognitive Functioning  no changes        Psychosocial    No documentation.       Abuse/Neglect    No documentation.       Legal    No documentation.       Substance Abuse    No documentation.       Patient Forms    No documentation.           Anny Fischer RN

## 2020-01-15 NOTE — CONSULTS
Ortho (on-call MD unless specified)  Consult performed by: Js Montalvo Jr., MD  Consult ordered by: Charles Chandler MD          Patient Care Team:  Val Crawford MD as PCP - General (Internal Medicine)    Chief complaint: Right ankle pain    Subjective     History of Present Illness    The patient is a pleasant 89-year-old female with history of hypertension, hypothyroidism, arthritis who presented to the emergency department yesterday following a mechanical fall at home.  She normally lives independently by herself.  She normally is a community ambulator using a walker for assistance.    In the emergency room, radiographs showed a mildly displaced bimalleolar ankle fracture.  This was closed per emergency room physicians.  She was placed in a splint.  She was admitted to the medicine service for stabilization as she was also found to have acute rhabdomyolysis.    She also had some knee pain.  Radiographs and CT showed severe arthritic change but no acute fracture or injury.    My partner Dr. Louise was on-call for orthopedic trauma.  He requested my assistance in managing this ankle fracture.    The patient localizes pain in the right ankle.  Pain can be a 5 out of 10.  It is sharp and aching.  It is worse with activity and better with rest.    Review of Systems   Review of Systems:  Constitutional: Negative  HENT: Negative  Eyes: Negative  Respiratory: Negative; no shortness of breath  Cardiovascular: Negative; no current chest pain  Gastrointestinal: Negative  : Negative  Skin: Negative except as listed in HPI  Neurological: Negative, no numbness or deficits  Hematological: Negative  Muscoloskeletal: Per HPI                     Past Medical History:   Diagnosis Date   • Macular degeneration    ,   Past Surgical History:   Procedure Laterality Date   • APPENDECTOMY     , History reviewed. No pertinent family history.,   Social History     Tobacco Use   • Smoking status: Never Smoker   • Smokeless  tobacco: Never Used   Substance Use Topics   • Alcohol use: Not Currently   • Drug use: Never   ,   Medications Prior to Admission   Medication Sig Dispense Refill Last Dose   • gabapentin (NEURONTIN) 800 MG tablet Take 800 mg by mouth 3 (Three) Times a Day.   1/13/2020 at Unknown time   • levothyroxine (SYNTHROID, LEVOTHROID) 112 MCG tablet Take 112 mcg by mouth Every Morning Before Breakfast.   1/13/2020 at Unknown time   • lisinopril (PRINIVIL,ZESTRIL) 40 MG tablet Take 40 mg by mouth Daily.   1/13/2020 at Unknown time   • metoprolol succinate XL (TOPROL-XL) 25 MG 24 hr tablet Take 25 mg by mouth Daily.   1/13/2020 at Unknown time   , Scheduled Meds:    aspirin 81 mg Oral Daily   gabapentin 100 mg Oral Q8H   levothyroxine 112 mcg Oral QAM AC   metoprolol succinate XL 25 mg Oral Daily   pantoprazole 40 mg Oral Q AM   , Continuous Infusions:    sodium chloride 0.9 % with KCl 20 mEq 50 mL/hr Last Rate: 50 mL/hr (01/15/20 1537)   , PRN Meds:  HYDROcodone-acetaminophen  •  ondansetron  •  [COMPLETED] Insert peripheral IV **AND** sodium chloride and Allergies:  Penicillins    Objective      Vital Signs  Temp:  [98.1 °F (36.7 °C)-98.7 °F (37.1 °C)] 98.1 °F (36.7 °C)  Heart Rate:  [67-84] 71  Resp:  [16-18] 18  BP: (102-122)/(39-55) 111/39    Physical Exam  Physical Exam:  Vital signs reviewed.  Constitutional:  Appears frail  HEENT:  Head: normocephalic, atraumatic  Eyes: EOMI, grossly normal.  No scleral icterus.  Neck: Normal range of motion.  Supple, no tracheal deviation.  Cardiovascular: Regular rate.  Pulmonary: Effort normal, symmetric chest expansion, no labored breathing.  Abdominal: Soft, non distended  Neurological: No gross deficits, oriented to person, place, and time.  Skin: Warm and dry  Psychiatric: Normal mood and affect  Musculoskeletal:    Examination of her right lower extremity shows a well-padded short leg splint in place, clean and dry.  Toes are warm and well-perfused with brisk capillary refill.   No pain with passive stretch.  Sensation intact light touch over toes.  5 out of 5 EHL/FHL strength.      Results Review:   Radiographs of the right ankle are independently reviewed.  These show a mildly displaced bimalleolar ankle fracture.        Assessment/Plan       Closed bimalleolar fracture of right ankle    Traumatic rhabdomyolysis (CMS/HCC)      Assessment & Plan    This is a pleasant 89-year-old female with hypothyroidism, hyperlipidemia presenting with a closed displaced right bimalleolar ankle fracture following a mechanical fall.    I have recommended operative stabilization of her unstable ankle injury.    The risks/benefits of surgery, including pain, infection, wound healing problems, need for future procedures, mal/nonunion, DVT/PE, cardiac event, and/or death were discussed, and the patient elected to proceed with surgery.    -We will plan for surgery on Friday pending medical clearance given her rhabdomyolysis  -PT: Nonweightbearing, right lower extremity  -Pain control  -Disposition: Pending    Thank you for this consultation, please call with questions    Js Montalvo Jr, MD  01/15/20  4:54 PM

## 2020-01-15 NOTE — PROGRESS NOTES
"Daily progress note    Chief complaint  Doing little better  Still with pain in the right lower extremity  No new complaints f  Family at bedside    History of present illness  89-year-old white female with history of hypertension hypothyroidism and osteoarthritis who lives by herself brought to the emergency room by the family after the unwitnessed fall with back pain right hip pain and right ankle pain.  Patient denies any headache chest pain shortness of breath abdominal pain nausea vomiting diarrhea.  Patient fully alert oriented.  Patient work-up in ER revealed traumatic rhabdomyolysis and right ankle fracture admit for management.     REVIEW OF SYSTEMS  Constitutional: Negative for fever.   HENT: Negative for sore throat.    Eyes: Negative.    Respiratory: Negative for cough and shortness of breath.    Cardiovascular: Negative for chest pain.   Gastrointestinal: Negative for abdominal pain, diarrhea and vomiting.   Genitourinary: Negative for dysuria.   Musculoskeletal: Positive for arthralgias (RLE pain) and back pain. Negative for neck pain.   Skin: Negative for rash.   Allergic/Immunologic: Negative.    Neurological: Negative for weakness, numbness and headaches.   Hematological: Negative.    Psychiatric/Behavioral: Negative.    All other systems reviewed and are negative.     PHYSICAL EXAM         Blood pressure 113/49, pulse 73, temperature 98.4 °F (36.9 °C), temperature source Oral, resp. rate 16, height 162.6 cm (64\"), weight 69.9 kg (154 lb 1.6 oz), SpO2 94 %.    Constitutional: She is oriented to person, place, and time. No distress.   Head: Normocephalic and atraumatic.   Eyes: Pupils are equal, round, and reactive to light. EOM are normal.   Neck: Normal range of motion. Neck supple. No muscular tenderness present.   Cardiovascular: Normal rate, regular rhythm and normal heart sounds.   Pulmonary/Chest: Effort normal and breath sounds normal. No respiratory distress.   Abdominal: Soft. There is no " tenderness. There is no rebound and no guarding.   Musculoskeletal: She exhibits no edema. Right hip: She exhibits tenderness. Right knee: She exhibits decreased range of motion (secondary to pain) and swelling. Tenderness found. Right ankle: She exhibits swelling. Tenderness. Lumbar back: She exhibits tenderness.   The R leg is shorted and externally rotated.    Neurological: She is alert and oriented to person, place, and time. She has normal sensation and normal strength.   Skin: Skin is warm and dry. Ecchymosis (R lower extremity diffusely) noted. No rash noted.   Psychiatric: Mood and affect normal.     LAB RESULTS  Lab Results (last 24 hours)     Procedure Component Value Units Date/Time    TSH [[id number]]  (Normal) Collected:  01/15/20 0542    Specimen:  Blood Updated:  01/15/20 0842     TSH 0.534 uIU/mL     BNP [[id number]]  (Abnormal) Collected:  01/15/20 0542    Specimen:  Blood Updated:  01/15/20 0841     proBNP 5,905.0 pg/mL     Narrative:       Among patients with dyspnea, NT-proBNP is highly sensitive for the detection of acute congestive heart failure. In addition NT-proBNP of <300 pg/ml effectively rules out acute congestive heart failure with 99% negative predictive value.    Results may be falsely decreased if patient taking Biotin.      CBC & Differential [[id number]] Collected:  01/15/20 0542    Specimen:  Blood Updated:  01/15/20 0715    Narrative:       The following orders were created for panel order CBC & Differential.  Procedure                               Abnormality         Status                     ---------                               -----------         ------                     CBC Auto Differential[[id number]]        Abnormal            Final result                 Please view results for these tests on the individual orders.    CBC Auto Differential [[id number]]  (Abnormal) Collected:  01/15/20 0542    Specimen:  Blood Updated:  01/15/20 0715     WBC 11.83 10*3/mm3      RBC 3.72  10*6/mm3      Hemoglobin 11.0 g/dL      Hematocrit 32.0 %      MCV 86.0 fL      MCH 29.6 pg      MCHC 34.4 g/dL      RDW 13.2 %      RDW-SD 41.0 fl      MPV 9.8 fL      Platelets 263 10*3/mm3      Neutrophil % 81.3 %      Lymphocyte % 9.7 %      Monocyte % 6.4 %      Eosinophil % 1.3 %      Basophil % 0.5 %      Immature Grans % 0.8 %      Neutrophils, Absolute 9.61 10*3/mm3      Lymphocytes, Absolute 1.15 10*3/mm3      Monocytes, Absolute 0.76 10*3/mm3      Eosinophils, Absolute 0.15 10*3/mm3      Basophils, Absolute 0.06 10*3/mm3      Immature Grans, Absolute 0.10 10*3/mm3      nRBC 0.0 /100 WBC     Hemoglobin A1c [[id number]]  (Normal) Collected:  01/15/20 0542    Specimen:  Blood Updated:  01/15/20 0701     Hemoglobin A1C 5.00 %     Narrative:       Hemoglobin A1C Ranges:    Increased Risk for Diabetes  5.7% to 6.4%  Diabetes                     >= 6.5%  Diabetic Goal                < 7.0%    CK [[id number]]  (Abnormal) Collected:  01/15/20 0542    Specimen:  Blood Updated:  01/15/20 0646     Creatine Kinase 319 U/L     Comprehensive Metabolic Panel [[id number]]  (Abnormal) Collected:  01/15/20 0542    Specimen:  Blood Updated:  01/15/20 0643     Glucose 97 mg/dL      BUN 21 mg/dL      Creatinine 1.10 mg/dL      Sodium 140 mmol/L      Potassium 4.6 mmol/L      Chloride 104 mmol/L      CO2 27.1 mmol/L      Calcium 8.6 mg/dL      Total Protein 5.6 g/dL      Albumin 2.50 g/dL      ALT (SGPT) 15 U/L      AST (SGOT) 23 U/L      Alkaline Phosphatase 54 U/L      Total Bilirubin 0.7 mg/dL      eGFR Non African Amer 46 mL/min/1.73      Globulin 3.1 gm/dL      A/G Ratio 0.8 g/dL      BUN/Creatinine Ratio 19.1     Anion Gap 8.9 mmol/L     Narrative:       GFR Normal >60  Chronic Kidney Disease <60  Kidney Failure <15      Lipid Panel [[id number]]  (Abnormal) Collected:  01/15/20 0542    Specimen:  Blood Updated:  01/15/20 0643     Total Cholesterol 116 mg/dL      Triglycerides 58 mg/dL      HDL Cholesterol 66 mg/dL      LDL  Cholesterol  38 mg/dL      VLDL Cholesterol 11.6 mg/dL      LDL/HDL Ratio 0.58    Narrative:       Cholesterol Reference Ranges  (U.S. Department of Health and Human Services ATP III Classifications)    Desirable          <200 mg/dL  Borderline High    200-239 mg/dL  High Risk          >240 mg/dL      Triglyceride Reference Ranges  (U.S. Department of Health and Human Services ATP III Classifications)    Normal           <150 mg/dL  Borderline High  150-199 mg/dL  High             200-499 mg/dL  Very High        >500 mg/dL    HDL Reference Ranges  (U.S. Department of Health and Human Services ATP III Classifcations)    Low     <40 mg/dl (major risk factor for CHD)  High    >60 mg/dl ('negative' risk factor for CHD)        LDL Reference Ranges  (U.S. Department of Health and Human Services ATP III Classifcations)    Optimal          <100 mg/dL  Near Optimal     100-129 mg/dL  Borderline High  130-159 mg/dL  High             160-189 mg/dL  Very High        >189 mg/dL    Troponin [[id number]]  (Normal) Collected:  01/14/20 1515    Specimen:  Blood Updated:  01/14/20 1605     Troponin T 0.023 ng/mL     Narrative:       Troponin T Reference Range:  <= 0.03 ng/mL-   Negative for AMI  >0.03 ng/mL-     Abnormal for myocardial necrosis.  Clinicians would have to utilize clinical acumen, EKG, Troponin and serial changes to determine if it is an Acute Myocardial Infarction or myocardial injury due to an underlying chronic condition.       Results may be falsely decreased if patient taking Biotin.          Imaging Results (Last 24 Hours)     Procedure Component Value Units Date/Time    CT Lower Extremity Right Without Contrast [[id number]] Collected:  01/14/20 1504     Updated:  01/14/20 1718    Narrative:       RIGHT KNEE CT WITHOUT CONTRAST     HISTORY: Knee pain after a fall. Ankle fractures.     TECHNIQUE: Axial CT of the right knee with multiplanar reformatted  images is provided. This is correlated with x-rays from earlier  today. A  pelvis CT was also performed and is reported separately.     Radiation dose reduction techniques were utilized, including automated  exposure control and exposure modulation based on body size.     FINDINGS: There is advanced osteoarthritic change at the knee with  complete loss of articular cartilage in all 3 compartments. There is  bony remodeling of the articular surfaces along with large marginal  osteophytes. Several subcortical cysts are observed around the medial  compartment and at the lateral tibial plateau. Not unexpectedly, there  is a joint effusion and a distended medial gastrocnemius/semimembranosus  bursa. There is no evidence of hemarthrosis nor is there any  intra-articular fat fluid level. Joint fluid measures 14 Hounsfield  units, consistent with simple, nonhemorrhagic fluid.     No fracture is identified around the knee. The bones are somewhat  demineralized. There is also some degenerative change at the proximal  tibiofibular joint.     Bulky osteophyte protrudes across the intercondylar notch between the  femoral condyles. No normal-appearing anterior cruciate ligament fibers  appear to be present. There is some calcification within the substance  of the posterior cruciate ligament which appears to be intact. There is  a 10 mm intra-articular body, calcified, along the lateral aspect of the  suprapatellar pouch.       Impression:       Advanced osteoarthritic change at the right knee. No  fracture or other acute appearing abnormality is identified.     I called the findings to Dr. Chandler in the emergency department at  around 2:30 PM.     This report was finalized on 1/14/2020 5:15 PM by Dr. Js Stark M.D.       CT Pelvis Without Contrast [[id number]] Collected:  01/14/20 1451     Updated:  01/14/20 1504    Narrative:       PELVIS CT WITHOUT CONTRAST     HISTORY: Right hip pain after a fall. Ankle fractures.     TECHNIQUE: Axial CT of the pelvis was performed without contrast  and is  correlated with pelvis and right femur x-rays performed earlier this  morning. The MRI was performed today and is reported separately.  Radiation dose reduction techniques were utilized, including automated  exposure control and exposure modulation based on body size.     FINDINGS: There is sigmoid colon diverticulosis. No intrapelvic  hemorrhage or lesion is identified. Atheromatous vascular calcification  is observed.     Images through the lower lumbar region demonstrate 6 mm anterolisthesis  of L4 on L5 with severe spinal stenosis at that level. Vertebral height  at L5 is normal.     There is some degenerative change at the hips with chondrocalcinosis.  There is no evidence of pelvis or proximal femur fracture. There is no  hip effusion. There are foci of ossification cephalad to the left  femoral greater trochanter, where there appear to be old tears or  avulsion fractures of the abductor tendons. There is some partial fatty  atrophy of the right gluteus minimus and medius muscles. Other muscles  and tendons around the right hip appear intact. Hamstring origins appear  normal. There is no soft tissue edema or lymphadenopathy.       Impression:       No acute abnormality identified at the pelvis or the right  hip. Specifically, there is no evidence of fracture.     Incidentally noted is degenerative change in the lumbar spine with  severe appearing spinal stenosis at L4-5. Colonic diverticulosis and a  small umbilical hernia containing a bundle of normal-appearing omental  fat are incidentally noted.     This report was finalized on 1/14/2020 3:01 PM by Dr. Js Stark M.D.           EKG                              Rhythm/Rate: SR, 85  P waves and OK: normal  QRS, axis: LVH   ST and T waves: nonspecific ST changes       Current Facility-Administered Medications:   •  aspirin chewable tablet 81 mg, 81 mg, Oral, Daily, Jimbo Guzman MD, 81 mg at 01/15/20 0867  •  gabapentin (NEURONTIN) capsule 100  mg, 100 mg, Oral, Q8H, Liz Guzman MD, 100 mg at 01/15/20 1412  •  HYDROcodone-acetaminophen (NORCO) 5-325 MG per tablet 1 tablet, 1 tablet, Oral, Q6H PRN, Liz Guzman MD, 1 tablet at 01/14/20 2124  •  levothyroxine (SYNTHROID, LEVOTHROID) tablet 112 mcg, 112 mcg, Oral, QAM AC, Liz Guzman MD, 112 mcg at 01/15/20 0609  •  metoprolol succinate XL (TOPROL-XL) 24 hr tablet 25 mg, 25 mg, Oral, Daily, Liz Guzman MD, 25 mg at 01/15/20 0855  •  ondansetron (ZOFRAN) injection 4 mg, 4 mg, Intravenous, Q6H PRN, Liz Guzman MD  •  pantoprazole (PROTONIX) EC tablet 40 mg, 40 mg, Oral, Q AM, Liz Guzman MD, 40 mg at 01/15/20 0609  •  [COMPLETED] Insert peripheral IV, , , Once **AND** sodium chloride 0.9 % flush 10 mL, 10 mL, Intravenous, PRN, Charles Chandler MD  •  sodium chloride 0.9 % with KCl 20 mEq/L infusion, 75 mL/hr, Intravenous, Continuous, Liz Guzman MD, Last Rate: 75 mL/hr at 01/15/20 0855, 75 mL/hr at 01/15/20 0855     ASSESSMENT  Acute traumatic rhabdomyolysis  Right distal tibia-fibula fracture  Hypertension  Hypothyroidism  Osteoarthritis  Gastroesophageal reflux disease    PLAN  CPM  IV fluid  Bedrest  Pain management  Orthopedic surgery consult pending  Adjust home medications  Stress ulcer DVT prophylaxis  Supportive care  Discussed with family  DNR  Follow closely further recommendation current hospital course    LIZ GUZMAN MD

## 2020-01-15 NOTE — DISCHARGE PLACEMENT REQUEST
"Manuela Zepeda (89 y.o. Female)     Date of Birth Social Security Number Address Home Phone MRN    12/20/1923  [id number] [id number] [phone] [id number]    Church Marital Status          Unknown        Admission Date Admission Type Admitting Provider Attending Provider Department, Room/Bed    1/14/20 Emergency Jimbo Guzman MD Ahmed, Aftab, MD [address], [address]/1    Discharge Date Discharge Disposition Discharge Destination                       Attending Provider:  Jimbo Guzman MD    Allergies:  Penicillins    Isolation:  None   Infection:  None   Code Status:  No CPR    Ht:  162.6 cm (64\")   Wt:  69.9 kg (154 lb 1.6 oz)    Admission Cmt:  RIGHT FOOT INJURY-  FELL AT HOME.   Principal Problem:  Closed bimalleolar fracture of right ankle [S82.841A] More...                 Active Insurance as of 1/14/2020     Primary Coverage     Payor Plan Insurance Group Employer/Plan Group    [facility] MEDICARE REPLACEMENT [facility] MEDICARE ADVANTAGE [id number]     Payor Plan Address Payor Plan Phone Number Payor Plan Fax Number Effective Dates    [address] [phone]  1/1/2018 - None Entered    [facility] [address]       Subscriber Name Subscriber Birth Date Member ID       MANUELA ZEPEDA 12/20/1923 [id number]                 Emergency Contacts      (Rel.) Home Phone Work Phone Mobile Phone    GEMMA MARIN (Daughter) [phone] -- --              "

## 2020-01-16 NOTE — PLAN OF CARE
Problem: Patient Care Overview  Goal: Plan of Care Review  Outcome: Ongoing (interventions implemented as appropriate)  Flowsheets  Taken 1/15/2020 0328  Progress: no change  Taken 1/16/2020 0020  Plan of Care Reviewed With: patient  Taken 1/16/2020 0336  Outcome Summary: Pt A & O. Soft cast to RLE, elevated. Purewick in place, shannan colored. Medicated per orders. IVF cont. No s/s of distress at this time, VSS, will cont to monitor.     Problem: Fall Risk (Adult)  Goal: Identify Related Risk Factors and Signs and Symptoms  Outcome: Ongoing (interventions implemented as appropriate)  Flowsheets (Taken 1/15/2020 0328)  Related Risk Factors (Fall Risk): gait/mobility problems;history of falls;inadequate lighting;slippery/uneven surfaces  Signs and Symptoms (Fall Risk): presence of risk factors     Problem: Fall Risk (Adult)  Goal: Absence of Fall  Outcome: Ongoing (interventions implemented as appropriate)  Flowsheets (Taken 1/16/2020 0336)  Absence of Fall: making progress toward outcome     Problem: Skin Injury Risk (Adult)  Goal: Identify Related Risk Factors and Signs and Symptoms  Outcome: Ongoing (interventions implemented as appropriate)  Flowsheets (Taken 1/15/2020 0328)  Related Risk Factors (Skin Injury Risk): advanced age;skeletal deformities;mobility impaired     Problem: Skin Injury Risk (Adult)  Goal: Skin Health and Integrity  Outcome: Ongoing (interventions implemented as appropriate)  Flowsheets (Taken 1/15/2020 0328)  Skin Health and Integrity: making progress toward outcome     Problem: Fracture Orthopaedic (Adult)  Goal: Signs and Symptoms of Listed Potential Problems Will be Absent, Minimized or Managed (Fracture Orthopaedic)  Outcome: Ongoing (interventions implemented as appropriate)  Flowsheets (Taken 1/16/2020 0336)  Problems Assessed (Orthopaedic Fracture): functional deficit/self-care deficit  Problems Present (Orthopaedic Fracture): functional deficit/self-care deficit

## 2020-01-16 NOTE — PROGRESS NOTES
"Daily progress note    Chief complaint  Doing little better  No new complaints f  Family at bedside    History of present illness  89-year-old white female with history of hypertension hypothyroidism and osteoarthritis who lives by herself brought to the emergency room by the family after the unwitnessed fall with back pain right hip pain and right ankle pain.  Patient denies any headache chest pain shortness of breath abdominal pain nausea vomiting diarrhea.  Patient fully alert oriented.  Patient work-up in ER revealed traumatic rhabdomyolysis and right ankle fracture admit for management.     REVIEW OF SYSTEMS  Remarkable for lower extremity pain     PHYSICAL EXAM         Blood pressure 115/53, pulse 64, temperature 97.8 °F (36.6 °C), temperature source Oral, resp. rate 18, height 162.6 cm (64\"), weight 69.9 kg (154 lb 1.6 oz), SpO2 98 %.    Constitutional: She is oriented to person, place, and time. No distress.   Head: Normocephalic and atraumatic.   Eyes: Pupils are equal, round, and reactive to light. EOM are normal.   Neck: Normal range of motion. Neck supple. No muscular tenderness present.   Cardiovascular: Normal rate, regular rhythm and normal heart sounds.   Pulmonary/Chest: Effort normal and breath sounds normal. No respiratory distress.   Abdominal: Soft. There is no tenderness. There is no rebound and no guarding.   Musculoskeletal: She exhibits no edema. Right hip: She exhibits tenderness. Right knee: She exhibits decreased range of motion (secondary to pain) and swelling. Tenderness found. Right ankle: She exhibits swelling. Tenderness. Lumbar back: She exhibits tenderness.   The R leg is shorted and externally rotated.    Neurological: She is alert and oriented to person, place, and time. She has normal sensation and normal strength.   Skin: Skin is warm and dry. Ecchymosis (R lower extremity diffusely) noted. No rash noted.   Psychiatric: Mood and affect normal.     LAB RESULTS  Lab Results (last " 24 hours)     Procedure Component Value Units Date/Time    Basic Metabolic Panel [[id number]]  (Abnormal) Collected:  01/16/20 0533    Specimen:  Blood Updated:  01/16/20 0640     Glucose 103 mg/dL      BUN 16 mg/dL      Creatinine 0.81 mg/dL      Sodium 135 mmol/L      Potassium 4.6 mmol/L      Chloride 102 mmol/L      CO2 23.5 mmol/L      Calcium 8.4 mg/dL      eGFR Non African Amer 66 mL/min/1.73      BUN/Creatinine Ratio 19.8     Anion Gap 9.5 mmol/L     Narrative:       GFR Normal >60  Chronic Kidney Disease <60  Kidney Failure <15      CK [[id number]]  (Abnormal) Collected:  01/16/20 0533    Specimen:  Blood Updated:  01/16/20 0640     Creatine Kinase 207 U/L     CBC & Differential [[id number]] Collected:  01/16/20 0533    Specimen:  Blood Updated:  01/16/20 0610    Narrative:       The following orders were created for panel order CBC & Differential.  Procedure                               Abnormality         Status                     ---------                               -----------         ------                     CBC Auto Differential[[id number]]        Abnormal            Final result                 Please view results for these tests on the individual orders.    CBC Auto Differential [[id number]]  (Abnormal) Collected:  01/16/20 0533    Specimen:  Blood Updated:  01/16/20 0610     WBC 10.80 10*3/mm3      RBC 3.54 10*6/mm3      Hemoglobin 10.3 g/dL      Hematocrit 31.3 %      MCV 88.4 fL      MCH 29.1 pg      MCHC 32.9 g/dL      RDW 13.1 %      RDW-SD 42.3 fl      MPV 10.0 fL      Platelets 271 10*3/mm3      Neutrophil % 74.5 %      Lymphocyte % 14.4 %      Monocyte % 7.7 %      Eosinophil % 2.3 %      Basophil % 0.5 %      Immature Grans % 0.6 %      Neutrophils, Absolute 8.06 10*3/mm3      Lymphocytes, Absolute 1.55 10*3/mm3      Monocytes, Absolute 0.83 10*3/mm3      Eosinophils, Absolute 0.25 10*3/mm3      Basophils, Absolute 0.05 10*3/mm3      Immature Grans, Absolute 0.06 10*3/mm3      nRBC 0.0  /100 WBC         Imaging Results (Last 24 Hours)     ** No results found for the last 24 hours. **        EKG                              Rhythm/Rate: SR, 85  P waves and AR: normal  QRS, axis: LVH   ST and T waves: nonspecific ST changes       Current Facility-Administered Medications:   •  aspirin chewable tablet 81 mg, 81 mg, Oral, Daily, Liz Guzman MD, 81 mg at 01/16/20 1020  •  gabapentin (NEURONTIN) capsule 100 mg, 100 mg, Oral, Q8H, Liz Guzman MD, 100 mg at 01/16/20 0503  •  HYDROcodone-acetaminophen (NORCO) 5-325 MG per tablet 1 tablet, 1 tablet, Oral, Q6H PRN, Liz Guzman MD, 1 tablet at 01/16/20 0520  •  levothyroxine (SYNTHROID, LEVOTHROID) tablet 112 mcg, 112 mcg, Oral, QAM AC, Liz Guzman MD, 112 mcg at 01/16/20 0503  •  metoprolol succinate XL (TOPROL-XL) 24 hr tablet 25 mg, 25 mg, Oral, Daily, Liz Guzman MD, 25 mg at 01/16/20 1020  •  ondansetron (ZOFRAN) injection 4 mg, 4 mg, Intravenous, Q6H PRN, Liz Guzman MD  •  pantoprazole (PROTONIX) EC tablet 40 mg, 40 mg, Oral, Q AM, Liz Guzman MD, 40 mg at 01/16/20 0503  •  [COMPLETED] Insert peripheral IV, , , Once **AND** sodium chloride 0.9 % flush 10 mL, 10 mL, Intravenous, PRN, Charles Chandler MD  •  sodium chloride 0.9 % with KCl 20 mEq/L infusion, 50 mL/hr, Intravenous, Continuous, Liz Guzman MD, Last Rate: 50 mL/hr at 01/16/20 0520, 50 mL/hr at 01/16/20 0520     ASSESSMENT  Acute traumatic rhabdomyolysis  Right distal tibia-fibula fracture  Hypertension  Hypothyroidism  Osteoarthritis  Gastroesophageal reflux disease    PLAN  CPM  IV fluid  Bedrest  Surgery in a.m.  Pain management  Orthopedic surgery input appreciated  Adjust home medications  Stress ulcer DVT prophylaxis  Supportive care  Discussed with family  DNR  Follow closely further recommendation current hospital course    LIZ GUZMAN MD

## 2020-01-16 NOTE — PLAN OF CARE
Soft cast on RLE intact. Toes warm and blanchable. Denies pain at rest and refuses offers of pain meds. Surgery on Friday. Telemetry NSR,      Problem: Patient Care Overview  Goal: Plan of Care Review  Outcome: Ongoing (interventions implemented as appropriate)  Goal: Individualization and Mutuality  Outcome: Ongoing (interventions implemented as appropriate)  Goal: Discharge Needs Assessment  Outcome: Ongoing (interventions implemented as appropriate)  Goal: Interprofessional Rounds/Family Conf  Outcome: Ongoing (interventions implemented as appropriate)     Problem: Patient Care Overview  Goal: Plan of Care Review  Outcome: Ongoing (interventions implemented as appropriate)  Goal: Individualization and Mutuality  Outcome: Ongoing (interventions implemented as appropriate)  Goal: Discharge Needs Assessment  Outcome: Ongoing (interventions implemented as appropriate)  Goal: Interprofessional Rounds/Family Conf  Outcome: Ongoing (interventions implemented as appropriate)     Problem: Fall Risk (Adult)  Goal: Identify Related Risk Factors and Signs and Symptoms  Outcome: Ongoing (interventions implemented as appropriate)  Goal: Absence of Fall  Outcome: Ongoing (interventions implemented as appropriate)     Problem: Skin Injury Risk (Adult)  Goal: Identify Related Risk Factors and Signs and Symptoms  Outcome: Ongoing (interventions implemented as appropriate)  Goal: Skin Health and Integrity  Outcome: Ongoing (interventions implemented as appropriate)     Problem: Fracture Orthopaedic (Adult)  Goal: Signs and Symptoms of Listed Potential Problems Will be Absent, Minimized or Managed (Fracture Orthopaedic)  Outcome: Ongoing (interventions implemented as appropriate)

## 2020-01-17 NOTE — PLAN OF CARE
Problem: Patient Care Overview  Goal: Plan of Care Review  Outcome: Ongoing (interventions implemented as appropriate)  Flowsheets  Taken 1/17/2020 0339  Progress: no change  Outcome Summary: Pt A & O. Soft cast to RLE, elevated. Purewick in place, urine shannan colored. Medicated per orders. NPO since midnight for surgery planned today. No s/s of distress at this time, VSS, will cont to monitor.  Taken 1/17/2020 0026  Plan of Care Reviewed With: patient     Problem: Fall Risk (Adult)  Goal: Identify Related Risk Factors and Signs and Symptoms  Outcome: Ongoing (interventions implemented as appropriate)  Flowsheets (Taken 1/15/2020 0328)  Related Risk Factors (Fall Risk): gait/mobility problems;history of falls;inadequate lighting;slippery/uneven surfaces  Signs and Symptoms (Fall Risk): presence of risk factors     Problem: Fall Risk (Adult)  Goal: Absence of Fall  Outcome: Ongoing (interventions implemented as appropriate)  Flowsheets (Taken 1/17/2020 0339)  Absence of Fall: making progress toward outcome     Problem: Skin Injury Risk (Adult)  Goal: Identify Related Risk Factors and Signs and Symptoms  Outcome: Ongoing (interventions implemented as appropriate)  Flowsheets (Taken 1/15/2020 0328)  Related Risk Factors (Skin Injury Risk): advanced age;skeletal deformities;mobility impaired     Problem: Skin Injury Risk (Adult)  Goal: Skin Health and Integrity  Outcome: Ongoing (interventions implemented as appropriate)  Flowsheets (Taken 1/15/2020 0328)  Skin Health and Integrity: making progress toward outcome     Problem: Fracture Orthopaedic (Adult)  Goal: Signs and Symptoms of Listed Potential Problems Will be Absent, Minimized or Managed (Fracture Orthopaedic)  Outcome: Ongoing (interventions implemented as appropriate)  Flowsheets (Taken 1/16/2020 0336)  Problems Assessed (Orthopaedic Fracture): functional deficit/self-care deficit  Problems Present (Orthopaedic Fracture): functional deficit/self-care  deficit

## 2020-01-17 NOTE — ANESTHESIA PREPROCEDURE EVALUATION
Anesthesia Evaluation     Patient summary reviewed and Nursing notes reviewed   no history of anesthetic complications:  NPO Solid Status: > 8 hours  NPO Liquid Status: > 8 hours           Airway   Mallampati: II  TM distance: >3 FB  Neck ROM: full  No difficulty expected  Dental    (+) poor dentition        Pulmonary    (-) asthma, sleep apnea, rhonchi, decreased breath sounds, wheezes, not a smoker  Cardiovascular   Exercise tolerance: good (4-7 METS)    Rhythm: regular  Rate: normal    (+) hypertension,   (-) CAD, angina, GIANG, murmur      Neuro/Psych  (-) seizures, CVA  GI/Hepatic/Renal/Endo    (+)   thyroid problem hypothyroidism  (-) liver disease, no renal disease, diabetes    Musculoskeletal     Abdominal     Abdomen: soft.   Substance History      OB/GYN          Other                      Anesthesia Plan    ASA 3     regional   total IV anesthesia(Popliteal and adductor canal block with MAC planned   Agreeable to suspension of DNR order while under anesthetic care )  intravenous induction     Anesthetic plan, all risks, benefits, and alternatives have been provided, discussed and informed consent has been obtained with: patient.

## 2020-01-17 NOTE — OP NOTE
Procedure Note    Manuela Gonzalez  1/17/2020    Pre-op Diagnosis:   1.  Closed displaced right bimalleolar ankle fracture  2.  Right ankle syndesmotic instability       Post-Op Diagnosis Codes:  Same    Procedure:  1.  Open reduction internal fixation, right bimalleolar ankle fracture  2.  Open reduction internal fixation, right ankle syndesmosis    Surgeon(s):  Js Montalvo Jr., MD    Anesthesia: General with Block    Estimated Blood Loss: minimal    Specimens:                None      Drains:   None    Complications:   None apparent    Disposition:  Stable to PACU for recovery    Indications for procedure:  The patient is a pleasant 89-year-old female who suffered a closed displaced right ankle fracture.  Operative stabilization was recommended. The risks/benefits of surgery, including pain, infection, wound healing problems, need for future procedures, mal/nonunion, DVT/PE, cardiac event, and/or death were discussed, and the patient elected to proceed with surgery.    Procedure in detail:  The correct patient was identified in preoperative holding.  All risks and benefits of surgery were again discussed in detail, and the patient agreed to proceed with surgery.  The operative extremity was confirmed and marked.  Operative consent reviewed and confirmed to be signed.    At this time, the patient was wheeled to the operative theatre and placed supine on the OR table.  Anesthesia was induced smoothly by our anesthesia colleagues.  The right lower extremity was prepped and draped in standard sterile fashion.  Appropriate presurgical timeout was performed, confirming correct patient, correct extremity, correct procedure, availability of sterile instruments/implants, and the administration of intravenous antibiotics within one hour of skin incision.    The distal fibula is palpated at the lateral ankle and a longitudinal incision is made centered over it with a #15 blade.  Careful subcutaneous dissection is carried  down to the fibula, taking great care to look for and protect any branches of the superficial peroneal nerve.  A #15 blade is then used to gain appropriate subperiosteal dissection at the identified distal fibula fracture.  Hematoma and fibrous contents are gently removed from the fracture site.  An anatomic reduction is performed with manipulation of the ankle and a small reduction clamp, confirmed by direct inspection and with intra-operative fluoroscopy.   The patient's bone quality was exceedingly poor given her age; lag screw fixation was not deemed possible.    The decision was made to proceed with bridge plate technique.  An appropriately-sized Medline distal fibular locking plate is then fashioned on the lateral portion of the distal fibula in appropriate position and provisionally held in place with two olive wires.  We proceed with fixation of this bridge device using 3.5 mm locking screws distally (unicortical) and 3.5 mm cortical screws proximally (bicortical).       Attention then turned medially, where a short longitudinal incision is made over the anteromedial ankle at the medial malleolus.  A pointed reduction clamp is used to anatomically reduce the fracture.  Guide wires for the 4.0 mm cannulated, partially-threaded screws are placed, confirmed to be in appropriate position, overdrilled, and two 4.0 mm partially-threaded screws are placed in lag fashion with excellent maintained reduction and compression of the fracture.    At this time, direct inspection and manual stress tests (Cotton test on mortise/lateral views and ER stress test) were used along with intraoperative fluoroscopy to assess the stability of the syndesmosis and deltoid ligament.  Syndesmotic instability was evident radiographically, but also through direct inspection anterolaterally at the ankle.  The syndesmosis is reduced, with fibular position assessed under direct visualization, as well as by intraoperative fluoroscopy.  The  "ankle and distal syndesmosis remained well-reduced.  Using fluoroscopy as a guide, a tetracortical drill hole for a single 3.5 mm cortical screw was made in appropriate position and screw placed.       Final fluoroscopic AP, mortise, and lateral views of the ankle confirmed anatomic reduction of the ankle/syndesmosis.  All hardware was in appropriate position and of appropriate length.  Stress fluoroscopy confirms excellent stability to the ankle.    The wounds were thoroughly irrigated out of any debris and closed in meticulous, layered fashion with 3-0 Vicryl and finally Nylon on the skin.  The skin was cleaned and dried and a sterile dressing applied, followed by a short leg splint (posterior slab and stirrup) with the ankle in neutral position.  The tourniquet was released and brisk capillary refill returns to all toes.  The patient was awoken from anesthesia without apparent complication and taken to PACU for recovery.    Postoperative Plan:  Weightbearing status: Non-weightbearing in the splint  DVT Prophylaxis: Aspirin 325mg daily;  Patient will be instructed to elevate as much as possible (\"toes above the nose\") and to get up and move around at least once per hour while awake to help minimize risk of blood clots.        Js Montalvo Jr, MD     Date: 1/17/2020  Time: 3:51 PM        "

## 2020-01-17 NOTE — ANESTHESIA POSTPROCEDURE EVALUATION
Patient: Manuela Gonzalez    Procedure Summary     Date:  01/17/20 Room / Location:  [facility] OR [address] [place] [address] MAIN OR    Anesthesia Start:  1418 Anesthesia Stop:  1540    Procedure:  BIMALLEOLAR FRACTURE OPEN REDUCTION INTERNAL FIXATION (Right Ankle) Diagnosis:       Closed bimalleolar fracture of right ankle, initial encounter      (Closed bimalleolar fracture of right ankle, initial encounter [S82.841A])    Surgeon:  Js Montalvo Jr., MD Provider:  Js Kirk MD    Anesthesia Type:  regional ASA Status:  3          Anesthesia Type: regional    Vitals  Vitals Value Taken Time   /64 1/17/2020  3:40 PM   Temp     Pulse 72 1/17/2020  3:42 PM   Resp     SpO2 99 % 1/17/2020  3:42 PM   Vitals shown include unvalidated device data.        Post Anesthesia Care and Evaluation    Patient location during evaluation: bedside  Patient participation: complete - patient participated  Level of consciousness: awake and alert  Pain management: adequate  Airway patency: patent  Anesthetic complications: No anesthetic complications  PONV Status: controlled  Cardiovascular status: blood pressure returned to baseline and acceptable  Respiratory status: acceptable  Hydration status: acceptable

## 2020-01-17 NOTE — PROGRESS NOTES
"Daily progress note    Chief complaint  Doing better  No new complaints   Family at bedside    History of present illness  89-year-old white female with history of hypertension hypothyroidism and osteoarthritis who lives by herself brought to the emergency room by the family after the unwitnessed fall with back pain right hip pain and right ankle pain.  Patient denies any headache chest pain shortness of breath abdominal pain nausea vomiting diarrhea.  Patient fully alert oriented.  Patient work-up in ER revealed traumatic rhabdomyolysis and right ankle fracture admit for management.     REVIEW OF SYSTEMS  Remarkable for lower extremity pain     PHYSICAL EXAM         Blood pressure 130/65, pulse 64, temperature 98.5 °F (36.9 °C), temperature source Oral, resp. rate 18, height 162.6 cm (64\"), weight 74.2 kg (163 lb 9.3 oz), SpO2 97 %.    Constitutional: She is oriented to person, place, and time. No distress.   Head: Normocephalic and atraumatic.   Eyes: Pupils are equal, round, and reactive to light. EOM are normal.   Neck: Normal range of motion. Neck supple. No muscular tenderness present.   Cardiovascular: Normal rate, regular rhythm and normal heart sounds.   Pulmonary/Chest: Effort normal and breath sounds normal. No respiratory distress.   Abdominal: Soft. There is no tenderness. There is no rebound and no guarding.   Musculoskeletal: She exhibits no edema. Right hip: She exhibits tenderness. Right knee: She exhibits decreased range of motion (secondary to pain) and swelling. Tenderness found. Right ankle: She exhibits swelling. Tenderness. Lumbar back: She exhibits tenderness.   The R leg is shorted and externally rotated.    Neurological: She is alert and oriented to person, place, and time. She has normal sensation and normal strength.   Skin: Skin is warm and dry. Ecchymosis (R lower extremity diffusely) noted. No rash noted.   Psychiatric: Mood and affect normal.     LAB RESULTS  Lab Results (last 24 " hours)     Procedure Component Value Units Date/Time    CK [[id number]]  (Abnormal) Collected:  01/17/20 0518    Specimen:  Blood Updated:  01/17/20 0711     Creatine Kinase 387 U/L     Basic Metabolic Panel [[id number]]  (Normal) Collected:  01/17/20 0518    Specimen:  Blood Updated:  01/17/20 0635     Glucose 91 mg/dL      BUN 12 mg/dL      Creatinine 0.67 mg/dL      Sodium 139 mmol/L      Potassium 4.5 mmol/L      Chloride 105 mmol/L      CO2 25.2 mmol/L      Calcium 8.5 mg/dL      eGFR Non African Amer 82 mL/min/1.73      BUN/Creatinine Ratio 17.9     Anion Gap 8.8 mmol/L     Narrative:       GFR Normal >60  Chronic Kidney Disease <60  Kidney Failure <15      CBC & Differential [[id number]] Collected:  01/17/20 0518    Specimen:  Blood Updated:  01/17/20 0607    Narrative:       The following orders were created for panel order CBC & Differential.  Procedure                               Abnormality         Status                     ---------                               -----------         ------                     CBC Auto Differential[[id number]]        Abnormal            Final result                 Please view results for these tests on the individual orders.    CBC Auto Differential [[id number]]  (Abnormal) Collected:  01/17/20 0518    Specimen:  Blood Updated:  01/17/20 0607     WBC 8.30 10*3/mm3      RBC 3.59 10*6/mm3      Hemoglobin 10.3 g/dL      Hematocrit 31.0 %      MCV 86.4 fL      MCH 28.7 pg      MCHC 33.2 g/dL      RDW 13.0 %      RDW-SD 40.4 fl      MPV 9.8 fL      Platelets 301 10*3/mm3      Neutrophil % 66.6 %      Lymphocyte % 20.6 %      Monocyte % 8.0 %      Eosinophil % 3.5 %      Basophil % 0.6 %      Immature Grans % 0.7 %      Neutrophils, Absolute 5.53 10*3/mm3      Lymphocytes, Absolute 1.71 10*3/mm3      Monocytes, Absolute 0.66 10*3/mm3      Eosinophils, Absolute 0.29 10*3/mm3      Basophils, Absolute 0.05 10*3/mm3      Immature Grans, Absolute 0.06 10*3/mm3      nRBC 0.0 /100 WBC           Imaging Results (Last 24 Hours)     ** No results found for the last 24 hours. **        EKG                              Rhythm/Rate: SR, 85  P waves and OH: normal  QRS, axis: LVH   ST and T waves: nonspecific ST changes       Current Facility-Administered Medications:   •  [MAR Hold] aspirin chewable tablet 81 mg, 81 mg, Oral, Daily, Jimbo Guzman MD, 81 mg at 01/16/20 1020  •  clindamycin (CLEOCIN) 600 mg in dextrose 5% 50 mL IVPB (premix), 600 mg, Intravenous, Once, Js Montalvo Jr., MD  •  fentaNYL citrate (PF) (SUBLIMAZE) injection 50 mcg, 50 mcg, Intravenous, Q10 Min PRN, Js Kirk MD, 50 mcg at 01/17/20 1353  •  gabapentin (NEURONTIN) capsule 100 mg, 100 mg, Oral, Q8H, Jimbo Guzman MD, 100 mg at 01/16/20 2120  •  [MAR Hold] HYDROcodone-acetaminophen (NORCO) 5-325 MG per tablet 1 tablet, 1 tablet, Oral, Q6H PRN, Jimbo Guzman MD, 1 tablet at 01/16/20 1808  •  lactated ringers infusion, 9 mL/hr, Intravenous, Continuous, Js Kirk MD, Last Rate: 9 mL/hr at 01/17/20 1345, 9 mL/hr at 01/17/20 1345  •  [MAR Hold] levothyroxine (SYNTHROID, LEVOTHROID) tablet 112 mcg, 112 mcg, Oral, QAM AC, Jimbo Guzman MD, 112 mcg at 01/16/20 0503  •  lidocaine PF 1% (XYLOCAINE) injection 0.5 mL, 0.5 mL, Injection, Once PRN, Js Kirk MD  •  metoprolol succinate XL (TOPROL-XL) 24 hr tablet 25 mg, 25 mg, Oral, Daily, Jimbo Guzman MD, 25 mg at 01/17/20 0814  •  midazolam (VERSED) injection 1 mg, 1 mg, Intravenous, Q5 Min PRN **OR** midazolam (VERSED) injection 2 mg, 2 mg, Intravenous, Q5 Min PRN, Js Kirk MD  •  [MAR Hold] ondansetron (ZOFRAN) injection 4 mg, 4 mg, Intravenous, Q6H PRN, Jimbo Guzman MD  •  [MAR Hold] pantoprazole (PROTONIX) EC tablet 40 mg, 40 mg, Oral, Q AM, Jimbo Guzman MD, 40 mg at 01/16/20 0503  •  [COMPLETED] Insert peripheral IV, , , Once **AND** [MAR Hold] sodium chloride 0.9 % flush 10 mL, 10 mL, Intravenous, PRN, Charles Chandler MD  •  sodium chloride 0.9 %  flush 3 mL, 3 mL, Intravenous, Q12H, Js Kirk MD  •  sodium chloride 0.9 % flush 3-10 mL, 3-10 mL, Intravenous, PRN, Js Kirk MD     ASSESSMENT  Acute traumatic rhabdomyolysis  Right distal tibia-fibula fracture  Hypertension  Hypothyroidism  Osteoarthritis  Gastroesophageal reflux disease    PLAN  CPM  Surgery today  Pain management  Orthopedic surgery input appreciated  Adjust home medications  Stress ulcer DVT prophylaxis  Supportive care  Discussed with family  DNR  Follow closely further recommendation current hospital course    LIZ DUNLAP MD

## 2020-01-17 NOTE — PROGRESS NOTES
Continued Stay Note  [facility]     Patient Name: Manuela Gonzalez  MRN: [id number]  Today's Date: 1/17/2020    Admit Date: 1/14/2020    Discharge Plan     Row Name 01/17/20 0956       Plan    Plan  Plan [facility] for skilled care if accepted- pre cert needed.   KELI Fischer RN    Patient/Family in Agreement with Plan  yes    Plan Comments  Pt to have surgery today at 1400.   [place]  [phone]) following for [facility].   Plan [facility] for skilled care if accepted and pre cert needed.   KELI Fischer, RN        Discharge Codes    No documentation.             Anny Fischer, RN

## 2020-01-17 NOTE — ANESTHESIA PROCEDURE NOTES
Peripheral Block      Patient reassessed immediately prior to procedure    Patient location during procedure: holding area  Start time: 1/17/2020 1:54 PM  Stop time: 1/17/2020 2:04 PM  Reason for block: procedure for pain, at surgeon's request and post-op pain management  Performed by  Anesthesiologist: Js Kirk MD  Preanesthetic Checklist  Completed: patient identified, site marked, surgical consent, pre-op evaluation, timeout performed, IV checked, risks and benefits discussed and monitors and equipment checked  Prep:  Pt Position: supine  Sterile barriers:cap, gloves, sterile barriers and mask  Prep: ChloraPrep  Patient monitoring: blood pressure monitoring, continuous pulse oximetry and EKG  Procedure  Sedation:yes  Performed under: local infiltration  Guidance:ultrasound guided  ULTRASOUND INTERPRETATION. Using ultrasound guidance a 21 G gauge needle was placed in close proximity to the nerve, at which point, under ultrasound guidance anesthetic was injected in the area of the nerve and spread of the anesthesia was seen on ultrasound in close proximity thereto.  There were no abnormalities seen on ultrasound; a digital image was taken; and the patient tolerated the procedure with no complications. Images:still images obtained, printed/placed on chart    Laterality:right  Block Type:popliteal  Injection Technique:single-shot  Needle Type:echogenic  Needle Gauge:21 G  Resistance on Injection: none    Medications Used: ropivacaine (NAROPIN) 0.5 % injection, 40 mL      Post Assessment  Injection Assessment: negative aspiration for heme, no paresthesia on injection and incremental injection  Patient Tolerance:comfortable throughout block  Complications:no

## 2020-01-17 NOTE — ANESTHESIA PROCEDURE NOTES
Airway  Urgency: elective    Date/Time: 1/17/2020 2:27 PM    General Information and Staff    Patient location during procedure: OR  Anesthesiologist: Js Kirk MD  CRNA: Junior Govea CRNA    Indications and Patient Condition  Indications for airway management: airway protection    Preoxygenated: yes  MILS maintained throughout  Mask difficulty assessment: 1 - vent by mask    Final Airway Details  Final airway type: supraglottic airway      Successful airway: unique  Size 4    Number of attempts at approach: 1  Assessment: lips, teeth, and gum same as pre-op and atraumatic intubation

## 2020-01-17 NOTE — ANESTHESIA PROCEDURE NOTES
Peripheral Block      Patient reassessed immediately prior to procedure    Patient location during procedure: holding area  Start time: 1/17/2020 1:45 PM  Stop time: 1/17/2020 1:54 PM  Reason for block: procedure for pain, at surgeon's request and post-op pain management  Performed by  Anesthesiologist: Js Kirk MD  Preanesthetic Checklist  Completed: patient identified, site marked, surgical consent, pre-op evaluation, timeout performed, IV checked, risks and benefits discussed and monitors and equipment checked  Prep:  Pt Position: supine  Sterile barriers:cap, gloves, sterile barriers and mask  Prep: ChloraPrep  Patient monitoring: blood pressure monitoring, continuous pulse oximetry and EKG  Procedure  Sedation:yes  Performed under: local infiltration  Guidance:ultrasound guided  ULTRASOUND INTERPRETATION. Using ultrasound guidance a 21 G gauge needle was placed in close proximity to the nerve, at which point, under ultrasound guidance anesthetic was injected in the area of the nerve and spread of the anesthesia was seen on ultrasound in close proximity thereto.  There were no abnormalities seen on ultrasound; a digital image was taken; and the patient tolerated the procedure with no complications. Images:still images obtained, printed/placed on chart    Laterality:right  Block Type:adductor canal block  Injection Technique:single-shot  Needle Type:echogenic  Needle Gauge:21 G  Resistance on Injection: none    Medications Used: ropivacaine (NAROPIN) 0.5 % injection, 40 mL  Med admintered at 1/17/2020 1:53 PM      Post Assessment  Injection Assessment: negative aspiration for heme, no paresthesia on injection and incremental injection  Patient Tolerance:comfortable throughout block  Complications:no

## 2020-01-18 NOTE — PLAN OF CARE
Problem: Patient Care Overview  Goal: Plan of Care Review  Outcome: Ongoing (interventions implemented as appropriate)  Flowsheets (Taken 1/18/2020 1604)  Progress: no change  Plan of Care Reviewed With: patient;other (see comments) (family )  Outcome Summary: Pt 89 yo female admitted with fall and R ankle fx. S/p R ankle ORIF with current NWBing status RLE. Pt previously conditionally independent using RW. Family reporting both hearing and vision deficits. Pt reports she fell when she got up without RW and knee buckled. Attempted STS today with PT foot under pt's R foot to assess WBing and cue pt to reduce. Pt unable to maintain NWB status RLE and did not progress mobility due to this. Recommend continued skilled PT services to continue to improve understanding and safety with transfers, attempt stand pivot to chair, and strengthen BLEs. Recommend ANGELKIA following d/c.

## 2020-01-18 NOTE — PROGRESS NOTES
"Orthopaedic Surgery  Daily Progress Note    /50   Pulse 69   Temp 97.8 °F (36.6 °C) (Oral)   Resp 16   Ht 162.6 cm (64\")   Wt 74.2 kg (163 lb 9.3 oz)   SpO2 95%   BMI 28.08 kg/m²     Lab Results (last 24 hours)     Procedure Component Value Units Date/Time    Basic Metabolic Panel [[id number]]  (Normal) Collected:  01/18/20 0628    Specimen:  Blood Updated:  01/18/20 0711     Glucose 84 mg/dL      BUN 13 mg/dL      Creatinine 0.65 mg/dL      Sodium 136 mmol/L      Potassium 4.5 mmol/L      Chloride 100 mmol/L      CO2 24.6 mmol/L      Calcium 8.4 mg/dL      eGFR Non African Amer 85 mL/min/1.73      BUN/Creatinine Ratio 20.0     Anion Gap 11.4 mmol/L     Narrative:       GFR Normal >60  Chronic Kidney Disease <60  Kidney Failure <15      CBC & Differential [[id number]] Collected:  01/18/20 0628    Specimen:  Blood Updated:  01/18/20 0644    Narrative:       The following orders were created for panel order CBC & Differential.  Procedure                               Abnormality         Status                     ---------                               -----------         ------                     CBC Auto Differential[[id number]]        Abnormal            Final result                 Please view results for these tests on the individual orders.    CBC Auto Differential [[id number]]  (Abnormal) Collected:  01/18/20 0628    Specimen:  Blood Updated:  01/18/20 0644     WBC 8.28 10*3/mm3      RBC 3.48 10*6/mm3      Hemoglobin 10.2 g/dL      Hematocrit 30.0 %      MCV 86.2 fL      MCH 29.3 pg      MCHC 34.0 g/dL      RDW 13.2 %      RDW-SD 41.3 fl      MPV 9.5 fL      Platelets 287 10*3/mm3      Neutrophil % 76.5 %      Lymphocyte % 12.0 %      Monocyte % 8.1 %      Eosinophil % 2.4 %      Basophil % 0.6 %      Immature Grans % 0.4 %      Neutrophils, Absolute 6.34 10*3/mm3      Lymphocytes, Absolute 0.99 10*3/mm3      Monocytes, Absolute 0.67 10*3/mm3      Eosinophils, Absolute 0.20 10*3/mm3      Basophils, " Absolute 0.05 10*3/mm3      Immature Grans, Absolute 0.03 10*3/mm3      nRBC 0.0 /100 WBC           Imaging Results (Last 24 Hours)     Procedure Component Value Units Date/Time    XR Ankle 3+ View Right [[id number]] Collected:  01/17/20 1548     Updated:  01/17/20 1554    Narrative:       XR ANKLE 3+ VW RIGHT-, FL C ARM DURING SURGERY-     INDICATIONS: Distal right tibia and fibula fractures.     TECHNIQUE: FLUOROSCOPIC ASSISTANCE IN THE OPERATING ROOM.     FINDINGS:     5 intraoperative fluoroscopic spot views were obtained and recorded the  PACS for review, revealing plate and screw hardware fixation of distal  tibia and fibula fractures. One surgical screw extends into both the  distal tibia and distal fibula. Please see operative report for full  details.     Fluoroscopy time: 64.5 seconds       Impression:          As described.     This report was finalized on 1/17/2020 3:51 PM by Dr. Boyd Myers M.D.       FL C Arm During Surgery [[id number]] Collected:  01/17/20 1548     Updated:  01/17/20 1554    Narrative:       XR ANKLE 3+ VW RIGHT-, FL C ARM DURING SURGERY-     INDICATIONS: Distal right tibia and fibula fractures.     TECHNIQUE: FLUOROSCOPIC ASSISTANCE IN THE OPERATING ROOM.     FINDINGS:     5 intraoperative fluoroscopic spot views were obtained and recorded the  PACS for review, revealing plate and screw hardware fixation of distal  tibia and fibula fractures. One surgical screw extends into both the  distal tibia and distal fibula. Please see operative report for full  details.     Fluoroscopy time: 64.5 seconds       Impression:          As described.     This report was finalized on 1/17/2020 3:51 PM by Dr. Boyd Myers M.D.             Patient Care Team:  aVl Crawford MD as PCP - General (Internal Medicine)    SUBJECTIVE  Pain controlled    PHYSICAL EXAM  Resting in NAD  Splint clean, dry, intact  Toes warm, perfused, brisk capillary refill  Flexes/extends toes   SILT over  toes  No pain with passive stretch           Closed bimalleolar fracture of right ankle    Traumatic rhabdomyolysis (CMS/HCC)      PLAN / DISPOSITION:  POD 1 s/p ORIF right ankle , doing well  1. Pain control: Orals  2.  Antibiotics: Periop Clinda to end today  3.  PT: NWB in splint  4. DVT: ASA 325mg daily plus TORI/SCDs, mobilization  5. Dispo: will likely need rehab placement, ok for d/c from ortho perspective when bed available, follow up 2-3 weeks with me at [facility] ([phone] to make appointment)        Js Montalvo Jr, MD  01/18/20  9:15 AM

## 2020-01-18 NOTE — PROGRESS NOTES
"Daily progress note    Chief complaint  Doing same  No new complaints   Status post open reduction to fixation right ankle fracture  Family at bedside    History of present illness  89-year-old white female with history of hypertension hypothyroidism and osteoarthritis who lives by herself brought to the emergency room by the family after the unwitnessed fall with back pain right hip pain and right ankle pain.  Patient denies any headache chest pain shortness of breath abdominal pain nausea vomiting diarrhea.  Patient fully alert oriented.  Patient work-up in ER revealed traumatic rhabdomyolysis and right ankle fracture admit for management.     REVIEW OF SYSTEMS  Remarkable for lower extremity pain     PHYSICAL EXAM         Blood pressure 132/50, pulse 69, temperature 97.8 °F (36.6 °C), temperature source Oral, resp. rate 16, height 162.6 cm (64\"), weight 74.2 kg (163 lb 9.3 oz), SpO2 95 %.    Constitutional: She is oriented to person, place, and time. No distress.   Head: Normocephalic and atraumatic.   Eyes: Pupils are equal, round, and reactive to light. EOM are normal.   Neck: Normal range of motion. Neck supple. No muscular tenderness present.   Cardiovascular: Normal rate, regular rhythm and normal heart sounds.   Pulmonary/Chest: Effort normal and breath sounds normal. No respiratory distress.   Abdominal: Soft. There is no tenderness. There is no rebound and no guarding.   Musculoskeletal: She exhibits no edema. Right hip: She exhibits tenderness. Right knee: She exhibits decreased range of motion (secondary to pain) and swelling. Tenderness found. Right ankle: She exhibits swelling. Tenderness. Lumbar back: She exhibits tenderness.   The R leg is shorted and externally rotated.    Neurological: She is alert and oriented to person, place, and time. She has normal sensation and normal strength.   Skin: Skin is warm and dry. Ecchymosis (R lower extremity diffusely) noted. No rash noted.   Psychiatric: Mood " and affect normal.     LAB RESULTS  Lab Results (last 24 hours)     Procedure Component Value Units Date/Time    Basic Metabolic Panel [[id number]]  (Normal) Collected:  01/18/20 0628    Specimen:  Blood Updated:  01/18/20 0711     Glucose 84 mg/dL      BUN 13 mg/dL      Creatinine 0.65 mg/dL      Sodium 136 mmol/L      Potassium 4.5 mmol/L      Chloride 100 mmol/L      CO2 24.6 mmol/L      Calcium 8.4 mg/dL      eGFR Non African Amer 85 mL/min/1.73      BUN/Creatinine Ratio 20.0     Anion Gap 11.4 mmol/L     Narrative:       GFR Normal >60  Chronic Kidney Disease <60  Kidney Failure <15      CBC & Differential [[id number]] Collected:  01/18/20 0628    Specimen:  Blood Updated:  01/18/20 0644    Narrative:       The following orders were created for panel order CBC & Differential.  Procedure                               Abnormality         Status                     ---------                               -----------         ------                     CBC Auto Differential[[id number]]        Abnormal            Final result                 Please view results for these tests on the individual orders.    CBC Auto Differential [[id number]]  (Abnormal) Collected:  01/18/20 0628    Specimen:  Blood Updated:  01/18/20 0644     WBC 8.28 10*3/mm3      RBC 3.48 10*6/mm3      Hemoglobin 10.2 g/dL      Hematocrit 30.0 %      MCV 86.2 fL      MCH 29.3 pg      MCHC 34.0 g/dL      RDW 13.2 %      RDW-SD 41.3 fl      MPV 9.5 fL      Platelets 287 10*3/mm3      Neutrophil % 76.5 %      Lymphocyte % 12.0 %      Monocyte % 8.1 %      Eosinophil % 2.4 %      Basophil % 0.6 %      Immature Grans % 0.4 %      Neutrophils, Absolute 6.34 10*3/mm3      Lymphocytes, Absolute 0.99 10*3/mm3      Monocytes, Absolute 0.67 10*3/mm3      Eosinophils, Absolute 0.20 10*3/mm3      Basophils, Absolute 0.05 10*3/mm3      Immature Grans, Absolute 0.03 10*3/mm3      nRBC 0.0 /100 WBC         Imaging Results (Last 24 Hours)     Procedure Component Value  Units Date/Time    XR Ankle 3+ View Right [[id number]] Collected:  01/17/20 1548     Updated:  01/17/20 1554    Narrative:       XR ANKLE 3+ VW RIGHT-, FL C ARM DURING SURGERY-     INDICATIONS: Distal right tibia and fibula fractures.     TECHNIQUE: FLUOROSCOPIC ASSISTANCE IN THE OPERATING ROOM.     FINDINGS:     5 intraoperative fluoroscopic spot views were obtained and recorded the  PACS for review, revealing plate and screw hardware fixation of distal  tibia and fibula fractures. One surgical screw extends into both the  distal tibia and distal fibula. Please see operative report for full  details.     Fluoroscopy time: 64.5 seconds       Impression:          As described.     This report was finalized on 1/17/2020 3:51 PM by Dr. Boyd Myers M.D.       FL C Arm During Surgery [[id number]] Collected:  01/17/20 1548     Updated:  01/17/20 1554    Narrative:       XR ANKLE 3+ VW RIGHT-, FL C ARM DURING SURGERY-     INDICATIONS: Distal right tibia and fibula fractures.     TECHNIQUE: FLUOROSCOPIC ASSISTANCE IN THE OPERATING ROOM.     FINDINGS:     5 intraoperative fluoroscopic spot views were obtained and recorded the  PACS for review, revealing plate and screw hardware fixation of distal  tibia and fibula fractures. One surgical screw extends into both the  distal tibia and distal fibula. Please see operative report for full  details.     Fluoroscopy time: 64.5 seconds       Impression:          As described.     This report was finalized on 1/17/2020 3:51 PM by Dr. Boyd Myers M.D.           EKG                              Rhythm/Rate: SR, 85  P waves and WV: normal  QRS, axis: LVH   ST and T waves: nonspecific ST changes       Current Facility-Administered Medications:   •  aspirin EC tablet 325 mg, 325 mg, Oral, Daily, Js Montalvo Jr., MD, 325 mg at 01/18/20 0918  •  clindamycin (CLEOCIN) 600 mg in dextrose 5% 50 mL IVPB (premix), 600 mg, Intravenous, Q8H, Js Montalvo Jr., MD, 600 mg at  01/18/20 0525  •  gabapentin (NEURONTIN) capsule 100 mg, 100 mg, Oral, Q8H, Js Montalvo Jr., MD, 100 mg at 01/18/20 0457  •  HYDROcodone-acetaminophen (NORCO) 5-325 MG per tablet 1 tablet, 1 tablet, Oral, Q6H PRN, Js Montalvo Jr., MD, 1 tablet at 01/18/20 0457  •  lactated ringers infusion, 9 mL/hr, Intravenous, Continuous, Js Montalvo Jr., MD, Stopped at 01/17/20 1530  •  levothyroxine (SYNTHROID, LEVOTHROID) tablet 112 mcg, 112 mcg, Oral, QAM AC, Js Montalvo Jr., MD, 112 mcg at 01/18/20 0631  •  metoprolol succinate XL (TOPROL-XL) 24 hr tablet 25 mg, 25 mg, Oral, Daily, Js Montalvo Jr., MD, 25 mg at 01/18/20 0918  •  morphine injection 2 mg, 2 mg, Intravenous, Q4H PRN, Js Montalvo Jr., MD  •  ondansetron (ZOFRAN) injection 4 mg, 4 mg, Intravenous, Q6H PRN, Js Montalvo Jr., MD  •  pantoprazole (PROTONIX) EC tablet 40 mg, 40 mg, Oral, Q AM, Js Montalvo Jr., MD, 40 mg at 01/18/20 0457  •  [COMPLETED] Insert peripheral IV, , , Once **AND** sodium chloride 0.9 % flush 10 mL, 10 mL, Intravenous, PRN, Js Montalvo Jr., MD, 10 mL at 01/18/20 0918     ASSESSMENT  Acute traumatic rhabdomyolysis  Right ankle fracture status post open reduction to fixation  Hypertension  Hypothyroidism  Osteoarthritis  Gastroesophageal reflux disease    PLAN  CPM  Postop care  Pain management  Orthopedic surgery input appreciated  Adjust home medications  Stress ulcer DVT prophylaxis  Supportive care  Discussed with family  DNR  PT/OT  Follow closely further recommendation current hospital course    LIZ DUNLAP MD

## 2020-01-18 NOTE — THERAPY EVALUATION
Patient Name: Manuela Gonzalez  : 1923    MRN: [id number]                              Today's Date: 2020       Admit Date: 2020    Visit Dx:     ICD-10-CM ICD-9-CM   1. Traumatic rhabdomyolysis, initial encounter (CMS/[facility]) T79.6XXA 958.6   2. Fall, initial encounter W19.XXXA E888.9   3. Closed bimalleolar fracture of right ankle, initial encounter S82.841A 824.4     Patient Active Problem List   Diagnosis   • Traumatic rhabdomyolysis (CMS/[facility])   • Closed bimalleolar fracture of right ankle     Past Medical History:   Diagnosis Date   • Hypertension    • Macular degeneration      Past Surgical History:   Procedure Laterality Date   • APPENDECTOMY     • DIAGNOSTIC LAPAROSCOPY      for ovarian cyst, when patient was 15 years old     General Information     Row Name 20 1558          PT Evaluation Time/Intention    Document Type  evaluation  -LB     Mode of Treatment  physical therapy  -LB     Row Name 20 1558          General Information    Patient Profile Reviewed?  yes  -LB     Prior Level of Function  independent:  -LB     Existing Precautions/Restrictions  fall;non-weight bearing RLE  -LB     Barriers to Rehab  -- difficulty maintaining NWB  -LB     Row Name 20 1558          Relationship/Environment    Lives With  alone  -LB     Row Name 20 1558          Resource/Environmental Concerns    Current Living Arrangements  home/apartment/condo  -LB     Row Name 20 1558          Home Main Entrance    Number of Stairs, Main Entrance  none  -LB     Row Name 20 1558          Safety Issues, Functional Mobility    Safety Issues Affecting Function (Mobility)  awareness of need for assistance;safety precaution awareness;unable to maintain weight-bearing restrictions  -LB     Impairments Affecting Function (Mobility)  balance;strength  -LB     Comment, Safety Issues/Impairments (Mobility)  Difficulty maintaining NWB status with attempted STS. PT foot under RLE and pt unable to  offload completely.  -LB       User Key  (r) = Recorded By, (t) = Taken By, (c) = Cosigned By    Initials Name Provider Type    Nancy Alfaro PT Physical Therapist        Mobility     Row Name 01/18/20 1600          Bed Mobility Assessment/Treatment    Bed Mobility Assessment/Treatment  bed mobility (all) activities;supine-sit;sit-supine  -LB     Rolette Level (Bed Mobility)  moderate assist (50% patient effort)  -LB     Supine-Sit [place] (Bed Mobility)  moderate assist (50% patient effort);2 person assist  -LB     Sit-Supine [place] (Bed Mobility)  maximum assist (25% patient effort);2 person assist  -LB     Assistive Device (Bed Mobility)  draw sheet;head of bed elevated;bed rails  -LB     Comment (Bed Mobility)  pt [place] and nearly blind; dec core strength; difficulty repositioning self in bed  -LB     Row Name 01/18/20 1600          Transfer Assessment/Treatment    Comment (Transfers)  mod A x 2 with RW to stand; PT foot under RLE. Pt unable to maintain NWB status.  -LB     Row Name 01/18/20 1600          Bed-Chair Transfer    Bed-Chair Ro[place] (Transfers)  not tested unsafe due to pt WBing RLE despite VC's and instruction  -LB     Row Name 01/18/20 1600          Gait/Stairs Assessment/Training    Ro[place] Level (Gait)  not tested  -LB     Row Name 01/18/20 1600          Mobility Assessment/Intervention    Extremity Weight-bearing Status  right lower extremity  -LB     Right Lower Extremity (Weight-bearing Status)  non weight-bearing (NWB)  -LB       User Key  (r) = Recorded By, (t) = Taken By, (c) = Cosigned By    Initials Name Provider Type    Nancy Alfaro PT Physical Therapist        Obj/Interventions     Row Name 01/18/20 1603          General ROM    GENERAL ROM COMMENTS  LLE WFL; R ankle dec AROM  -LB     Row Name 01/18/20 1603          MMT (Manual Muscle Testing)    General MMT Comments  LLE grossly 4-/5   -LB     Row Name 01/18/20 1603          Therapeutic Exercise     Sets/Reps (Therapeutic Exercise)  LLE LAQ, ankle pumps, heel slide, x 10; glute set B x 10  -LB     Row Name 01/18/20 1603          Static Sitting Balance    Level of [place]ander (Unsupported Sitting, Static Balance)  contact guard assist  -LB     Sitting Position (Unsupported Sitting, Static Balance)  sitting on edge of bed  -LB     Time Able to Maintain Position (Unsupported Sitting, Static Balance)  2 to 3 minutes  -LB       User Key  (r) = Recorded By, (t) = Taken By, (c) = Cosigned By    Initials Name Provider Type    LB Nancy Brandon, PT Physical Therapist        Goals/Plan     Row Name 01/18/20 1609          Bed Mobility Goal 1 (PT)    Activity/Assistive Device (Bed Mobility Goal 1, PT)  bed mobility activities, all  -LB     [place]ander Level/Cues Needed (Bed Mobility Goal 1, PT)  contact guard assist;verbal cues required  -LB     Time Frame (Bed Mobility Goal 1, PT)  1 week  -LB     Row Name 01/18/20 1609          Transfer Goal 1 (PT)    Activity/Assistive Device (Transfer Goal 1, PT)  sit-to-stand/stand-to-sit;walker, rolling  -LB     [place]ander Level/Cues Needed (Transfer Goal 1, PT)  minimum assist (75% or more patient effort)  -LB     Time Frame (Transfer Goal 1, PT)  1 week  -LB       User Key  (r) = Recorded By, (t) = Taken By, (c) = Cosigned By    Initials Name Provider Type    LB Nancy Brandon, PT Physical Therapist        Clinical Impression     Row Name 01/18/20 1604          Pain Assessment    Additional Documentation  Pain Scale: FACES Pre/Post-Treatment (Group)  -LB     Row Name 01/18/20 1604          Pain Scale: Numbers Pre/Post-Treatment    Pain Location - Side  Right  -LB     Pain Location  ankle  -LB     Pain Intervention(s)  Repositioned;Ambulation/increased activity  -LB     Row Name 01/18/20 1604          Pain Scale: FACES Pre/Post-Treatment    Pain: FACES Scale, Pretreatment  2-->hurts little bit  -LB     Pain: FACES Scale, Post-Treatment  4-->hurts little more  -LB     Pre/Post  Treatment Pain Comment  only reporting pain with dependent position  -LB     Row Name 01/18/20 1604          Plan of Care Review    Plan of Care Reviewed With  patient;other (see comments) family   -LB     Progress  no change  -LB     Outcome Summary  Pt 89 yo female admitted with fall and R ankle fx. S/p R ankle ORIF with current NWBing status RLE. Pt previously conditionally independent using RW. Family reporting both hearing and vision deficits. Pt reports she fell when she got up without RW and knee buckled. Attempted STS today with PT foot under pt's R foot to assess WBing and cue pt to reduce. Pt unable to maintain NWB status RLE and did not progress mobility due to this. Recommend continued skilled PT services to continue to improve understanding and safety with transfers, attempt stand pivot to chair, and strengthen BLEs. Recommend ANGELIKA following d/c.   -LB     Row Name 01/18/20 1604          Physical Therapy Clinical Impression    Patient/Family Goals Statement (PT Clinical Impression)  Pt and family would like to d/c to rehab.   -LB     Criteria for Skilled Interventions Met (PT Clinical Impression)  yes  -LB     Rehab Potential (PT Clinical Summary)  good, to achieve stated therapy goals  -LB     Row Name 01/18/20 1604          Vital Signs    O2 Delivery Pre Treatment  room air  -LB     O2 Delivery Intra Treatment  room air  -LB     O2 Delivery Post Treatment  room air  -LB     Pre Patient Position  Supine  -LB     Intra Patient Position  Standing  -LB     Post Patient Position  Supine  -LB     Row Name 01/18/20 1604          Positioning and Restraints    Pre-Treatment Position  in bed  -LB     Post Treatment Position  bed  -LB     In Bed  supine;call light within reach;encouraged to call for assist;exit alarm on;with family/caregiver  -LB       User Key  (r) = Recorded By, (t) = Taken By, (c) = Cosigned By    Initials Name Provider Type    Nancy Alfaro PT Physical Therapist        Outcome Measures      Row Name 01/18/20 1609          How much help from another person do you currently need...    Turning from your back to your side while in flat bed without using bedrails?  2  -LB     Moving from lying on back to sitting on the side of a flat bed without bedrails?  2  -LB     Moving to and from a bed to a chair (including a wheelchair)?  2  -LB     Standing up from a chair using your arms (e.g., wheelchair, bedside chair)?  2  -LB     Climbing 3-5 steps with a railing?  1  -LB     To walk in hospital room?  1  -LB     AM-PAC 6 Clicks Score (PT)  10  -LB     Row Name 01/18/20 1609          Functional Assessment    Outcome Measure Options  AM-PAC 6 Clicks Basic Mobility (PT)  -LB       User Key  (r) = Recorded By, (t) = Taken By, (c) = Cosigned By    Initials Name Provider Type    Nancy Alfaro PT Physical Therapist          PT Recommendation and Plan     Outcome Summary/Treatment Plan (PT)  Anticipated Discharge Disposition (PT): skilled nursing facility  Plan of Care Reviewed With: patient, other (see comments)(family )  Progress: no change  Outcome Summary: Pt 89 yo female admitted with fall and R ankle fx. S/p R ankle ORIF with current NWBing status RLE. Pt previously conditionally independent using RW. Family reporting both hearing and vision deficits. Pt reports she fell when she got up without RW and knee buckled. Attempted STS today with PT foot under pt's R foot to assess WBing and cue pt to reduce. Pt unable to maintain NWB status RLE and did not progress mobility due to this. Recommend continued skilled PT services to continue to improve understanding and safety with transfers, attempt stand pivot to chair, and strengthen BLEs. Recommend ANGELIKA following d/c.      Time Calculation:   PT Charges     Row Name 01/18/20 1610             Time Calculation    Start Time  1530  -LB      Stop Time  1600  -LB      Time Calculation (min)  30 min  -LB      PT Received On  01/18/20  -LB      PT - Next Appointment   01/19/20  -LB      PT Goal Re-Cert Due Date  01/25/20  -LB         Time Calculation- PT    Total Timed Code Minutes- PT  30 minute(s)  -LB        User Key  (r) = Recorded By, (t) = Taken By, (c) = Cosigned By    Initials Name Provider Type    Nancy Alfaro PT Physical Therapist        Therapy Charges for Today     Code Description Service Date Service Provider Modifiers Qty    11676374443 HC PT AQUA EVAL HIGH  COMPLEXITY 2 1/18/2020 Nancy Brandon, PT GP 1    64939729623 HC PT THER PROC EA 15 MIN 1/18/2020 Nancy Brandon, PT GP 1    87168054150 HC PT THER SUPP EA 15 MIN 1/18/2020 Nancy Brandon, PT GP 1          PT G-Codes  Outcome Measure Options: AM-PAC 6 Clicks Basic Mobility (PT)  AM-PAC 6 Clicks Score (PT): 10    Nancy Brandon PT  1/18/2020

## 2020-01-19 NOTE — PLAN OF CARE
Patient alert forgetful, follows commands, purewick in place. Incont care provided purewick changed this shift. Prn pain meds given. Family at bedside. No acute distress noted. Will continue to montior

## 2020-01-19 NOTE — THERAPY TREATMENT NOTE
Patient Name: Manuela Gonzalez  : 1923    MRN: [id number]                              Today's Date: 2020       Admit Date: 2020    Visit Dx:     ICD-10-CM ICD-9-CM   1. Traumatic rhabdomyolysis, initial encounter (CMS/[facility]) T79.6XXA 958.6   2. Fall, initial encounter W19.XXXA E888.9   3. Closed bimalleolar fracture of right ankle, initial encounter S82.841A 824.4     Patient Active Problem List   Diagnosis   • Traumatic rhabdomyolysis (CMS/[facility])   • Closed bimalleolar fracture of right ankle     Past Medical History:   Diagnosis Date   • Hypertension    • Macular degeneration      Past Surgical History:   Procedure Laterality Date   • APPENDECTOMY     • DIAGNOSTIC LAPAROSCOPY      for ovarian cyst, when patient was 15 years old     General Information     Row Name 20 1607          PT Evaluation Time/Intention    Document Type  therapy note (daily note)  -     Mode of Treatment  physical therapy  -     Row Name 20 1607          General Information    Patient Profile Reviewed?  yes  -     Row Name 20 1607          Safety Issues, Functional Mobility    Comment, Safety Issues/Impairments (Mobility)  difficulty maintaining NWB status  -       User Key  (r) = Recorded By, (t) = Taken By, (c) = Cosigned By    Initials Name Provider Type     Sanam Gaytan, PT Physical Therapist        Mobility     Row Name 20 1608          Bed Mobility Assessment/Treatment    Bed Mobility Assessment/Treatment  supine-sit  -     Supine-Sit [place] (Bed Mobility)  moderate assist (50% patient effort);2 person assist  -     Assistive Device (Bed Mobility)  bed rails;draw sheet;head of bed elevated  -     Row Name 20 1608          Transfer Assessment/Treatment    Comment (Transfers)  mod A x 2 with RW to stand; PT under RLE to monitor wbing status. Pt pivoted on LLE as we turned to walker to sit in chair. Pt needed multiple cues and reminders to off load weight through RLE  -      Row Name 01/19/20 1608          Bed-Chair Transfer    Bed-Chair [place] (Transfers)  moderate assist (50% patient effort);2 person assist  -LC     Assistive Device (Bed-Chair Transfers)  walker, front-wheeled  -LC     Row Name 01/19/20 1608          Sit-Stand Transfer    Sit-Stand [place] (Transfers)  moderate assist (50% patient effort);2 person assist  -LC     Assistive Device (Sit-Stand Transfers)  walker, front-wheeled  -LC     Row Name 01/19/20 1608          Mobility Assessment/Intervention    Extremity Weight-bearing Status  right lower extremity  -LC     Right Lower Extremity (Weight-bearing Status)  non weight-bearing (NWB)  -       User Key  (r) = Recorded By, (t) = Taken By, (c) = Cosigned By    Initials Name Provider Type    Sanam Roth, PT Physical Therapist        Obj/Interventions    No documentation.       Goals/Plan    No documentation.       Clinical Impression     Row Name 01/19/20 1610          Pain Assessment    Additional Documentation  Pain Scale: Numbers Pre/Post-Treatment (Group)  -     Row Name 01/19/20 1610          Pain Scale: Numbers Pre/Post-Treatment    Pain Scale: Numbers, Pretreatment  4/10  -LC     Pain Scale: Numbers, Post-Treatment  4/10  -LC     Pain Location - Side  Right  -LC     Pain Location  ankle  -LC     Pain Intervention(s)  Repositioned;Rest;Ambulation/increased activity  -     Row Name 01/19/20 1610          Plan of Care Review    Outcome Summary  Pt did better today and was able to transfer to chair. She still has difficulty maintaining her NWB status but with frequent cues, she did fairly well. Pt will continued to benefit from PT during her hospital stay SNF upon DC.  -     Row Name 01/19/20 1610          Physical Therapy Clinical Impression    Criteria for Skilled Interventions Met (PT Clinical Impression)  yes;treatment indicated  -     Rehab Potential (PT Clinical Summary)  good, to achieve stated therapy goals  -     Row Name 01/19/20  1610          Positioning and Restraints    Pre-Treatment Position  in bed  -LC     Post Treatment Position  chair  -LC     In Chair  reclined;call light within reach;encouraged to call for assist  -LC       User Key  (r) = Recorded By, (t) = Taken By, (c) = Cosigned By    Initials Name Provider Type    Sanam Roth PT Physical Therapist        Outcome Measures     Row Name 01/19/20 1612          How much help from another person do you currently need...    Turning from your back to your side while in flat bed without using bedrails?  2  -LC     Moving to and from a bed to a chair (including a wheelchair)?  2  -LC     Standing up from a chair using your arms (e.g., wheelchair, bedside chair)?  2  -LC     Climbing 3-5 steps with a railing?  1  -LC     To walk in hospital room?  2  -LC       User Key  (r) = Recorded By, (t) = Taken By, (c) = Cosigned By    Initials Name Provider Type    Sanam Roth PT Physical Therapist          PT Recommendation and Plan     Outcome Summary/Treatment Plan (PT)  Anticipated Discharge Disposition (PT): skilled nursing facility  Progress: improving  Outcome Summary: Pt did better today and was able to transfer to chair. She still has difficulty maintaining her NWB status but with frequent cues, she did fairly well. Pt will continued to benefit from PT during her hospital stay SNF upon DC.     Time Calculation:   PT Charges     Row Name 01/19/20 1613             Time Calculation    Start Time  1552  -      Stop Time  1605  -      Time Calculation (min)  13 min  -      PT Received On  01/19/20  -      PT - Next Appointment  01/20/20  -        User Key  (r) = Recorded By, (t) = Taken By, (c) = Cosigned By    Initials Name Provider Type    Sanam Roth PT Physical Therapist        Therapy Charges for Today     Code Description Service Date Service Provider Modifiers Qty    56983898408  PT THERAPEUTIC ACT EA 15 MIN 1/19/2020 Sanam Gaytan, JONATHAN GP 1          PT  G-Codes  Outcome Measure Options: AM-PAC 6 Clicks Basic Mobility (PT)  AM-PAC 6 Clicks Score (PT): 10    Sanam Gaytan, PT  1/19/2020

## 2020-01-19 NOTE — PROGRESS NOTES
"Daily progress note    Chief complaint  Doing better  No new complaints   Family at bedside    History of present illness  89-year-old white female with history of hypertension hypothyroidism and osteoarthritis who lives by herself brought to the emergency room by the family after the unwitnessed fall with back pain right hip pain and right ankle pain.  Patient denies any headache chest pain shortness of breath abdominal pain nausea vomiting diarrhea.  Patient fully alert oriented.  Patient work-up in ER revealed traumatic rhabdomyolysis and right ankle fracture admit for management.     REVIEW OF SYSTEMS  Remarkable for lower extremity pain     PHYSICAL EXAM         Blood pressure 135/42, pulse 69, temperature 97.8 °F (36.6 °C), temperature source Oral, resp. rate (!) 4, height 162.6 cm (64\"), weight 74.2 kg (163 lb 9.3 oz), SpO2 96 %.    Constitutional: She is oriented to person, place, and time. No distress.   Head: Normocephalic and atraumatic.   Eyes: Pupils are equal, round, and reactive to light. EOM are normal.   Neck: Normal range of motion. Neck supple. No muscular tenderness present.   Cardiovascular: Normal rate, regular rhythm and normal heart sounds.   Pulmonary/Chest: Effort normal and breath sounds normal. No respiratory distress.   Abdominal: Soft. There is no tenderness. There is no rebound and no guarding.   Musculoskeletal: She exhibits no edema. Right hip: She exhibits tenderness. Right knee: She exhibits decreased range of motion (secondary to pain) and swelling. Tenderness found. Right ankle: She exhibits swelling. Tenderness. Lumbar back: She exhibits tenderness.   The R leg is shorted and externally rotated.    Neurological: She is alert and oriented to person, place, and time. She has normal sensation and normal strength.   Skin: Skin is warm and dry. Ecchymosis (R lower extremity diffusely) noted. No rash noted.   Psychiatric: Mood and affect normal.     LAB RESULTS  Lab Results (last 24 " hours)     Procedure Component Value Units Date/Time    Basic Metabolic Panel [[id number]]  (Abnormal) Collected:  01/19/20 0603    Specimen:  Blood Updated:  01/19/20 0716     Glucose 91 mg/dL      BUN 15 mg/dL      Creatinine 0.87 mg/dL      Sodium 138 mmol/L      Potassium 4.3 mmol/L      Chloride 101 mmol/L      CO2 26.8 mmol/L      Calcium 8.4 mg/dL      eGFR Non African Amer 60 mL/min/1.73      BUN/Creatinine Ratio 17.2     Anion Gap 10.2 mmol/L     Narrative:       GFR Normal >60  Chronic Kidney Disease <60  Kidney Failure <15      CK [[id number]]  (Abnormal) Collected:  01/19/20 0603    Specimen:  Blood Updated:  01/19/20 0716     Creatine Kinase 253 U/L     CBC & Differential [[id number]] Collected:  01/19/20 0603    Specimen:  Blood Updated:  01/19/20 0650    Narrative:       The following orders were created for panel order CBC & Differential.  Procedure                               Abnormality         Status                     ---------                               -----------         ------                     CBC Auto Differential[[id number]]        Abnormal            Final result                 Please view results for these tests on the individual orders.    CBC Auto Differential [[id number]]  (Abnormal) Collected:  01/19/20 0603    Specimen:  Blood Updated:  01/19/20 0650     WBC 8.81 10*3/mm3      RBC 3.60 10*6/mm3      Hemoglobin 10.7 g/dL      Hematocrit 31.9 %      MCV 88.6 fL      MCH 29.7 pg      MCHC 33.5 g/dL      RDW 13.1 %      RDW-SD 42.7 fl      MPV 9.8 fL      Platelets 348 10*3/mm3      Neutrophil % 67.5 %      Lymphocyte % 18.0 %      Monocyte % 10.7 %      Eosinophil % 2.5 %      Basophil % 0.6 %      Immature Grans % 0.7 %      Neutrophils, Absolute 5.95 10*3/mm3      Lymphocytes, Absolute 1.59 10*3/mm3      Monocytes, Absolute 0.94 10*3/mm3      Eosinophils, Absolute 0.22 10*3/mm3      Basophils, Absolute 0.05 10*3/mm3      Immature Grans, Absolute 0.06 10*3/mm3      nRBC 0.0 /100  WBC         Imaging Results (Last 24 Hours)     ** No results found for the last 24 hours. **        EKG                              Rhythm/Rate: SR, 85  P waves and CO: normal  QRS, axis: LVH   ST and T waves: nonspecific ST changes       Current Facility-Administered Medications:   •  aspirin EC tablet 325 mg, 325 mg, Oral, Daily, Js Montalvo Jr., MD, 325 mg at 01/19/20 0817  •  HYDROcodone-acetaminophen (NORCO) 5-325 MG per tablet 1 tablet, 1 tablet, Oral, Q6H PRN, Js Montalvo Jr., MD, 1 tablet at 01/19/20 1304  •  lactated ringers infusion, 9 mL/hr, Intravenous, Continuous, sJ Montalvo Jr., MD, Stopped at 01/17/20 1530  •  levothyroxine (SYNTHROID, LEVOTHROID) tablet 112 mcg, 112 mcg, Oral, QAM AC, Js Montalvo Jr., MD, 112 mcg at 01/19/20 0557  •  metoprolol succinate XL (TOPROL-XL) 24 hr tablet 25 mg, 25 mg, Oral, Daily, Js Montalvo Jr., MD, 25 mg at 01/19/20 0817  •  ondansetron (ZOFRAN) injection 4 mg, 4 mg, Intravenous, Q6H PRN, Js Montalvo Jr., MD  •  pantoprazole (PROTONIX) EC tablet 40 mg, 40 mg, Oral, Q AM, Js Montalvo Jr., MD, 40 mg at 01/19/20 0555  •  [COMPLETED] Insert peripheral IV, , , Once **AND** sodium chloride 0.9 % flush 10 mL, 10 mL, Intravenous, PRN, Js Montalvo Jr., MD, 10 mL at 01/19/20 0817     ASSESSMENT  Acute traumatic rhabdomyolysis  Right ankle fracture status post open reduction to fixation  Hypertension  Hypothyroidism  Osteoarthritis  Gastroesophageal reflux disease    PLAN  CPM  Postop care  Pain management  Orthopedic surgery input appreciated  Adjust home medications  Stress ulcer DVT prophylaxis  Supportive care  Discussed with family  DNR  PT/OT  Discharge planning    LIZ DUNLAP MD

## 2020-01-19 NOTE — PLAN OF CARE
Problem: Patient Care Overview  Goal: Plan of Care Review  Outcome: Ongoing (interventions implemented as appropriate)  Flowsheets  Taken 1/19/2020 1613 by Sanam Gaytan, PT  Progress: improving  Taken 1/19/2020 0821 by Marychuy Reina RN  Plan of Care Reviewed With: patient;family  Taken 1/19/2020 1610 by Sanam Gaytan, PT  Outcome Summary: Pt did better today and was able to transfer to chair. She still has difficulty maintaining her NWB status but with frequent cues, she did fairly well. Pt will continued to benefit from PT during her hospital stay SNF upon DC.

## 2020-01-19 NOTE — PLAN OF CARE
Right ankle cast/dressing intact and elevated on pillows. Color and circulation to RLE good. Medicated for pain x 1. Worked with PT. Telemetry NSR.      Problem: Patient Care Overview  Goal: Plan of Care Review  Outcome: Ongoing (interventions implemented as appropriate)  Goal: Individualization and Mutuality  Outcome: Ongoing (interventions implemented as appropriate)  Goal: Discharge Needs Assessment  Outcome: Ongoing (interventions implemented as appropriate)  Goal: Interprofessional Rounds/Family Conf  Outcome: Ongoing (interventions implemented as appropriate)     Problem: Patient Care Overview  Goal: Plan of Care Review  Outcome: Ongoing (interventions implemented as appropriate)  Goal: Individualization and Mutuality  Outcome: Ongoing (interventions implemented as appropriate)  Goal: Discharge Needs Assessment  Outcome: Ongoing (interventions implemented as appropriate)  Goal: Interprofessional Rounds/Family Conf  Outcome: Ongoing (interventions implemented as appropriate)     Problem: Fall Risk (Adult)  Goal: Identify Related Risk Factors and Signs and Symptoms  Outcome: Ongoing (interventions implemented as appropriate)  Goal: Absence of Fall  Outcome: Ongoing (interventions implemented as appropriate)     Problem: Skin Injury Risk (Adult)  Goal: Identify Related Risk Factors and Signs and Symptoms  Outcome: Ongoing (interventions implemented as appropriate)  Goal: Skin Health and Integrity  Outcome: Ongoing (interventions implemented as appropriate)     Problem: Fracture Orthopaedic (Adult)  Goal: Signs and Symptoms of Listed Potential Problems Will be Absent, Minimized or Managed (Fracture Orthopaedic)  Outcome: Ongoing (interventions implemented as appropriate)

## 2020-01-20 NOTE — PROGRESS NOTES
Continued Stay Note  [facility]     Patient Name: Manuela Gonzalez  MRN: [id number]  Today's Date: 1/20/2020    Admit Date: 1/14/2020    Discharge Plan     Row Name 01/20/20 1302       Plan    Plan  Plan [facility] for skilled care- pre cert initiated.   KELI Fischer RN    Patient/Family in Agreement with Plan  yes    Plan Comments  Spoke with pt and pt's daughter  ( Marcus) at bedside. Called  [facility]  ( [phone]) and requested pre cert be initiated.   Pt's family requesting w/c van transport and state they will cover the cost.   Plan [facility] for skilled care.   KELI Fischer RN        Discharge Codes    No documentation.             Anny Fischer, RN

## 2020-01-20 NOTE — PLAN OF CARE
Patient alert forgetful follows commands, family at bedside. Cast of rle, iv fluids kvo, prn pain meds given. No c/o nausea noted. Turn q2h purewick in placed changed this shift. No acute distress noted will continue to montior

## 2020-01-20 NOTE — PLAN OF CARE
Problem: Patient Care Overview  Goal: Plan of Care Review  Flowsheets (Taken 1/20/2020 5019)  Plan of Care Reviewed With: patient; family  Outcome Summary: Pt presents to OT with decreased strength and endurance for adls and functional transfers. Pt and family states pt was independent with adls PTA. Pt currently NWB LE which limits adl function. Pt will continue to benefit fromOT

## 2020-01-20 NOTE — PROGRESS NOTES
"Daily progress note    Chief complaint  Doing better  No new complaints   Family at bedside    History of present illness  89-year-old white female with history of hypertension hypothyroidism and osteoarthritis who lives by herself brought to the emergency room by the family after the unwitnessed fall with back pain right hip pain and right ankle pain.  Patient denies any headache chest pain shortness of breath abdominal pain nausea vomiting diarrhea.  Patient fully alert oriented.  Patient work-up in ER revealed traumatic rhabdomyolysis and right ankle fracture admit for management.     REVIEW OF SYSTEMS  Remarkable for lower extremity pain     PHYSICAL EXAM         Blood pressure 110/51, pulse 68, temperature 97.7 °F (36.5 °C), temperature source Oral, resp. rate 18, height 162.6 cm (64\"), weight 74.2 kg (163 lb 9.3 oz), SpO2 94 %.    Constitutional: She is oriented to person, place, and time. No distress.   Head: Normocephalic and atraumatic.   Eyes: Pupils are equal, round, and reactive to light. EOM are normal.   Neck: Normal range of motion. Neck supple. No muscular tenderness present.   Cardiovascular: Normal rate, regular rhythm and normal heart sounds.   Pulmonary/Chest: Effort normal and breath sounds normal. No respiratory distress.   Abdominal: Soft. There is no tenderness. There is no rebound and no guarding.   Musculoskeletal: She exhibits no edema. Right hip: She exhibits tenderness. Right knee: She exhibits decreased range of motion (secondary to pain) and swelling. Tenderness found. Right ankle: She exhibits swelling. Tenderness. Lumbar back: She exhibits tenderness.   The R leg is shorted and externally rotated.    Neurological: She is alert and oriented to person, place, and time. She has normal sensation and normal strength.   Skin: Skin is warm and dry. Ecchymosis (R lower extremity diffusely) noted. No rash noted.   Psychiatric: Mood and affect normal.     LAB RESULTS  Lab Results (last 24 " hours)     Procedure Component Value Units Date/Time    CK [[id number]]  (Normal) Collected:  01/20/20 0451    Specimen:  Blood Updated:  01/20/20 0632     Creatine Kinase 107 U/L     Basic Metabolic Panel [[id number]]  (Abnormal) Collected:  01/20/20 0451    Specimen:  Blood Updated:  01/20/20 0611     Glucose 101 mg/dL      BUN 13 mg/dL      Creatinine 0.64 mg/dL      Sodium 135 mmol/L      Potassium 4.4 mmol/L      Chloride 99 mmol/L      CO2 26.7 mmol/L      Calcium 8.3 mg/dL      eGFR Non African Amer 86 mL/min/1.73      BUN/Creatinine Ratio 20.3     Anion Gap 9.3 mmol/L     Narrative:       GFR Normal >60  Chronic Kidney Disease <60  Kidney Failure <15      CBC & Differential [[id number]] Collected:  01/20/20 0451    Specimen:  Blood Updated:  01/20/20 0540    Narrative:       The following orders were created for panel order CBC & Differential.  Procedure                               Abnormality         Status                     ---------                               -----------         ------                     CBC Auto Differential[[id number]]        Abnormal            Final result                 Please view results for these tests on the individual orders.    CBC Auto Differential [[id number]]  (Abnormal) Collected:  01/20/20 0451    Specimen:  Blood Updated:  01/20/20 0540     WBC 8.77 10*3/mm3      RBC 3.59 10*6/mm3      Hemoglobin 10.3 g/dL      Hematocrit 30.9 %      MCV 86.1 fL      MCH 28.7 pg      MCHC 33.3 g/dL      RDW 13.2 %      RDW-SD 41.0 fl      MPV 9.8 fL      Platelets 395 10*3/mm3      Neutrophil % 69.8 %      Lymphocyte % 17.1 %      Monocyte % 8.4 %      Eosinophil % 3.0 %      Basophil % 0.7 %      Immature Grans % 1.0 %      Neutrophils, Absolute 6.12 10*3/mm3      Lymphocytes, Absolute 1.50 10*3/mm3      Monocytes, Absolute 0.74 10*3/mm3      Eosinophils, Absolute 0.26 10*3/mm3      Basophils, Absolute 0.06 10*3/mm3      Immature Grans, Absolute 0.09 10*3/mm3      nRBC 0.0 /100 WBC           Imaging Results (Last 24 Hours)     ** No results found for the last 24 hours. **        EKG                              Rhythm/Rate: SR, 85  P waves and OH: normal  QRS, axis: LVH   ST and T waves: nonspecific ST changes       Current Facility-Administered Medications:   •  aspirin EC tablet 325 mg, 325 mg, Oral, Daily, Js Montalvo Jr., MD, 325 mg at 01/20/20 0747  •  HYDROcodone-acetaminophen (NORCO) 5-325 MG per tablet 1 tablet, 1 tablet, Oral, Q6H PRN, Js Montalvo Jr., MD, 1 tablet at 01/20/20 0647  •  lactated ringers infusion, 9 mL/hr, Intravenous, Continuous, Js Montalvo Jr., MD, Stopped at 01/17/20 1530  •  levothyroxine (SYNTHROID, LEVOTHROID) tablet 112 mcg, 112 mcg, Oral, QAM AC, Js Montalvo Jr., MD, 112 mcg at 01/20/20 0647  •  metoprolol succinate XL (TOPROL-XL) 24 hr tablet 25 mg, 25 mg, Oral, Daily, Js Montalvo Jr., MD, 25 mg at 01/20/20 0747  •  ondansetron (ZOFRAN) injection 4 mg, 4 mg, Intravenous, Q6H PRN, Js Montalvo Jr., MD  •  pantoprazole (PROTONIX) EC tablet 40 mg, 40 mg, Oral, Q AM, Js Montalvo Jr., MD, 40 mg at 01/20/20 0647  •  [COMPLETED] Insert peripheral IV, , , Once **AND** sodium chloride 0.9 % flush 10 mL, 10 mL, Intravenous, PRN, Js Montalvo Jr., MD, 10 mL at 01/19/20 0817     ASSESSMENT  Acute traumatic rhabdomyolysis  Right ankle fracture status post open reduction to fixation  Hypertension  Hypothyroidism  Osteoarthritis  Gastroesophageal reflux disease    PLAN  CPM  Postop care  Pain management  Orthopedic surgery input appreciated  Adjust home medications  Stress ulcer DVT prophylaxis  Supportive care  Discussed with family  DNR  PT/OT  Subacute rehab once bed available    LIZ DUNLAP MD

## 2020-01-20 NOTE — PLAN OF CARE
Color and circulation good to RLE. Able to wiggle right toes. Medicated for pain x 1. Up in chair with PT.      Problem: Patient Care Overview  Goal: Plan of Care Review  1/19/2020 1900 by Marychuy Reina RN  Outcome: Ongoing (interventions implemented as appropriate)  1/19/2020 1859 by Marychuy Reina RN  Outcome: Ongoing (interventions implemented as appropriate)  1/19/2020 1859 by Marychuy Reina RN  Outcome: Ongoing (interventions implemented as appropr

## 2020-01-20 NOTE — THERAPY EVALUATION
Acute Care - Occupational Therapy Initial Evaluation  [facility]     Patient Name: Manuela Gonzalez  : 1923  MRN: [id number]  Today's Date: 2020             Admit Date: 2020       ICD-10-CM ICD-9-CM   1. Traumatic rhabdomyolysis, initial encounter (CMS/[facility]) T79.6XXA 958.6   2. Fall, initial encounter W19.XXXA E888.9   3. Closed bimalleolar fracture of right ankle, initial encounter S82.841A 824.4     Patient Active Problem List   Diagnosis   • Traumatic rhabdomyolysis (CMS/[facility])   • Closed bimalleolar fracture of right ankle     Past Medical History:   Diagnosis Date   • Hypertension    • Macular degeneration      Past Surgical History:   Procedure Laterality Date   • ANKLE OPEN REDUCTION INTERNAL FIXATION Right 2020    Procedure: BIMALLEOLAR FRACTURE OPEN REDUCTION INTERNAL FIXATION;  Surgeon: Js Montalvo Jr., MD;  Location: [facility];  Service: Orthopedics   • APPENDECTOMY     • DIAGNOSTIC LAPAROSCOPY      for ovarian cyst, when patient was 15 years old          OT ASSESSMENT FLOWSHEET (last 12 hours)      Occupational Therapy Evaluation     Row Name 20 1326                   OT Evaluation Time/Intention    Document Type  evaluation  -SG        Mode of Treatment  individual therapy;occupational therapy  -SG        Patient Effort  good  -SG           General Information    Patient Profile Reviewed?  yes  -SG        Patient Observations  alert;cooperative;agree to therapy  -SG        Patient/Family Observations  daughter present  -SG        Prior Level of Function  independent:;ADL's per pt and family  -SG        Existing Precautions/Restrictions  fall;non-weight bearing RLE NWB  -SG           Cognitive Assessment/Intervention- PT/OT    Orientation Status (Cognition)  oriented x 3  -SG        Follows Commands (Cognition)  follows one step commands  -SG           ADL Assessment/Intervention    BADL Assessment/Intervention  upper body dressing;lower body  dressing;grooming;toileting;bathing  -           Bathing Assessment/Intervention    Comment (Bathing)  Pt states was bathing in tub but was having difficulty getting out of tub PTA.  -SG           Lower Body Dressing Assessment/Training    Lower Body Dressing [place] Level  lower body dressing skills;dependent (less than 25% patient effort)  -SG        Comment (Lower Body Dressing)  Pt states was independent with adls PTA  -SG           BADL Safety/Performance    Impairments, BADL Safety/Performance  cognition;endurance/activity tolerance;strength  -SG           General ROM    GENERAL ROM COMMENTS  BUE's WFL AROM  -SG           Positioning and Restraints    Pre-Treatment Position  in bed  -SG        Post Treatment Position  bed  -SG        In Bed  supine;call light within reach;encouraged to call for assist;exit alarm on;with family/caregiver  -SG           Pain Assessment    Additional Documentation  Pain Scale: Word Pre/Post-Treatment (Group)  -SG           Pain Scale: Word Pre/Post-Treatment    Pain: Word Scale, Pretreatment  0 - no pain  -SG        Pain: Word Scale, Post-Treatment  0 - no pain  -           Wound 01/17/20 Right lateral ankle Incision    Wound - Properties Group Date first assessed: 01/17/20  -HG Present on Hospital Admission: N  -HG Side: Right  -HG Orientation: lateral  - Location: ankle  -HG Primary Wound Type: Incision  -HG       Plan of Care Review    Plan of Care Reviewed With  patient;daughter  -           Clinical Impression (OT)    OT Diagnosis  need for assist with personal care  -        Criteria for Skilled Therapeutic Interventions Met (OT Eval)  yes;treatment indicated  -        Therapy Frequency (OT Eval)  5 times/wk  -        Care Plan Review (OT)  evaluation/treatment results reviewed  -           Planned OT Interventions    Planned Therapy Interventions (OT Eval)  adaptive equipment training;BADL retraining;transfer/mobility retraining;strengthening exercise   -SG           OT Goals    Bed Mobility Goal Selection (OT)  bed mobility, OT goal 1  -SG        Bathing Goal Selection (OT)  bathing, OT goal 1  -SG        Dressing Goal Selection (OT)  dressing, OT goal 1  -SG           Bed Mobility Goal 1 (OT)    Activity/Assistive Device (Bed Mobility Goal 1, OT)  sit to supine/supine to sit  -SG        [place] Level/Cues Needed (Bed Mobility Goal 1, OT)  moderate assist (50-74% patient effort)  -SG        Time Frame (Bed Mobility Goal 1, OT)  1 week  -SG        Progress/Outcomes (Bed Mobility Goal 1, OT)  goal ongoing  -SG           Bathing Goal 1 (OT)    Activity/Assistive Device (Bathing Goal 1, OT)  bathing skills, all  -SG        [place] Level/Cues Needed (Bathing Goal 1, OT)  minimum assist (75% or more patient effort)  -SG        Time Frame (Bathing Goal 1, OT)  1 week  -SG        Progress/Outcomes (Bathing Goal 1, OT)  goal ongoing  -SG           Dressing Goal 1 (OT)    Activity/Assistive Device (Dressing Goal 1, OT)  dressing skills, all  -SG        [place]/Cues Needed (Dressing Goal 1, OT)  moderate assist (50-74% patient effort)  -SG        Time Frame (Dressing Goal 1, OT)  1 week  -SG        Progress/Outcome (Dressing Goal 1, OT)  goal ongoing  -SG          User Key  (r) = Recorded By, (t) = Taken By, (c) = Cosigned By    Initials Name Effective Dates    Cristela Noble OTR 06/08/18 -     HG Saba Ferrer RN 11/25/19 -                OT Recommendation and Plan  Planned Therapy Interventions (OT Eval): adaptive equipment training, BADL retraining, transfer/mobility retraining, strengthening exercise  Therapy Frequency (OT Eval): 5 times/wk  Plan of Care Review  Plan of Care Reviewed With: patient, family  Plan of Care Reviewed With: patient, family  Outcome Summary: Pt presents to OT with decreased strength and endurance for adls and functional transfers. Pt and family states pt was independent with adls PTA. Pt currently NWB LE which limits adl  function. Pt will continue to benefit fromOT    Outcome Measures     Row Name 01/20/20 1340             How much help from another is currently needed...    Putting on and taking off regular lower body clothing?  1  -SG      Bathing (including washing, rinsing, and drying)  2  -SG      Toileting (which includes using toilet bed pan or urinal)  1  -SG      Putting on and taking off regular upper body clothing  3  -SG      Taking care of personal grooming (such as brushing teeth)  3  -SG      Eating meals  4  -SG      AM-PAC 6 Clicks Score (OT)  14  -SG         Functional Assessment    Outcome Measure Options  AM-PAC 6 Clicks Daily Activity (OT)  -SG        User Key  (r) = Recorded By, (t) = Taken By, (c) = Cosigned By    Initials Name Provider Type    Cristela Noble OTR Occupational Therapist          Time Calculation:   Time Calculation- OT     Row Name 01/20/20 1341             Time Calculation- OT    OT Start Time  1013  -SG      OT Stop Time  1030  -SG      OT Time Calculation (min)  17 min  -      Total Timed Code Minutes- OT  8 minute(s)  -SG      OT Received On  01/20/20  -      OT Goal Re-Cert Due Date  01/27/20  -        User Key  (r) = Recorded By, (t) = Taken By, (c) = Cosigned By    Initials Name Provider Type    Cristela Noble OTR Occupational Therapist        Therapy Charges for Today     Code Description Service Date Service Provider Modifiers Qty    [id number]  OT EVAL LOW COMPLEXITY 2 1/20/2020 Cristela Mesa OTR GO 1    [id number]  OT SELF CARE/MGMT/TRAIN EA 15 MIN 1/20/2020 Cristela Mesa OTR GO 1               PAOLO Mccormick  1/20/2020

## 2020-01-20 NOTE — PLAN OF CARE
Problem: Patient Care Overview  Goal: Plan of Care Review  Outcome: Ongoing (interventions implemented as appropriate)  Flowsheets (Taken 1/20/2020 2355)  Progress: improving  Plan of Care Reviewed With: patient  Outcome Summary: Pt tolerated treatment with no complaints. Pt is modAX2 for bed mobility requiring assist with the RLE and verbal cues given throughout. Pt is ModAX2 for STS transfers and stand pivot transfers. Pt required multiple cues and demonstration to maintain NWB status, however pt having difficulty.  Pt performed Left LE exercises. Will continue to progress pt as able.

## 2020-01-20 NOTE — THERAPY TREATMENT NOTE
Patient Name: Manuela Gonzalez  : 1923    MRN: [id number]                              Today's Date: 2020       Admit Date: 2020    Visit Dx:     ICD-10-CM ICD-9-CM   1. Traumatic rhabdomyolysis, initial encounter (CMS/[facility]) T79.6XXA 958.6   2. Fall, initial encounter W19.XXXA E888.9   3. Closed bimalleolar fracture of right ankle, initial encounter S82.841A 824.4     Patient Active Problem List   Diagnosis   • Traumatic rhabdomyolysis (CMS/[facility])   • Closed bimalleolar fracture of right ankle     Past Medical History:   Diagnosis Date   • Hypertension    • Macular degeneration      Past Surgical History:   Procedure Laterality Date   • ANKLE OPEN REDUCTION INTERNAL FIXATION Right 2020    Procedure: BIMALLEOLAR FRACTURE OPEN REDUCTION INTERNAL FIXATION;  Surgeon: Js Montalvo Jr., MD;  Location: [facility];  Service: Orthopedics   • APPENDECTOMY     • DIAGNOSTIC LAPAROSCOPY      for ovarian cyst, when patient was 15 years old     General Information     Row Name 20 1644          PT Evaluation Time/Intention    Document Type  therapy note (daily note)  -     Mode of Treatment  physical therapy;individual therapy  -     Row Name 20 164          General Information    Existing Precautions/Restrictions  fall;non-weight bearing NWB RLE  -     Row Name 20 164          Cognitive Assessment/Intervention- PT/OT    Orientation Status (Cognition)  oriented x 3  -     Row Name 20 164          Safety Issues, Functional Mobility    Safety Issues Affecting Function (Mobility)  unable to maintain weight-bearing restrictions  -     Impairments Affecting Function (Mobility)  balance;strength  -     Comment, Safety Issues/Impairments (Mobility)  difficulty with maintaining NWB status  -       User Key  (r) = Recorded By, (t) = Taken By, (c) = Cosigned By    Initials Name Provider Type     Cherry Viera PTA Physical Therapy Assistant        Mobility     Row Name  01/20/20 1645          Bed Mobility Assessment/Treatment    Supine-Sit [place] (Bed Mobility)  moderate assist (50% patient effort);2 person assist;verbal cues;nonverbal cues (demo/gesture)  -     Sit-Supine [place] (Bed Mobility)  not tested  -     Assistive Device (Bed Mobility)  bed rails;head of bed elevated  -     Comment (Bed Mobility)  pt [place] and nearly blind. Pt requires assistance with RLE  -     Row Name 01/20/20 1645          Transfer Assessment/Treatment    Comment (Transfers)  pt pivoting from bed to chair. Pt has difficulty maintaining NWB precautions throughout treatment. Multiple cues and demo needed.   -     Row Name 01/20/20 1645          Bed-Chair Transfer    Bed-Chair [place] (Transfers)  moderate assist (50% patient effort);2 person assist;verbal cues  -     Assistive Device (Bed-Chair Transfers)  walker, front-wheeled  -     Row Name 01/20/20 1645          Sit-Stand Transfer    Sit-Stand [place] (Transfers)  moderate assist (50% patient effort);2 person assist;verbal cues  -     Assistive Device (Sit-Stand Transfers)  walker, front-wheeled  -     Row Name 01/20/20 1645          Mobility Assessment/Intervention    Extremity Weight-bearing Status  right lower extremity  -     Right Lower Extremity (Weight-bearing Status)  non weight-bearing (NWB)  -       User Key  (r) = Recorded By, (t) = Taken By, (c) = Cosigned By    Initials Name Provider Type     Cherry Viera PTA Physical Therapy Assistant        Obj/Interventions     Row Name 01/20/20 1647          Therapeutic Exercise    Lower Extremity (Therapeutic Exercise)  heel slides, left  -     Lower Extremity Range of Motion (Therapeutic Exercise)  hip abduction/adduction, left;ankle dorsiflexion/plantar flexion, left  -     Exercise Type (Therapeutic Exercise)  AROM (active range of motion)  -     Position (Therapeutic Exercise)  supine  -     Row Name 01/20/20 1647          Static Sitting  Balance    Level of [place] (Unsupported Sitting, Static Balance)  contact guard assist  -     Sitting Position (Unsupported Sitting, Static Balance)  sitting on edge of bed  -     Time Able to Maintain Position (Unsupported Sitting, Static Balance)  2 to 3 minutes  -       User Key  (r) = Recorded By, (t) = Taken By, (c) = Cosigned By    Initials Name Provider Type     Cherry Viera PTA Physical Therapy Assistant        Goals/Plan    No documentation.       Clinical Impression     Row Name 01/20/20 1648          Pain Scale: Numbers Pre/Post-Treatment    Pain Scale: Numbers, Pretreatment  0/10 - no pain  -     Row Name 01/20/20 1648          Plan of Care Review    Plan of Care Reviewed With  patient  -     Progress  improving  -     Outcome Summary  Pt tolerated treatment with no complaints. Pt is modAX2 for bed mobility requiring assist with the RLE and verbal cues given throughout. Pt is ModAX2 for STS transfers and stand pivot transfers. Pt required multiple cues and demonstration to maintain NWB status, however pt having difficulty.  Pt performed Left LE exercises. Will continue to progress pt as able.   -     Row Name 01/20/20 1649          Positioning and Restraints    Pre-Treatment Position  in bed  -     Post Treatment Position  chair  -     In Chair  reclined;call light within reach;encouraged to call for assist;LLE elevated notified ns of no chair alarms on floor  -       User Key  (r) = Recorded By, (t) = Taken By, (c) = Cosigned By    Initials Name Provider Type     Cherry Viera PTA Physical Therapy Assistant        Outcome Measures     Row Name 01/20/20 5143          How much help from another person do you currently need...    Turning from your back to your side while in flat bed without using bedrails?  2  -EH     Moving from lying on back to sitting on the side of a flat bed without bedrails?  2  -EH     Moving to and from a bed to a chair (including a wheelchair)?  2   -EH     Standing up from a chair using your arms (e.g., wheelchair, bedside chair)?  2  -EH     Climbing 3-5 steps with a railing?  1  -EH     To walk in hospital room?  2  -EH     AM-PAC 6 Clicks Score (PT)  11  -       User Key  (r) = Recorded By, (t) = Taken By, (c) = Cosigned By    Initials Name Provider Type     Cherry Viera PTA Physical Therapy Assistant          PT Recommendation and Plan     Plan of Care Reviewed With: patient  Progress: improving  Outcome Summary: Pt tolerated treatment with no complaints. Pt is modAX2 for bed mobility requiring assist with the RLE and verbal cues given throughout. Pt is ModAX2 for STS transfers and stand pivot transfers. Pt required multiple cues and demonstration to maintain NWB status, however pt having difficulty.  Pt performed Left LE exercises. Will continue to progress pt as able.      Time Calculation:   PT Charges     Row Name 01/20/20 1644             Time Calculation    Start Time  1622  -      Stop Time  1645  -      Time Calculation (min)  23 min  -      PT Received On  01/20/20  -      PT - Next Appointment  01/21/20  -         Time Calculation- PT    Total Timed Code Minutes- PT  23 minute(s)  -        User Key  (r) = Recorded By, (t) = Taken By, (c) = Cosigned By    Initials Name Provider Type     Cherry Viera PTA Physical Therapy Assistant        Therapy Charges for Today     Code Description Service Date Service Provider Modifiers Qty    91153510007 HC PT THER PROC EA 15 MIN 1/20/2020 Cherry Viera PTA GP 2    80237495741 HC PT THER SUPP EA 15 MIN 1/20/2020 Cherry Viera PTA GP 1          PT G-Codes  Outcome Measure Options: AM-PAC 6 Clicks Daily Activity (OT)  AM-PAC 6 Clicks Score (PT): 11  AM-PAC 6 Clicks Score (OT): 14    Cherry Viera PTA  1/20/2020

## 2020-01-21 NOTE — PLAN OF CARE
Patient alert and oriented but can be forgetful at times. Patient had 2 BM this shift, states she feels much better. Patient is non-weight bearing for 6 weeks. Patiented turned Q2. Daughter at bedside, attentive to patient. Vital signs are stable. No acute distress noted. Will continue to monitor.

## 2020-01-21 NOTE — PLAN OF CARE
Problem: Patient Care Overview  Goal: Plan of Care Review  Outcome: Ongoing (interventions implemented as appropriate)  Flowsheets (Taken 1/21/2020 1618)  Progress: improving  Plan of Care Reviewed With: patient  Outcome Summary: Pt tolerated treatment with no complaints. Pt is improving with bed mobility. Pt is MinAX2 with bed mobility with verbal cueing and assist with the right LE. Pt is ModAX2 for STS transfers. Pt still having difficulty with maintaining NWB precautions of the RLE. pt will take shuffling steps from bed to chair with a stand pivot transfers. Pt able to perform bilateral LE exercises with minimal c/o difficulty. Will continue to progress pt as able.

## 2020-01-21 NOTE — THERAPY TREATMENT NOTE
Patient Name: Manuela Gonzalez  : 1923    MRN: [id number]                              Today's Date: 2020       Admit Date: 2020    Visit Dx:     ICD-10-CM ICD-9-CM   1. Traumatic rhabdomyolysis, initial encounter (CMS/[facility]) T79.6XXA 958.6   2. Fall, initial encounter W19.XXXA E888.9   3. Closed bimalleolar fracture of right ankle, initial encounter S82.841A 824.4     Patient Active Problem List   Diagnosis   • Traumatic rhabdomyolysis (CMS/[facility])   • Closed bimalleolar fracture of right ankle     Past Medical History:   Diagnosis Date   • Hypertension    • Macular degeneration      Past Surgical History:   Procedure Laterality Date   • ANKLE OPEN REDUCTION INTERNAL FIXATION Right 2020    Procedure: BIMALLEOLAR FRACTURE OPEN REDUCTION INTERNAL FIXATION;  Surgeon: Js Montalvo Jr., MD;  [facility];  Service: Orthopedics   • APPENDECTOMY     • DIAGNOSTIC LAPAROSCOPY      for ovarian cyst, when patient was 15 years old     General Information     Row Name 20 1614          PT Evaluation Time/Intention    Document Type  therapy note (daily note)  -     Mode of Treatment  physical therapy;individual therapy  -     Row Name 20 161          General Information    Existing Precautions/Restrictions  fall;non-weight bearing NWB RLE  -     Barriers to Rehab  hearing deficit;visual deficit [place], nearly blind  -     Row Name 20          Cognitive Assessment/Intervention- PT/OT    Orientation Status (Cognition)  oriented x 3  -     Row Name 20          Safety Issues, Functional Mobility    Safety Issues Affecting Function (Mobility)  unable to maintain weight-bearing restrictions  -     Impairments Affecting Function (Mobility)  balance;strength  -       User Key  (r) = Recorded By, (t) = Taken By, (c) = Cosigned By    Initials Name Provider Type     Cherry Viera PTA Physical Therapy Assistant        Mobility     Row Name 20 1610           Bed Mobility Assessment/Treatment    Supine-Sit [place] (Bed Mobility)  minimum assist (75% patient effort);2 person assist;verbal cues;nonverbal cues (demo/gesture) assist with RLE mainly. Verbal cues for hand placement on rails  -     Sit-Supine [place] (Bed Mobility)  not tested  -     Assistive Device (Bed Mobility)  bed rails;head of bed elevated  -     Row Name 01/21/20 1616          Bed-Chair Transfer    Bed-Chair [place] (Transfers)  moderate assist (50% patient effort);2 person assist;verbal cues  -     Assistive Device (Bed-Chair Transfers)  walker, front-wheeled  -     Row Name 01/21/20 1616          Sit-Stand Transfer    Sit-Stand [place] (Transfers)  moderate assist (50% patient effort);2 person assist;verbal cues  -     Assistive Device (Sit-Stand Transfers)  walker, front-wheeled  -     Row Name 01/21/20 1616          Gait/Stairs Assessment/Training    Comment (Gait/Stairs)  pt unable to maintain NWB with pivot transfers. Pt has difficulty due to weak UE strength. Pt will tend to put weight through toes and shuffle from bed to chair.   -     Row Name 01/21/20 1614          Mobility Assessment/Intervention    Extremity Weight-bearing Status  right lower extremity  -     Right Lower Extremity (Weight-bearing Status)  non weight-bearing (NWB)  -       User Key  (r) = Recorded By, (t) = Taken By, (c) = Cosigned By    Initials Name Provider Type     Cherry Viera PTA Physical Therapy Assistant        Obj/Interventions     Row Name 01/21/20 1618          Therapeutic Exercise    Lower Extremity (Therapeutic Exercise)  heel slides, bilateral;quad sets, bilateral  -     Lower Extremity Range of Motion (Therapeutic Exercise)  hip abduction/adduction, bilateral;ankle dorsiflexion/plantar flexion, left  -     Exercise Type (Therapeutic Exercise)  AROM (active range of motion)  -     Position (Therapeutic Exercise)  seated reclined  -     Sets/Reps (Therapeutic  Exercise)  X10  -     Row Name 01/21/20 1618          Static Sitting Balance    Level of [place] (Unsupported Sitting, Static Balance)  contact guard assist  -     Sitting Position (Unsupported Sitting, Static Balance)  sitting on edge of bed  -     Time Able to Maintain Position (Unsupported Sitting, Static Balance)  45 to 60 seconds  -       User Key  (r) = Recorded By, (t) = Taken By, (c) = Cosigned By    Initials Name Provider Type     Cherry Viera PTA Physical Therapy Assistant        Goals/Plan    No documentation.       Clinical Impression     Row Name 01/21/20 1618          Pain Scale: Numbers Pre/Post-Treatment    Pain Scale: Numbers, Pretreatment  0/10 - no pain  -     Row Name 01/21/20 1618          Plan of Care Review    Plan of Care Reviewed With  patient  -     Progress  improving  -     Outcome Summary  Pt tolerated treatment with no complaints. Pt is improving with bed mobility. Pt is MinAX2 with bed mobility with verbal cueing and assist with the right LE. Pt is ModAX2 for STS transfers. Pt still having difficulty with maintaining NWB precautions of the RLE. pt will take shuffling steps from bed to chair with a stand pivot transfers. Pt able to perform bilateral LE exercises with minimal c/o difficulty. Will continue to progress pt as able.    -     Row Name 01/21/20 1618          Vital Signs    O2 Delivery Pre Treatment  room air  -     O2 Delivery Intra Treatment  room air  -     O2 Delivery Post Treatment  room air  -     Row Name 01/21/20 1618          Positioning and Restraints    Pre-Treatment Position  in bed  -     Post Treatment Position  chair  -     In Chair  reclined;call light within reach;encouraged to call for assist;with family/caregiver  -       User Key  (r) = Recorded By, (t) = Taken By, (c) = Cosigned By    Initials Name Provider Type     Cherry Viera PTA Physical Therapy Assistant        Outcome Measures     Row Name 01/21/20 1623           How much help from another person do you currently need...    Turning from your back to your side while in flat bed without using bedrails?  2  -EH     Moving from lying on back to sitting on the side of a flat bed without bedrails?  2  -EH     Moving to and from a bed to a chair (including a wheelchair)?  2  -EH     Standing up from a chair using your arms (e.g., wheelchair, bedside chair)?  2  -EH     Climbing 3-5 steps with a railing?  1  -EH     To walk in hospital room?  1  -EH     AM-PAC 6 Clicks Score (PT)  10  -       User Key  (r) = Recorded By, (t) = Taken By, (c) = Cosigned By    Initials Name Provider Type     Cherry Viera PTA Physical Therapy Assistant          PT Recommendation and Plan     Plan of Care Reviewed With: patient  Progress: improving  Outcome Summary: Pt tolerated treatment with no complaints. Pt is improving with bed mobility. Pt is MinAX2 with bed mobility with verbal cueing and assist with the right LE. Pt is ModAX2 for STS transfers. Pt still having difficulty with maintaining NWB precautions of the RLE. pt will take shuffling steps from bed to chair with a stand pivot transfers. Pt able to perform bilateral LE exercises with minimal c/o difficulty. Will continue to progress pt as able.       Time Calculation:   PT Charges     Row Name 01/21/20 1613             Time Calculation    Start Time  1538  -      Stop Time  1552  -      Time Calculation (min)  14 min  -      PT Received On  01/21/20  -      PT - Next Appointment  01/22/20  -         Time Calculation- PT    Total Timed Code Minutes- PT  14 minute(s)  -        User Key  (r) = Recorded By, (t) = Taken By, (c) = Cosigned By    Initials Name Provider Type     Cherry Viera PTA Physical Therapy Assistant        Therapy Charges for Today     Code Description Service Date Service Provider Modifiers Qty    30491491110 HC PT THER PROC EA 15 MIN 1/20/2020 Cherry Viera PTA GP 2    54564484778 HC PT THER SUPP EA  15 MIN 1/20/2020 Cherry Viera, NERY GP 1    76894472242 HC PT THER PROC EA 15 MIN 1/21/2020 Cherry Viera, PTA GP 1    11783246018 HC PT THER SUPP EA 15 MIN 1/21/2020 Cherry Viera, NERY GP 1          PT G-Codes  Outcome Measure Options: AM-PAC 6 Clicks Daily Activity (OT)  AM-PAC 6 Clicks Score (PT): 10  AM-PAC 6 Clicks Score (OT): 14    Cherry Viera PTA  1/21/2020

## 2020-01-21 NOTE — PROGRESS NOTES
"Daily progress note    Chief complaint  Doing better  No new complaints   Family at bedside    History of present illness  89-year-old white female with history of hypertension hypothyroidism and osteoarthritis who lives by herself brought to the emergency room by the family after the unwitnessed fall with back pain right hip pain and right ankle pain.  Patient denies any headache chest pain shortness of breath abdominal pain nausea vomiting diarrhea.  Patient fully alert oriented.  Patient work-up in ER revealed traumatic rhabdomyolysis and right ankle fracture admit for management.     REVIEW OF SYSTEMS  Remarkable for lower extremity pain     PHYSICAL EXAM         Blood pressure 136/57, pulse 75, temperature 97.9 °F (36.6 °C), temperature source Oral, resp. rate 18, height 162.6 cm (64\"), weight 74.2 kg (163 lb 9.3 oz), SpO2 96 %.    Constitutional: She is oriented to person, place, and time. No distress.   Head: Normocephalic and atraumatic.   Eyes: Pupils are equal, round, and reactive to light. EOM are normal.   Neck: Normal range of motion. Neck supple. No muscular tenderness present.   Cardiovascular: Normal rate, regular rhythm and normal heart sounds.   Pulmonary/Chest: Effort normal and breath sounds normal. No respiratory distress.   Abdominal: Soft. There is no tenderness. There is no rebound and no guarding.   Musculoskeletal: She exhibits no edema. Right hip: She exhibits tenderness. Right knee: She exhibits decreased range of motion (secondary to pain) and swelling. Tenderness found. Right ankle: She exhibits swelling. Tenderness. Lumbar back: She exhibits tenderness.   The R leg is shorted and externally rotated.    Neurological: She is alert and oriented to person, place, and time. She has normal sensation and normal strength.   Skin: Skin is warm and dry. Ecchymosis (R lower extremity diffusely) noted. No rash noted.   Psychiatric: Mood and affect normal.     LAB RESULTS  Lab Results (last 24 " hours)     ** No results found for the last 24 hours. **        Imaging Results (Last 24 Hours)     ** No results found for the last 24 hours. **        EKG                              Rhythm/Rate: SR, 85  P waves and AK: normal  QRS, axis: LVH   ST and T waves: nonspecific ST changes       Current Facility-Administered Medications:   •  aspirin EC tablet 325 mg, 325 mg, Oral, Daily, Js Montalvo Jr., MD, 325 mg at 01/21/20 0829  •  HYDROcodone-acetaminophen (NORCO) 5-325 MG per tablet 1 tablet, 1 tablet, Oral, Q6H PRN, Js Montalvo Jr., MD, 1 tablet at 01/21/20 0235  •  lactated ringers infusion, 9 mL/hr, Intravenous, Continuous, Js Montalvo Jr., MD, Stopped at 01/17/20 1530  •  levothyroxine (SYNTHROID, LEVOTHROID) tablet 112 mcg, 112 mcg, Oral, QAM AC, Js Montalvo Jr., MD, 112 mcg at 01/21/20 0605  •  metoprolol succinate XL (TOPROL-XL) 24 hr tablet 25 mg, 25 mg, Oral, Daily, Js Montalvo Jr., MD, 25 mg at 01/21/20 0829  •  ondansetron (ZOFRAN) injection 4 mg, 4 mg, Intravenous, Q6H PRN, Js Montalvo Jr., MD  •  pantoprazole (PROTONIX) EC tablet 40 mg, 40 mg, Oral, Q AM, Js Montalvo Jr., MD, 40 mg at 01/21/20 0606  •  [COMPLETED] Insert peripheral IV, , , Once **AND** sodium chloride 0.9 % flush 10 mL, 10 mL, Intravenous, PRN, Js Montalvo Jr., MD, 10 mL at 01/19/20 0817     ASSESSMENT  Acute traumatic rhabdomyolysis  Right ankle fracture status post open reduction to fixation  Hypertension  Hypothyroidism  Osteoarthritis  Gastroesophageal reflux disease    PLAN  CPM  Postop care  Pain management  Orthopedic surgery input appreciated  Adjust home medications  Stress ulcer DVT prophylaxis  Supportive care  Discussed with family  DNR  PT/OT  Subacute rehab in a.m.    LIZ DUNLAP MD

## 2020-01-22 NOTE — PROGRESS NOTES
Continued Stay Note  [facility]     Patient Name: Manuela Gonzalez  MRN: [id number]  Today's Date: 1/22/2020    Admit Date: 1/14/2020    Discharge Plan     Row Name 01/22/20 1152       Plan    Plan  Plan [facility] for skilled care- awaiting pre cert.  KELI Fischer RN    Patient/Family in Agreement with Plan  yes    Plan Comments  Spoke with pt and pt's daughter  (Marcus) at bedside.  Pt has been accepted at [facility].   Odette  ( [phone]) called to check on pre cert.   Odette to call facility and inform CCP when pre cert obtained.   [employer] W/C van set up to transport at 1700.   Family will pay for cost of transport.  Plan [facility] for skilled care- awaiting pre cert.   KELI Fischer, RN        Discharge Codes    No documentation.             Anny Fischer, RN

## 2020-01-22 NOTE — PLAN OF CARE
Patient alert and oriented. No complaints of pain. Patient resting in bed. With discharge orders. Waiting for pre-cert to continue to with discharge. Vital signs are stable. No acute distress noted. Will continue to monitor.

## 2020-01-22 NOTE — PLAN OF CARE
Problem: Patient Care Overview  Goal: Plan of Care Review  Outcome: Ongoing (interventions implemented as appropriate)  Flowsheets  Taken 1/21/2020 1618 by Cherry Viera PTA  Progress: improving  Taken 1/22/2020 0032 by Odette Gore, RN  Plan of Care Reviewed With: patient;daughter  Taken 1/22/2020 0326 by Odette Gore, RN  Outcome Summary: Patient A&O. No complaints of pain, SOB or nausea. Turn q2 when not refusing. Daughter at bedside at all times. Purewick in place. Cast to RLE. Vital signs stable. No acute distress noted. Will continue to monitor.

## 2020-01-22 NOTE — DISCHARGE SUMMARY
Discharge summary    Date of admission 1/14/2020  Date of discharge 1/22/2020    Final diagnosis  Right ankle fracture status post open reduction to fixation  Status post open reduction to fixation right ankle syndesmosis  Status post traumatic rhabdomyolysis  Hypertension  Hypothyroidism  Osteoarthritis  Gastroesophageal reflux disease    Discharge medications  .  Current Facility-Administered Medications:   •  aspirin EC tablet 325 mg, 325 mg, Oral, Daily, Js Montalvo Jr., MD, 325 mg at 01/22/20 0809  •  HYDROcodone-acetaminophen (NORCO) 5-325 MG per tablet 1 tablet, 1 tablet, Oral, Q6H PRN, Js Montalvo Jr., MD, 1 tablet at 01/21/20 0235  •  levothyroxine (SYNTHROID, LEVOTHROID) tablet 112 mcg, 112 mcg, Oral, QAM AC, Js Montalvo Jr., MD, 112 mcg at 01/22/20 0532  •  metoprolol succinate XL (TOPROL-XL) 24 hr tablet 25 mg, 25 mg, Oral, Daily, Js Montalvo Jr., MD, 25 mg at 01/22/20 0809  •  ondansetron (ZOFRAN) injection 4 mg, 4 mg, Intravenous, Q6H PRN, Js Montalvo Jr., MD  •  pantoprazole (PROTONIX) EC tablet 40 mg, 40 mg, Oral, Q AM, Js Montalvo Jr., MD, 40 mg at 01/22/20 0532  •  [COMPLETED] Insert peripheral IV, , , Once **AND** sodium chloride 0.9 % flush 10 mL, 10 mL, Intravenous, PRN, Js Montalvo Jr., MD, 10 mL at 01/19/20 0817     Consults obtained  Orthopedic surgery    Procedures  Open reduction to fixation right bimalleolar ankle fracture   Open reduction to fixation right ankle syndesmosis    Hospital course  89-year-old white female with history of hypertension hypothyroidism and osteoarthritis who lives by herself brought to the emergency room by the family with right lower extremity pain when she has had unwitnessed fall with back pain and right hip pain ankle pain.  Patient work-up revealed right ankle fracture and traumatic rhabdomyolysis admit for management.  Patient admitted treated with IV fluid her traumatic rhabdomyolysis resolved.  Patient further evaluated by orthopedic surgery  and recommend open reduction to fixation of right ankle fracture.  Patient underwent surgery without complication.  Postoperatively patient doing well but she is nonweightbearing on right lower extremity and will be discharged to subacute rehab.  Patient remained DNR throughout hospital course    Discharge diet regular    Activity per PT OT with nonweightbearing right lower extremity    Medication as above    Further care per accepting physician at subacute rehab    LIZ DUNLAP MD

## 2020-01-22 NOTE — PROGRESS NOTES
"Daily progress note    Chief complaint  Doing better  No new complaints   Family at bedside    History of present illness  89-year-old white female with history of hypertension hypothyroidism and osteoarthritis who lives by herself brought to the emergency room by the family after the unwitnessed fall with back pain right hip pain and right ankle pain.  Patient denies any headache chest pain shortness of breath abdominal pain nausea vomiting diarrhea.  Patient fully alert oriented.  Patient work-up in ER revealed traumatic rhabdomyolysis and right ankle fracture admit for management.     REVIEW OF SYSTEMS  Unremarkable     PHYSICAL EXAM         Blood pressure 123/53, pulse 75, temperature 98 °F (36.7 °C), temperature source Oral, resp. rate 16, height 162.6 cm (64\"), weight 74.2 kg (163 lb 9.3 oz), SpO2 94 %.    Constitutional: She is oriented to person, place, and time. No distress.   Head: Normocephalic and atraumatic.   Eyes: Pupils are equal, round, and reactive to light. EOM are normal.   Neck: Normal range of motion. Neck supple. No muscular tenderness present.   Cardiovascular: Normal rate, regular rhythm and normal heart sounds.   Pulmonary/Chest: Effort normal and breath sounds normal. No respiratory distress.   Abdominal: Soft. There is no tenderness. There is no rebound and no guarding.   Musculoskeletal: She exhibits no edema. Right hip: She exhibits tenderness. Right knee: She exhibits decreased range of motion (secondary to pain) and swelling. Tenderness found. Right ankle: She exhibits swelling. Tenderness. Lumbar back: She exhibits tenderness.   The R leg is shorted and externally rotated.    Neurological: She is alert and oriented to person, place, and time. She has normal sensation and normal strength.   Skin: Skin is warm and dry. Ecchymosis (R lower extremity diffusely) noted. No rash noted.   Psychiatric: Mood and affect normal.     LAB RESULTS  Lab Results (last 24 hours)     ** No results " found for the last 24 hours. **        Imaging Results (Last 24 Hours)     ** No results found for the last 24 hours. **        EKG                              Rhythm/Rate: SR, 85  P waves and WA: normal  QRS, axis: LVH   ST and T waves: nonspecific ST changes       Current Facility-Administered Medications:   •  aspirin EC tablet 325 mg, 325 mg, Oral, Daily, Js Montalvo Jr., MD, 325 mg at 01/22/20 0809  •  HYDROcodone-acetaminophen (NORCO) 5-325 MG per tablet 1 tablet, 1 tablet, Oral, Q6H PRN, Js Montalvo Jr., MD, 1 tablet at 01/21/20 0235  •  levothyroxine (SYNTHROID, LEVOTHROID) tablet 112 mcg, 112 mcg, Oral, QAM AC, Js Montalvo Jr., MD, 112 mcg at 01/22/20 0532  •  metoprolol succinate XL (TOPROL-XL) 24 hr tablet 25 mg, 25 mg, Oral, Daily, Js Montalvo Jr., MD, 25 mg at 01/22/20 0809  •  ondansetron (ZOFRAN) injection 4 mg, 4 mg, Intravenous, Q6H PRN, Js Montalvo Jr., MD  •  pantoprazole (PROTONIX) EC tablet 40 mg, 40 mg, Oral, Q AM, Js Montalvo Jr., MD, 40 mg at 01/22/20 0532  •  [COMPLETED] Insert peripheral IV, , , Once **AND** sodium chloride 0.9 % flush 10 mL, 10 mL, Intravenous, PRN, Js Montalvo Jr., MD, 10 mL at 01/19/20 0817     ASSESSMENT  Acute traumatic rhabdomyolysis resolved  Right ankle fracture status post open reduction to fixation  Hypertension  Hypothyroidism  Osteoarthritis  Gastroesophageal reflux disease    PLAN  Discharge to subacute rehab  Discharge summary dictated    LIZ DUNLAP MD

## 2020-01-22 NOTE — PROGRESS NOTES
Continued Stay Note  [facility]     Patient Name: Manuela Gonzalez  MRN: [id number]  Today's Date: 1/22/2020    Admit Date: 1/14/2020    Discharge Plan     Row Name 01/22/20 1415       Plan    Plan  Plan [facility] for skilled care- pre cert obtained.   KELI Fischer RN    Patient/Family in Agreement with Plan  yes    Plan Comments  Pt with discharge order.   Per Odette  ( [phone]) [facility] has obtained pre cert.  Discharge summary faxed to [facility].  Discharge summary in packet.   Prescription in packet.   Packet to RN.  [employer] [facility] arranged to pick pt up at 1700.   Plan [facility] for skilled care transported per [employer] [facility] [employer].   KELI Fischer RN    Row Name 01/22/20 1462       Plan    Plan  Plan [facility] for skilled care- awaiting pre cert.  KELI Fischer RN    Patient/Family in Agreement with Plan  yes    Plan Comments  Spoke with pt and pt's daughter  (Marcus) at bedside.  Pt has been accepted at [facility].   Odette  ( [phone]) called to check on pre cert.   Odette to call facility and inform CCP when pre cert obtained.   [employer] van set up to transport at 1700.   Family will pay for cost of transport.  Plan [facility] for skilled care- awaiting pre cert.   KELI Fischer RN        Discharge Codes    No documentation.       Expected Discharge Date and Time     Expected Discharge Date Expected Discharge Time    Jan 22, 2020             Anny Fischer RN

## 2020-01-23 NOTE — PROGRESS NOTES
Case Management Discharge Note      Final Note: Pt discharged to [facility] on 1/22.   KELI Fischer RN    Provided post acute provider list?: Yes  Post Acute Provider List: Nursing Home  Post Acute Provider Quality & Resource List: Yes  Delivered To: Patient, Support Person  Method of Delivery: In person    Destination - Selection Complete      Service Provider Request Status Selected Services Address Phone Number Fax Number    [facility] Selected Skilled Nursing [address] [address] [phone]      Durable Medical Equipment      No service has been selected for the patient.      Dialysis/Infusion      No service has been selected for the patient.      Home Medical Care      No service has been selected for the patient.      Therapy      No service has been selected for the patient.      Community Resources      No service has been selected for the patient.        Transportation Services  Private: Car  W/C Van: [facility]    Final Discharge Disposition Code: 03 - skilled nursing facility (SNF)

## 2020-04-02 NOTE — DISCHARGE INSTRUCTIONS
Continue current medications, work with your primary care provider, home physical therapy, and orthopedic doctor moving forward on your hip pain issues, no evidence of any fractures or infection at this time.  Return to the emergency department for worsening symptoms as needed.

## 2020-04-02 NOTE — ED TRIAGE NOTES
Patient to er per ems from home was called to house related to chest pain. Ems found patient c/o of right hip pain while still in bed. Ems noted rotation in right hip. Ems reported family and patient could not state how the patient injured her hip. Patient alert and calm in triage.

## 2020-04-02 NOTE — ED PROVIDER NOTES
EMERGENCY DEPARTMENT ENCOUNTER    Room Number:  38/38  Date of encounter:  4/2/2020  PCP: Val Crawford MD  Historian: Patient, EMS      HPI:  Chief Complaint: Chest pain, right hip pain  A complete HPI/ROS/PMH/PSH/SH/FH are unobtainable due to: None    Context: Manuela Gonzalez is a 89 y.o. female who presents to the ED c/o chest pain that started this morning but on EMS rotation was without chest pain and was complaining only of right hip pain.  Per EMS, family states that they took her out of a nursing facility approximately 1 week ago because they were not happy with her care there.  She has continued to ambulate through that time with a walker.  Has had some complaints of pain through that time.  Denied any known trauma or fall.  However, patient was unable to get out of bed today due to the pain in her right hip.  EMS noted shortening and rotation of the right hip.  Currently denies any chest pain.  Patient denies any falls or trauma.  States that she has been walking with her walker over the past few days.  Denies any current chest pain, shortness of breath, fevers, chills, dental pain, nausea, vomiting, diarrhea.      MEDICAL RECORD REVIEW    Discharge January 22, 2020 from this hospital after being admitted for right ankle fracture    PAST MEDICAL HISTORY  Active Ambulatory Problems     Diagnosis Date Noted   • Traumatic rhabdomyolysis (CMS/[facility]) 01/14/2020   • Closed bimalleolar fracture of right ankle 01/14/2020     Resolved Ambulatory Problems     Diagnosis Date Noted   • No Resolved Ambulatory Problems     Past Medical History:   Diagnosis Date   • Hypertension    • Macular degeneration          PAST SURGICAL HISTORY  Past Surgical History:   Procedure Laterality Date   • ANKLE OPEN REDUCTION INTERNAL FIXATION Right 1/17/2020    Procedure: BIMALLEOLAR FRACTURE OPEN REDUCTION INTERNAL FIXATION;  Surgeon: Js Montalvo Jr., MD;  Location: [facility] OR;  Service: Orthopedics   • APPENDECTOMY     •  DIAGNOSTIC LAPAROSCOPY      for ovarian cyst, when patient was 15 years old         FAMILY HISTORY  History reviewed. No pertinent family history.      SOCIAL HISTORY  Social History     Socioeconomic History   • Marital status:      Spouse name: Not on file   • Number of children: Not on file   • Years of education: Not on file   • Highest education level: Not on file   Tobacco Use   • Smoking status: Never Smoker   • Smokeless tobacco: Never Used   Substance and Sexual Activity   • Alcohol use: Not Currently   • Drug use: Never   • Sexual activity: Defer         ALLERGIES  Penicillins        REVIEW OF SYSTEMS  Review of Systems     All systems reviewed and negative except for those discussed in HPI.       PHYSICAL EXAM    I have reviewed the triage vital signs and nursing notes.    ED Triage Vitals   Temp Pulse Resp BP SpO2   -- -- -- -- --      Temp src Heart Rate Source Patient Position BP Location FiO2 (%)   -- -- -- -- --       Physical Exam  General: Awake, alert, no acute distress, nondiaphoretic  HEENT: Mucous membranes moist, atraumatic, normocephalic, EOMI  Neck: Full ROM  Pulm: Symmetric, non-labored, lungs CTAB  Cardiovascular: Regular rate and rhythm, normal S1/S2, intact distal pulses  GI: Soft, non-tender, non-distended, no rebound, no guarding, bowel sounds present  MSK: Slightly decreased range of motion of the right hip due to pain but no obvious shortening or malrotation on exam with intact DP pulses bilaterally  Skin: Warm, dry  Neuro: Alert and oriented x 3, GCS 15, moving all extremities, no focal deficits  Psych: Calm, cooperative      Surgical mask and gloves used during this encounter.     LAB RESULTS  Recent Results (from the past 24 hour(s))   Urinalysis With Microscopic If Indicated (No Culture) - Urine, Catheter    Collection Time: 04/02/20 11:37 AM   Result Value Ref Range    Color, UA Yellow Yellow, Straw    Appearance, UA Clear Clear    pH, UA 7.0 5.0 - 8.0    Specific  Gravity, UA 1.007 1.005 - 1.030    Glucose, UA Negative Negative    Ketones, UA Negative Negative    Bilirubin, UA Negative Negative    Blood, UA Negative Negative    Protein, UA Negative Negative    Leuk Esterase, UA Negative Negative    Nitrite, UA Negative Negative    Urobilinogen, UA 0.2 E.U./dL 0.2 - 1.0 E.U./dL   Comprehensive Metabolic Panel    Collection Time: 04/02/20 11:53 AM   Result Value Ref Range    Glucose 91 65 - 99 mg/dL    BUN 12 8 - 23 mg/dL    Creatinine 0.84 0.57 - 1.00 mg/dL    Sodium 142 136 - 145 mmol/L    Potassium 4.6 3.5 - 5.2 mmol/L    Chloride 103 98 - 107 mmol/L    CO2 28.8 22.0 - 29.0 mmol/L    Calcium 8.6 8.2 - 9.6 mg/dL    Total Protein 6.1 6.0 - 8.5 g/dL    Albumin 3.20 (L) 3.50 - 5.20 g/dL    ALT (SGPT) 5 1 - 33 U/L    AST (SGOT) 14 1 - 32 U/L    Alkaline Phosphatase 77 39 - 117 U/L    Total Bilirubin 0.3 0.2 - 1.2 mg/dL    eGFR Non African Amer 63 >60 mL/min/1.73    Globulin 2.9 gm/dL    A/G Ratio 1.1 g/dL    BUN/Creatinine Ratio 14.3 7.0 - 25.0    Anion Gap 10.2 5.0 - 15.0 mmol/L   Troponin    Collection Time: 04/02/20 11:53 AM   Result Value Ref Range    Troponin T <0.010 0.000 - 0.030 ng/mL   Magnesium    Collection Time: 04/02/20 11:53 AM   Result Value Ref Range    Magnesium 1.9 1.7 - 2.3 mg/dL   CBC Auto Differential    Collection Time: 04/02/20 11:53 AM   Result Value Ref Range    WBC 7.66 3.40 - 10.80 10*3/mm3    RBC 3.81 3.77 - 5.28 10*6/mm3    Hemoglobin 11.2 (L) 12.0 - 15.9 g/dL    Hematocrit 34.6 34.0 - 46.6 %    MCV 90.8 79.0 - 97.0 fL    MCH 29.4 26.6 - 33.0 pg    MCHC 32.4 31.5 - 35.7 g/dL    RDW 13.6 12.3 - 15.4 %    RDW-SD 45.8 37.0 - 54.0 fl    MPV 9.0 6.0 - 12.0 fL    Platelets 523 (H) 140 - 450 10*3/mm3    Neutrophil % 60.8 42.7 - 76.0 %    Lymphocyte % 22.5 19.6 - 45.3 %    Monocyte % 6.0 5.0 - 12.0 %    Eosinophil % 9.5 (H) 0.3 - 6.2 %    Basophil % 0.8 0.0 - 1.5 %    Immature Grans % 0.4 0.0 - 0.5 %    Neutrophils, Absolute 4.66 1.70 - 7.00 10*3/mm3     Lymphocytes, Absolute 1.72 0.70 - 3.10 10*3/mm3    Monocytes, Absolute 0.46 0.10 - 0.90 10*3/mm3    Eosinophils, Absolute 0.73 (H) 0.00 - 0.40 10*3/mm3    Basophils, Absolute 0.06 0.00 - 0.20 10*3/mm3    Immature Grans, Absolute 0.03 0.00 - 0.05 10*3/mm3    nRBC 0.0 0.0 - 0.2 /100 WBC       Ordered the above labs and independently reviewed the results.        RADIOLOGY  Ct Pelvis Without Contrast    Result Date: 4/2/2020  PELVIS CT WITHOUT CONTRAST  HISTORY: Right hip pain. No specific trauma. Difficulty bearing weight.  TECHNIQUE: Axial CT of the pelvis with multiplanar reformatted images is provided and is correlated with a CT scan from 01/14/2020.  Radiation dose reduction techniques were utilized, including automated exposure control and exposure modulation based on body size.  FINDINGS: There is a small volume of intrapelvic free fluid. Colonic diverticulosis is observed. No intrapelvic lesion is identified. No hematoma is present around either hip or the pelvis. No fracture is identified in the proximal femurs, the pelvis, or the visualized lower lumbar spine. No periosteal edema or hematoma is present. Hip abductor musculature is atrophic as before. No acute muscle deformity is evident. There is no joint effusion. Osteoarthritic change is observed at the hips.      Degenerative change at the hips and the spine. No acute abnormality is identified. I discussed the case with Dr. Vu of the emergency department at 3 PM.  This report was finalized on 4/2/2020 3:45 PM by Dr. Js Stark M.D.      Xr Chest 1 View    Result Date: 4/2/2020  PORTABLE CHEST X-RAY  HISTORY cough and chest pain and hypertension.  TECHNIQUE: Portable chest x-ray is provided and correlated with chest x-ray from 01/14/2020.  FINDINGS: The cardiomediastinal silhouette is normal given the apical lordotic projection. Vascular volume is normal and the lungs are clear. The right cusp in a sulcus is slightly blunted, suggesting that there is  a small right pleural effusion. No effusion is evident on the left. There is no pneumothorax. Osteoarthritic changes are observed shoulders.      Tiny right pleural effusion. Otherwise negative chest x-ray.  This report was finalized on 4/2/2020 12:46 PM by Dr. Js Stark M.D.      Xr Hip With Or Without Pelvis 2 - 3 View Right    Result Date: 4/2/2020  TWO-VIEW RIGHT HIP AND ONE VIEW AP PELVIS  HISTORY: Recent fall. Hip pain.  There is moderate diffuse osteoporosis. There are mild-to-moderate degenerative changes involving both hips symmetrically and the overall appearance shows no change from 01/14/2020. No acute fracture is seen.  This report was finalized on 4/2/2020 1:26 PM by Dr. Alex Braun M.D.        I ordered the above noted radiological studies. Reviewed by me, discussed with radiologist.  See dictation for official radiology interpretation.      PROCEDURES    Procedures  EKG    EKG Time: 1142  Rhythm/Rate: Sinus rhythm rate of 72  Left axis deviation  Nml intervals   No acute ischemic changes  No STEMI     Interpreted Contemporaneously by me, independently viewed  No emergent changes as compared to January 2020      MEDICATIONS GIVEN IN ER    Medications   HYDROcodone-acetaminophen (NORCO) 5-325 MG per tablet 1 tablet (1 tablet Oral Given 4/2/20 1547)         PROGRESS, DATA ANALYSIS, CONSULTS, AND MEDICAL DECISION MAKING    All labs have been independently reviewed by me.  All radiology studies have been reviewed by me and discussed with radiologist dictating the report.   EKG's independently viewed and interpreted by me.  Discussion below represents my analysis of pertinent findings related to patient's condition, differential diagnosis, treatment plan and final disposition.        ED Course as of Apr 02 1607   Thu Apr 02, 2020   1507 Spoke with Dr. Stark (Radiology), no evidence of any occult fractures on CT pelvis.    [DC]   1538 Spoke with patient's daughter prior to obtaining CT Pelvis,  she is comfortable taking the patient back at home and dealing with the pain issues there if there is no evidence of any acute abnormalities on the CT of the patient's pelvis.  They do already have home physical therapy set up.     [DC]   1538 CT scan came back without any evidence of acute occult fractures, and at this point we have rediscussed the case with her daughter and she is comfortable taking the patient back at home for now.  Patient will be given a Norco prior to discharge which, the daughter states that the patient has been getting at nighttime with some relief of pain.    [DC]   1539 Unclear etiology of pain at this point, no evidence of any infectious process, I suspect probably arthritic type pain with possibly some muscle strain as the most likely source, advised continuing working with home physical therapy and primary care physician and orthopedics and returning to the emergency department for worsening symptoms as needed.    [DC]   1539 Of note, patient not really able to bear weight or ambulate here, this has been discussed with the daughter and she is comfortable and capable, she states, of taking care of the patient in this state at this time.    [DC]      ED Course User Index  [DC] Honorio Vu MD       AS OF 16:07 VITALS:    BP - 164/82  HR - 69  TEMP - 98.7 °F (37.1 °C) (Tympanic)  02 SATS - 96%        DIAGNOSIS  Final diagnoses:   Right hip pain   Osteoarthritis of right hip, unspecified osteoarthritis type         DISPOSITION  DISCHARGE    Patient discharged in stable condition.    Reviewed implications of results, diagnosis, meds, responsibility to follow up, warning signs and symptoms of possible worsening, potential complications and reasons to return to ER.    Patient/Family voiced understanding of above instructions.    Discussed plan for discharge, as there is no emergent indication for admission. Patient referred to primary care provider for BP management due to today's BP.  Pt/family is agreeable and understands need for follow up and repeat testing.  Pt is aware that discharge does not mean that nothing is wrong but it indicates no emergency is present that requires admission and they must continue care with follow-up as given below or physician of their choice.     FOLLOW-UP  [facility] Emergency Department  [address] [facility] [address] [phone]    As needed, If symptoms worsen    Val Crawford MD  [address] [id number] [phone]    Schedule an appointment as soon as possible for a visit   As needed         Medication List      No changes were made to your prescriptions during this visit.                  Honorio Vu MD  04/02/20 4142

## 2020-04-05 PROBLEM — R10.31 RIGHT GROIN PAIN: Status: ACTIVE | Noted: 2020-01-01

## 2020-04-05 NOTE — ED PROVIDER NOTES
Pt presents to ED c/o R groin pain. History is limited due to pt moaning in pain and being uncooperative.    Upon exam, pt is moaning in pain. Her heart is RRR without murmur, lungs are CTAB, she has normal DP and PT pulses bilaterally, and there is tenderness to the R hip. Pt is without focal neurologic deficit.    Discussed with pt plan to obtain results of labs and imaging prior to disposition. Pt understands and agrees with the plan, all questions answered.    Patient does not present with complaints for COVID19. However, my scribe and I were both wearing masks throughout any patient interaction.     MD ATTESTATION NOTE    The NATALIE and I have discussed this patient's history, physical exam, and treatment plan.  I have reviewed the documentation and personally had a face to face interaction with the patient. I affirm the documentation and agree with the treatment and plan.  The attached note describes my personal findings.    Documentation assistance provided by jr Andres for Dr. Trejo.  Information recorded by the scribe was done at my direction and has been verified and validated by me.     Ese Andres  04/05/20 0434       Ozzie Trejo MD  04/05/20 8811

## 2020-04-05 NOTE — PLAN OF CARE
Problem: Patient Care Overview  Goal: Plan of Care Review  Outcome: Ongoing (interventions implemented as appropriate)  Flowsheets (Taken 4/5/2020 7734)  Progress: no change  Plan of Care Reviewed With: patient  Outcome Summary: Patient c/o 10/10 right groin pain with movement, medicating with 2mg Morphine Q2H PRN, resting comfortably between care. XR right knee & MRI right hip done this AM. Consults placed for Ortho & ID. Turn Q2H. Purewick in place. VSS.

## 2020-04-05 NOTE — PROGRESS NOTES
Discharge Planning Assessment  [facility]     Patient Name: Manuela Gonzlaez  MRN: [id number]  Today's Date: 4/5/2020    Admit Date: 4/5/2020    Discharge Needs Assessment     Row Name 04/05/20 1000       Living Environment    Lives With  child(daria), adult    Name(s) of Who Lives With Patient  daughter Seda states pt has been staying with her since DC from rehab a couple months ago    Current Living Arrangements  home/apartment/condo single story home    Primary Care Provided by  child(daria)    Provides Primary Care For  no one, unable/limited ability to care for self    Family Caregiver if Needed  child(daria), adult    Family Caregiver Names  daughter Seda Payne [phone]    Quality of Family Relationships  helpful;involved;supportive    Able to Return to Prior Arrangements  other (see comments)    Living Arrangement Comments  daughter is not sure if pt will be able to return home with [facility] to follow or if she will need SNF at [facility]       Resource/Environmental Concerns    Resource/Environmental Concerns  none    Transportation Concerns  car, none       Transition Planning    Patient/Family Anticipates Transition to  home with family;home with help/services;inpatient rehabilitation facility    Patient/Family Anticipated Services at Transition  rehabilitation services;home health care    Transportation Anticipated  family or friend will provide       Discharge Needs Assessment    Readmission Within the Last 30 Days  no previous admission in last 30 days    Concerns to be Addressed  discharge planning    Equipment Currently Used at Home  walker, rolling;wheelchair;grab bar;bath bench    Anticipated Changes Related to Illness  none    Equipment Needed After Discharge  none    Outpatient/Agency/Support Group Needs  homecare agency;skilled nursing facility pt was recently at [facility] and used [facility].     Discharge Facility/Level of Care Needs  home with home health;nursing facility, skilled    Provided Post  Acute Provider List?  Yes    Post Acute Provider List  Nursing Home    Delivered To  Support Person    Support Person  daughter Seda Payne    Method of Delivery  Telephone daughter provided with website information for [facility] and Medicare.gov compare for dtr to review should the need arise    Current Discharge Risk  chronically ill;dependent with mobility/activities of daily living;cognitively impaired;physical impairment        Discharge Plan     Row Name 04/05/20 1007       Plan    Plan  home with [facility] vs SNF    Provided Post Acute Provider Quality & Resource List?  Yes    Post Acute Provider Quality and Resource List  Nursing Home    Patient/Family in Agreement with Plan  yes spoke with daughter Seda on the phone    Plan Comments  Introduced self/explained role of CCP. Face sheet data reviewed. CMS RAE letter completed. Address on face sheet is pt's home address. Pt has been staying with her daughter Seda in [place] since [place] from rehab a couple months ago. Confirmed with dtr PCP and pharmacy. Pt has her meds mailed to her. Pt no longer drives. Daughter usually assists pt with ADLs including bathing/dressing. She uses a roll walker and WC at home. There is a bath bench in the bathroom and grab bars for safety. Pt used [facility] for PT at home and dtr states if pt needs HH again they would use [facility] again. Pt was recently at [facility] for rehab. Per daughter, the therapy there was good but other aspects of the stay were not the best. Dtr chose that location b/c she could check on her mom before and after work. Now since she is working from home she would likely want a place closer to her home in [facility]. Daughter provided with the website for [facility] and Medicare.gov. Dtr agreeable to referral to [facility] at this time and she will follow up with CCP after plan of care established for pt as to SNF vs home with HH. Family should be able to drive pt at [place]. CCP to  follow.................JW        Destination      Coordination has not been started for this encounter.      Durable Medical Equipment      Coordination has not been started for this encounter.      Dialysis/Infusion      Coordination has not been started for this encounter.      Home Medical Care      Coordination has not been started for this encounter.      Therapy      Coordination has not been started for this encounter.      Community Resources      Coordination has not been started for this encounter.          Demographic Summary     Row Name 04/05/20 0959       General Information    Admission Type  observation    Arrived From  home    Required Notices Provided  Observation Status Notice    Referral Source  admission list    Reason for Consult  discharge planning    Preferred Language  English     Used During This Interaction  no       Contact Information    Permission Granted to Share Info With  ;family/designee        Functional Status     Row Name 04/05/20 0959       Functional Status    Usual Activity Tolerance  fair    Current Activity Tolerance  poor       Functional Status, IADL    Medications  assistive person    Meal Preparation  assistive person    Housekeeping  assistive person    Laundry  assistive person    Shopping  assistive person        Psychosocial    No documentation.       Abuse/Neglect    No documentation.       Legal    No documentation.       Substance Abuse    No documentation.       Patient Forms     Row Name 04/05/20 0958       Patient Forms    Patient Observation Letter  Delivered    Delivered to  Support person    Method of delivery  Telephone copy emailed to daughter Seda Payne at [email or web address] as requested            Jacqueline Payne RN

## 2020-04-05 NOTE — H&P
"History and physical    Primary care physician  Dr. LOPEZ    Chief complaint  Right hip and groin pain    History of present illness  89-year-old white female with history of hypertension macular degeneration and severe osteoarthritis presented to [facility] emergency room with right groin and right hip pain.  Patient has been evaluated in ER which showed no evidence of any fracture but she is in a lot of pain admit for further management.  Patient is poor historian but she is in a lot of pain crying admit for management.  No recent fall trauma injury per nursing staff.  Also no fever chills night sweats weight loss or weight gain.    PAST MEDICAL HISTORY  • Hypertension     • Macular degeneration        PAST SURGICAL HISTORY  Surgical History         Past Surgical History:   Procedure Laterality Date   • ANKLE OPEN REDUCTION INTERNAL FIXATION Right 1/17/2020     Procedure: BIMALLEOLAR FRACTURE OPEN REDUCTION INTERNAL FIXATION;  Surgeon: Js Montalvo Jr., MD;  Location: [facility];  Service: Orthopedics   • APPENDECTOMY       • DIAGNOSTIC LAPAROSCOPY         for ovarian cyst, when patient was 15 years old         FAMILY HISTORY  History reviewed. No pertinent family history.     SOCIAL HISTORY  Social History   Social History            Socioeconomic History   • Marital status:        Spouse name: Not on file   • Number of children: Not on file   • Years of education: Not on file   • Highest education level: Not on file   Tobacco Use   • Smoking status: Never Smoker   • Smokeless tobacco: Never Used   Substance and Sexual Activity   • Alcohol use: Not Currently   • Drug use: Never   • Sexual activity: Defer         ALLERGIES  Penicillins   Home medications reviewed     REVIEW OF SYSTEMS  Unable to perform ROS: Other      PHYSICAL EXAM  Blood pressure 164/64, pulse 66, temperature 97 °F (36.1 °C), temperature source Oral, resp. rate 20, height 160 cm (63\"), weight 75.4 kg (166 lb 3.2 oz), " SpO2 100 %, not currently breastfeeding.    Constitutional: She is well-developed, well-nourished, and in no distress.   Head: Normocephalic.   Mouth/Throat: Mucous membranes are normal.   Eyes: No scleral icterus.   Neck: Normal range of motion.   Cardiovascular: Normal rate, regular rhythm and normal heart sounds.   Pulses:Dorsalis pedis pulses are 2+ on the right side, and 2+ on the left side. Posterior tibial pulses are 2+ on the right side, and 2+ on the left side.   Pulmonary/Chest: Effort normal and breath sounds normal.   Musculoskeletal: Normal range of motion. No erythema or signs of infection noted to right groin   Neurological: She is alert.   Skin: Skin is warm and dry.   Psychiatric: Mood and affect normal.     LAB RESULTS  Lab Results (last 24 hours)     Procedure Component Value Units Date/Time    Urinalysis With Microscopic If Indicated (No Culture) - Urine, Catheter [[id number]]  (Abnormal) Collected:  04/05/20 0507    Specimen:  Urine, Catheter Updated:  04/05/20 0514     Color, UA Yellow     Appearance, UA Clear     pH, UA 6.5     Specific Gravity, UA >=1.030     Glucose, UA Negative     Ketones, UA Trace     Bilirubin, UA Negative     Blood, UA Negative     Protein, UA Negative     Leuk Esterase, UA Negative     Nitrite, UA Negative     Urobilinogen, UA 1.0 E.U./dL    Narrative:       Urine microscopic not indicated.    Sedimentation Rate [[id number]]  (Normal) Collected:  04/05/20 0326    Specimen:  Blood Updated:  04/05/20 0444     Sed Rate 10 mm/hr     C-reactive Protein [[id number]]  (Abnormal) Collected:  04/05/20 0326    Specimen:  Blood Updated:  04/05/20 0433     C-Reactive Protein 0.92 mg/dL     Comprehensive Metabolic Panel [[id number]]  (Abnormal) Collected:  04/05/20 0326    Specimen:  Blood Updated:  04/05/20 0410     Glucose 119 mg/dL      BUN 10 mg/dL      Creatinine 0.82 mg/dL      Sodium 138 mmol/L      Potassium 3.9 mmol/L      Chloride 99 mmol/L      CO2 25.4 mmol/L      Calcium  9.0 mg/dL      Total Protein 5.9 g/dL      Albumin 3.30 g/dL      ALT (SGPT) 5 U/L      AST (SGOT) 15 U/L      Alkaline Phosphatase 75 U/L      Total Bilirubin 0.5 mg/dL      eGFR Non African Amer 65 mL/min/1.73      Globulin 2.6 gm/dL      A/G Ratio 1.3 g/dL      BUN/Creatinine Ratio 12.2     Anion Gap 13.6 mmol/L     Narrative:       GFR Normal >60  Chronic Kidney Disease <60  Kidney Failure <15      CBC & Differential [[id number]] Collected:  04/05/20 0326    Specimen:  Blood Updated:  04/05/20 0343    Narrative:       The following orders were created for panel order CBC & Differential.  Procedure                               Abnormality         Status                     ---------                               -----------         ------                     CBC Auto Differential[[id number]]        Abnormal            Final result                 Please view results for these tests on the individual orders.    CBC Auto Differential [[id number]]  (Abnormal) Collected:  04/05/20 0326    Specimen:  Blood Updated:  04/05/20 0343     WBC 13.95 10*3/mm3      RBC 3.63 10*6/mm3      Hemoglobin 10.5 g/dL      Hematocrit 32.8 %      MCV 90.4 fL      MCH 28.9 pg      MCHC 32.0 g/dL      RDW 13.6 %      RDW-SD 45.0 fl      MPV 9.1 fL      Platelets 544 10*3/mm3      Neutrophil % 76.6 %      Lymphocyte % 13.4 %      Monocyte % 5.8 %      Eosinophil % 3.2 %      Basophil % 0.5 %      Immature Grans % 0.5 %      Neutrophils, Absolute 10.68 10*3/mm3      Lymphocytes, Absolute 1.87 10*3/mm3      Monocytes, Absolute 0.81 10*3/mm3      Eosinophils, Absolute 0.45 10*3/mm3      Basophils, Absolute 0.07 10*3/mm3      Immature Grans, Absolute 0.07 10*3/mm3      nRBC 0.0 /100 WBC         Imaging Results (Last 24 Hours)     Procedure Component Value Units Date/Time    MRI Hip Right Without Contrast [[id number]] Collected:  04/05/20 1015     Updated:  04/05/20 1100    Narrative:       MR SCAN OF THE PELVIS AND RIGHT HIP     HISTORY: Trauma.  Severe right hip pain.     FINDINGS:  The MR scan was performed with sagittal and axial images and  compared to recent plain films as well as a CT scan of the pelvis  performed 3 days ago. The following findings are present:  1. Although no acute right hip fracture is identified, there is now  considerable edematous change in the soft tissues surrounding the right  hip that particularly extends anteriorly and is associated with a  heterogeneous hematoma located anteromedial to the femoral neck. This is  new since the recent CT scan and the hematoma measures approximately 4.5  cm in AP diameter, 5.4 cm in transverse diameter, and 9.4 cm in  craniocaudal extent. There is also a larger area of surrounding soft  tissue edema in the fat planes surrounding the muscle groups.  2. There is a tiny right-sided joint effusion. There is also a small  joint effusion involving the left hip.  3. The deeper soft tissue structures of the pelvis are unremarkable  except for a tiny amount of free fluid in the presacral space.     This report was finalized on 4/5/2020 10:57 AM by Dr. Alex Braun M.D.       XR Knee 1 or 2 View Right [[id number]] Collected:  04/05/20 0740     Updated:  04/05/20 0746    Narrative:       TWO-VIEW PORTABLE RIGHT KNEE     HISTORY: Fell. Pain.     FINDINGS: There are extremely severe to changes throughout the knee with  marked joint space narrowing and articular irregularity and spurring,  particularly involving the medial compartment. This actually shows  further worsening when compared to the study of 01/14/2020. A very small  joint effusion is present. No fracture is identified. If symptoms  persist, further evaluation with CT scan or MRI scan may be helpful.     This report was finalized on 4/5/2020 7:43 AM by Dr. Alex Braun M.D.       XR Hip With or Without Pelvis 2 - 3 View Right [[id number]] Collected:  04/05/20 0400     Updated:  04/05/20 0543    Narrative:       THREE-VIEW RIGHT HIP        COMPARISON: 04/02/2020, PLAIN FILMS AND NONCONTRAST CT     HISTORY: pain right groin     FINDINGS: 3 views of the right hip were obtained. There is no fracture  or dislocation.  Very minor arthritic changes present. Generalized  osteopenia. Vascular calculi noted.             Impression:       1. No acute osseous abnormality.     This report was finalized on 4/5/2020 5:40 AM by Kevin Yarbrough M.D.           Current Facility-Administered Medications:   •  aspirin chewable tablet 81 mg, 81 mg, Oral, Daily, Liz Dunlap MD  •  gabapentin (NEURONTIN) capsule 800 mg, 800 mg, Oral, Q8H, Liz Dunlap MD  •  levothyroxine (SYNTHROID, LEVOTHROID) tablet 112 mcg, 112 mcg, Oral, QAM AC, Liz Dunlap MD  •  metoprolol succinate XL (TOPROL-XL) 24 hr tablet 25 mg, 25 mg, Oral, Daily, Liz Dunlap MD  •  morphine injection 1 mg, 1 mg, Intravenous, Q2H PRN **OR** morphine injection 2 mg, 2 mg, Intravenous, Q2H PRN, Liz Dunlap MD, 2 mg at 04/05/20 1126  •  ondansetron (ZOFRAN) injection 4 mg, 4 mg, Intravenous, Q6H PRN, Liz Dunlap MD  •  [START ON 4/6/2020] pantoprazole (PROTONIX) EC tablet 40 mg, 40 mg, Oral, Q AM, Liz Dunlap MD     ASSESSMENT  Right hip pain with edema and inflammation probably hematoma versus soft tissue injury  Hypertension  Osteoarthritis  Macular degeneration  Dementia  Gastroesophageal reflux disease    PLAN  Admit  Bedrest  Pain management  Orthopedic surgery consult  Continue home medications  Stress ulcer DVT prophylaxis  Supportive care  PT OT  DNR  Follow closely further recommendation current hospital course    LIZ DUNLAP MD

## 2020-04-05 NOTE — PLAN OF CARE
Problem: Patient Care Overview  Goal: Plan of Care Review  Outcome: Ongoing (interventions implemented as appropriate)  Flowsheets (Taken 4/5/2020 6514)  Plan of Care Reviewed With: patient  Outcome Summary: New admission from ED with c/o severe right groin pain especially on movement. Found groin swelling, RLE larger than left. Alert , oriented on admission, hard of hearing. Dentures and hearing aids left at home. Wound at 2nd toe right with calloused bilateral plantar area.  Slight rash under right breast.  Pure wick attached to LWS. Accumax in place.  For consult to Dr. Louise.

## 2020-04-05 NOTE — ED PROVIDER NOTES
EMERGENCY DEPARTMENT ENCOUNTER    CHIEF COMPLAINT  Chief Complaint: Right groin  History given by: EMS  History limited by: Patient only moaning out in pain  Room Number: 04/04  PMD: Val Crawford MD      HPI:  Pt is a 89 y.o. female who presents with complaint of right groin pain.  Per EMS they picked patient up at home where she was complaining of pain in her right groin.  Unable to obtain any history from patient because she is only moaning out in pain.  Past Medical History of hypertension    Duration: Unable to assess  Timing: Unable to assess  Location: Patient is holding her right groin  Radiation: Unable to assess  Quality: Pain  Intensity/Severity: Moderate  Progression: Unable to assess  Associated Symptoms: Unable to assess  Aggravating Factors: Any movement  Alleviating Factors: Unable to assess  Previous Episodes: Patient was seen here 3 days ago  Treatment before arrival: EMS transport    PAST MEDICAL HISTORY  Active Ambulatory Problems     Diagnosis Date Noted   • Traumatic rhabdomyolysis (CMS/HCC) 01/14/2020   • Closed bimalleolar fracture of right ankle 01/14/2020     Resolved Ambulatory Problems     Diagnosis Date Noted   • No Resolved Ambulatory Problems     Past Medical History:   Diagnosis Date   • Hypertension    • Macular degeneration        PAST SURGICAL HISTORY  Past Surgical History:   Procedure Laterality Date   • ANKLE OPEN REDUCTION INTERNAL FIXATION Right 1/17/2020    Procedure: BIMALLEOLAR FRACTURE OPEN REDUCTION INTERNAL FIXATION;  Surgeon: Js Montalvo Jr., MD;  Location: [facility];  Service: Orthopedics   • APPENDECTOMY     • DIAGNOSTIC LAPAROSCOPY      for ovarian cyst, when patient was 15 years old       FAMILY HISTORY  History reviewed. No pertinent family history.    SOCIAL HISTORY  Social History     Socioeconomic History   • Marital status:      Spouse name: Not on file   • Number of children: Not on file   • Years of education: Not on file   • Highest  education level: Not on file   Tobacco Use   • Smoking status: Never Smoker   • Smokeless tobacco: Never Used   Substance and Sexual Activity   • Alcohol use: Not Currently   • Drug use: Never   • Sexual activity: Defer         ALLERGIES  Penicillins    REVIEW OF SYSTEMS  Review of Systems   Unable to perform ROS: Other       PHYSICAL EXAM  ED Triage Vitals   Temp Heart Rate Resp BP SpO2   04/05/20 0313 04/05/20 0307 04/05/20 0307 04/05/20 0307 04/05/20 0307   99.5 °F (37.5 °C) 82 20 150/77 94 %       Physical Exam   Constitutional: She is well-developed, well-nourished, and in no distress.   HENT:   Head: Normocephalic.   Mouth/Throat: Mucous membranes are normal.   Eyes: No scleral icterus.   Neck: Normal range of motion.   Cardiovascular: Normal rate, regular rhythm and normal heart sounds.   Pulses:       Dorsalis pedis pulses are 2+ on the right side, and 2+ on the left side.        Posterior tibial pulses are 2+ on the right side, and 2+ on the left side.   Pulmonary/Chest: Effort normal and breath sounds normal.   Musculoskeletal: Normal range of motion.        Legs:       Feet:    No erythema or signs of infection noted to right groin   Neurological: She is alert.   Skin: Skin is warm and dry.   Psychiatric: Mood and affect normal.   Nursing note and vitals reviewed.      LAB RESULTS  Recent Results (from the past 24 hour(s))   Comprehensive Metabolic Panel    Collection Time: 04/05/20  3:26 AM   Result Value Ref Range    Glucose 119 (H) 65 - 99 mg/dL    BUN 10 8 - 23 mg/dL    Creatinine 0.82 0.57 - 1.00 mg/dL    Sodium 138 136 - 145 mmol/L    Potassium 3.9 3.5 - 5.2 mmol/L    Chloride 99 98 - 107 mmol/L    CO2 25.4 22.0 - 29.0 mmol/L    Calcium 9.0 8.2 - 9.6 mg/dL    Total Protein 5.9 (L) 6.0 - 8.5 g/dL    Albumin 3.30 (L) 3.50 - 5.20 g/dL    ALT (SGPT) 5 1 - 33 U/L    AST (SGOT) 15 1 - 32 U/L    Alkaline Phosphatase 75 39 - 117 U/L    Total Bilirubin 0.5 0.2 - 1.2 mg/dL    eGFR Non African Amer 65 >60  mL/min/1.73    Globulin 2.6 gm/dL    A/G Ratio 1.3 g/dL    BUN/Creatinine Ratio 12.2 7.0 - 25.0    Anion Gap 13.6 5.0 - 15.0 mmol/L   CBC Auto Differential    Collection Time: 04/05/20  3:26 AM   Result Value Ref Range    WBC 13.95 (H) 3.40 - 10.80 10*3/mm3    RBC 3.63 (L) 3.77 - 5.28 10*6/mm3    Hemoglobin 10.5 (L) 12.0 - 15.9 g/dL    Hematocrit 32.8 (L) 34.0 - 46.6 %    MCV 90.4 79.0 - 97.0 fL    MCH 28.9 26.6 - 33.0 pg    MCHC 32.0 31.5 - 35.7 g/dL    RDW 13.6 12.3 - 15.4 %    RDW-SD 45.0 37.0 - 54.0 fl    MPV 9.1 6.0 - 12.0 fL    Platelets 544 (H) 140 - 450 10*3/mm3    Neutrophil % 76.6 (H) 42.7 - 76.0 %    Lymphocyte % 13.4 (L) 19.6 - 45.3 %    Monocyte % 5.8 5.0 - 12.0 %    Eosinophil % 3.2 0.3 - 6.2 %    Basophil % 0.5 0.0 - 1.5 %    Immature Grans % 0.5 0.0 - 0.5 %    Neutrophils, Absolute 10.68 (H) 1.70 - 7.00 10*3/mm3    Lymphocytes, Absolute 1.87 0.70 - 3.10 10*3/mm3    Monocytes, Absolute 0.81 0.10 - 0.90 10*3/mm3    Eosinophils, Absolute 0.45 (H) 0.00 - 0.40 10*3/mm3    Basophils, Absolute 0.07 0.00 - 0.20 10*3/mm3    Immature Grans, Absolute 0.07 (H) 0.00 - 0.05 10*3/mm3    nRBC 0.0 0.0 - 0.2 /100 WBC   C-reactive Protein    Collection Time: 04/05/20  3:26 AM   Result Value Ref Range    C-Reactive Protein 0.92 (H) 0.00 - 0.50 mg/dL   Sedimentation Rate    Collection Time: 04/05/20  3:26 AM   Result Value Ref Range    Sed Rate 10 0 - 30 mm/hr       I ordered the above labs and reviewed the results    RADIOLOGY  XR Hip With or Without Pelvis 2 - 3 View Right   Preliminary Result   1. No acute osseous abnormality.                  I ordered the above noted radiological studies and reviewed the images on the PACS system.        MEDICAL RECORD REVIEW    PELVIS CT WITHOUT CONTRAST     HISTORY: Right hip pain. No specific trauma. Difficulty bearing weight.        FINDINGS: There is a small volume of intrapelvic free fluid. Colonic  diverticulosis is observed. No intrapelvic lesion is identified. No  hematoma  is present around either hip or the pelvis. No fracture is  identified in the proximal femurs, the pelvis, or the visualized lower  lumbar spine. No periosteal edema or hematoma is present. Hip abductor  musculature is atrophic as before. No acute muscle deformity is evident.  There is no joint effusion. Osteoarthritic change is observed at the  hips.     IMPRESSION:  Degenerative change at the hips and the spine. No acute  abnormality is identified. I discussed the case with Dr. Vu of the  emergency department at 3 PM.     This report was finalized on 4/2/2020 3:45 PM by Dr. Js Stark M.D.          PROGRESS AND CONSULTS     Patient was placed in face mask in first look. Patient was wearing facemask when I entered the room and throughout our encounter. I wore full protective equipment throughout this patient encounter including a face mask, eye shield and gloves. Hand hygiene was performed before donning protective equipment and after removal when leaving the room.    0440:Reviewed pt's history and workup with Dr. Trejo.  At bedside evaluation, they agree with the plan of care.  Patient has received a total of 6 mg of morphine and still continues to moan in pain.  Call placed to Dr. Guzman for admission.  0455: Discussed patient with Dr. Guzman who agrees [facility].  He would like for us to obtain a urine sample.    ADMISSION    Discussed treatment plan and reason for admission with pt/family and admitting physician.  Pt/family voiced understanding of the plan for admission for further testing/treatment as needed.      DIAGNOSIS  Final diagnoses:   Right groin pain   Leukocytosis, unspecified type   Elevated C-reactive protein (CRP)         COURSE & MEDICAL DECISION MAKING  Pertinent Labs and Imaging studies that were ordered and reviewed are noted above.  Results were reviewed/discussed with the patient and they were also made aware of online assess.   Pt also made aware that  "some labs, such as cultures, will not be resulted during ER visit and follow up with PMD is necessary.     MEDICATIONS GIVEN IN ER  Medications   morphine injection 4 mg (4 mg Intravenous Given 4/5/20 0327)   ondansetron (ZOFRAN) injection 4 mg (4 mg Intravenous Given 4/5/20 0327)   morphine injection 2 mg (2 mg Intravenous Given 4/5/20 0438)       /62   Pulse 72   Temp 99.5 °F (37.5 °C) (Tympanic)   Resp 20   Ht 170.2 cm (67\")   Wt 83.9 kg (185 lb)   SpO2 97%   BMI 28.98 kg/m²         COLLINS Em on 4/5/2020 at 04:56.      Argentina Huizar, APRN  04/05/20 0457    "

## 2020-04-05 NOTE — CONSULTS
Orthopaedic Surgery  Hip Fracture Consult Note  Dr.Scott Arcos  [phone]    HPI:  Patient is a 89 y.o.  female who presents with right hip pain.  She does not describe a particular injury although a couple of months ago she did have a fall in which she fractured her ankle on the right side and this was treated conservatively.  They presented to the ER for further workup where a right Hip pain was noted by initial radiographic studies were negative for a fracture. I was consulted for further management.  She simply was moaning in pain in the emergency department but today she is able answer questions although she is very hard of hearing.  She does not want to lift or rotate the right leg although she does freely move the left.    MEDICAL HISTORY  Past Medical History:   Diagnosis Date   • Hypertension    • Macular degeneration    ·   Past Surgical History:   Procedure Laterality Date   • ANKLE OPEN REDUCTION INTERNAL FIXATION Right 1/17/2020    Procedure: BIMALLEOLAR FRACTURE OPEN REDUCTION INTERNAL FIXATION;  Surgeon: Js Montalvo Jr., MD;  Location: [facility];  Service: Orthopedics   • APPENDECTOMY     • DIAGNOSTIC LAPAROSCOPY      for ovarian cyst, when patient was 15 years old   ·   Prior to Admission medications    Medication Sig Start Date End Date Taking? Authorizing Provider   gabapentin (NEURONTIN) 800 MG tablet Take 800 mg by mouth 3 (Three) Times a Day.   Yes Juan Shaikh MD   levothyroxine (SYNTHROID, LEVOTHROID) 112 MCG tablet Take 112 mcg by mouth Every Morning Before Breakfast.   Yes Juan Shaikh MD   metoprolol succinate XL (TOPROL-XL) 25 MG 24 hr tablet Take 25 mg by mouth Daily.   Yes Juan Shaikh MD   ·   Allergies   Allergen Reactions   • Penicillins Angioedema   ·   ·   · There is no immunization history on file for this patient.  Social History     Tobacco Use   • Smoking status: Never Smoker   • Smokeless tobacco: Never Used   Substance Use  "Topics   • Alcohol use: Not Currently   ·    Social History     Substance and Sexual Activity   Drug Use Never   ·     VITALS: /64 (BP Location: Right arm, Patient Position: Lying)   Pulse 75   Temp 98.5 °F (36.9 °C) (Oral)   Resp 20   Ht 160 cm (63\")   Wt 75.4 kg (166 lb 3.2 oz)   SpO2 97%   Breastfeeding No   BMI 29.44 kg/m²  Body mass index is 29.44 kg/m².    PHYSICAL EXAM:   · CONSTITUTIONAL: No acute distress  · LUNGS: Equal chest rise, no shortness of air  · CARDIOVASCULAR: palpable peripheral pulses  · SKIN: no skin lesions in the area examined  · LYMPH: no lymphadenopathy in the area examined  · Right Lower Extremity  · Tenderness to Palpation: Tenderness to palpation at the hip  · She has tenderness about the right knee and distal femur.  She does not want to actively move the knee where she will bend the left knee.  · Pulses:  Palpable DP/PT pulses  · Sensation: Sensation intact to light touch to saphenous/sural/deep peroneal/superficial peroneal/tibial nerves  · Motor: 5 out of 5 EHL/FHL/TA/GS motor complexes  · Range of Motion: Range of motion of hip deferred secondary to pain.  Positive pain with passive leg roll    RADIOLOGY REVIEW:   Ct Pelvis Without Contrast    Result Date: 4/2/2020  Degenerative change at the hips and the spine. No acute abnormality is identified. I discussed the case with Dr. Vu of the emergency department at 3 PM.  This report was finalized on 4/2/2020 3:45 PM by Dr. Js Stark M.D.      Xr Chest 1 View    Result Date: 4/2/2020  Tiny right pleural effusion. Otherwise negative chest x-ray.  This report was finalized on 4/2/2020 12:46 PM by Dr. Js Stark M.D.      Xr Hip With Or Without Pelvis 2 - 3 View Right    Result Date: 4/5/2020  1. No acute osseous abnormality.  This report was finalized on 4/5/2020 5:40 AM by Kevin Yarbrough M.D.        LABS:   Recent Results (from the past 24 hour(s))   Comprehensive Metabolic Panel    Collection Time: 04/05/20  " 3:26 AM   Result Value Ref Range    Glucose 119 (H) 65 - 99 mg/dL    BUN 10 8 - 23 mg/dL    Creatinine 0.82 0.57 - 1.00 mg/dL    Sodium 138 136 - 145 mmol/L    Potassium 3.9 3.5 - 5.2 mmol/L    Chloride 99 98 - 107 mmol/L    CO2 25.4 22.0 - 29.0 mmol/L    Calcium 9.0 8.2 - 9.6 mg/dL    Total Protein 5.9 (L) 6.0 - 8.5 g/dL    Albumin 3.30 (L) 3.50 - 5.20 g/dL    ALT (SGPT) 5 1 - 33 U/L    AST (SGOT) 15 1 - 32 U/L    Alkaline Phosphatase 75 39 - 117 U/L    Total Bilirubin 0.5 0.2 - 1.2 mg/dL    eGFR Non African Amer 65 >60 mL/min/1.73    Globulin 2.6 gm/dL    A/G Ratio 1.3 g/dL    BUN/Creatinine Ratio 12.2 7.0 - 25.0    Anion Gap 13.6 5.0 - 15.0 mmol/L   CBC Auto Differential    Collection Time: 04/05/20  3:26 AM   Result Value Ref Range    WBC 13.95 (H) 3.40 - 10.80 10*3/mm3    RBC 3.63 (L) 3.77 - 5.28 10*6/mm3    Hemoglobin 10.5 (L) 12.0 - 15.9 g/dL    Hematocrit 32.8 (L) 34.0 - 46.6 %    MCV 90.4 79.0 - 97.0 fL    MCH 28.9 26.6 - 33.0 pg    MCHC 32.0 31.5 - 35.7 g/dL    RDW 13.6 12.3 - 15.4 %    RDW-SD 45.0 37.0 - 54.0 fl    MPV 9.1 6.0 - 12.0 fL    Platelets 544 (H) 140 - 450 10*3/mm3    Neutrophil % 76.6 (H) 42.7 - 76.0 %    Lymphocyte % 13.4 (L) 19.6 - 45.3 %    Monocyte % 5.8 5.0 - 12.0 %    Eosinophil % 3.2 0.3 - 6.2 %    Basophil % 0.5 0.0 - 1.5 %    Immature Grans % 0.5 0.0 - 0.5 %    Neutrophils, Absolute 10.68 (H) 1.70 - 7.00 10*3/mm3    Lymphocytes, Absolute 1.87 0.70 - 3.10 10*3/mm3    Monocytes, Absolute 0.81 0.10 - 0.90 10*3/mm3    Eosinophils, Absolute 0.45 (H) 0.00 - 0.40 10*3/mm3    Basophils, Absolute 0.07 0.00 - 0.20 10*3/mm3    Immature Grans, Absolute 0.07 (H) 0.00 - 0.05 10*3/mm3    nRBC 0.0 0.0 - 0.2 /100 WBC   C-reactive Protein    Collection Time: 04/05/20  3:26 AM   Result Value Ref Range    C-Reactive Protein 0.92 (H) 0.00 - 0.50 mg/dL   Sedimentation Rate    Collection Time: 04/05/20  3:26 AM   Result Value Ref Range    Sed Rate 10 0 - 30 mm/hr   Urinalysis With Microscopic If Indicated  (No Culture) - Urine, Catheter    Collection Time: 04/05/20  5:07 AM   Result Value Ref Range    Color, UA Yellow Yellow, Straw    Appearance, UA Clear Clear    pH, UA 6.5 5.0 - 8.0    Specific Gravity, UA >=1.030 1.005 - 1.030    Glucose, UA Negative Negative    Ketones, UA Trace (A) Negative    Bilirubin, UA Negative Negative    Blood, UA Negative Negative    Protein, UA Negative Negative    Leuk Esterase, UA Negative Negative    Nitrite, UA Negative Negative    Urobilinogen, UA 1.0 E.U./dL 0.2 - 1.0 E.U./dL       IMPRESSION:  Patient is a 89 y.o. Not  or  female with right Hip pain.  Right knee pain  History of right ankle fracture  Her white count is slightly elevated at 13 and her CRP is 0.92 but her sed rate is 10.  This would indicate inflammation at the least and infection at the worst.  An MRI will determine if there is fluid in her hip.    PLAN:   · Admited to: Jimbo Guzman MD  · Diet: NPO after midnight, Regular for Now  · Weight Bearing:Right Lower Extremity Non Weight Bearing.  · Labs: None additional needed  · Imaging: MRI of Right hip  · Surgery: No surgery indicated for this injury at this time unless MRI is positive  · We will also x-ray right knee to rule out supracondylar fracture or knee injury.    Samuel Arcos MD  Orthopaedic Surgery  [facility]  [phone] - [place] Office  [phone] - [place] Office

## 2020-04-05 NOTE — ED NOTES
"Nursing report ED to floor  Manuela Gonzalez  89 y.o.  female    HPI (triage note):   Chief Complaint   Patient presents with   • Leg Pain       Admitting doctor:   Jimbo Gzuman MD    Admitting diagnosis:   The primary encounter diagnosis was Right groin pain. Diagnoses of Leukocytosis, unspecified type and Elevated C-reactive protein (CRP) were also pertinent to this visit.    Code status:   Current Code Status     Date Active Code Status Order ID Comments User Context       Prior          Allergies:   Penicillins    Weight:       04/05/20 0313   Weight: 83.9 kg (185 lb)       Most recent vitals:   Vitals:    04/05/20 0307 04/05/20 0313 04/05/20 0400   BP: 150/77  160/62   Pulse: 82  72   Resp: 20     Temp:  99.5 °F (37.5 °C)    TempSrc:  Tympanic    SpO2: 94%  97%   Weight:  83.9 kg (185 lb)    Height:  170.2 cm (67\")        Active LDAs/IV Access:   Lines, Drains & Airways    Active LDAs     Name:   Placement date:   Placement time:   Site:   Days:    Peripheral IV 04/05/20 0327 Left Antecubital   04/05/20 0327    Antecubital   less than 1    External Urinary Catheter   04/05/20    0506    --   less than 1                Labs (abnormal labs have a star):   Labs Reviewed   COMPREHENSIVE METABOLIC PANEL - Abnormal; Notable for the following components:       Result Value    Glucose 119 (*)     Total Protein 5.9 (*)     Albumin 3.30 (*)     All other components within normal limits    Narrative:     GFR Normal >60  Chronic Kidney Disease <60  Kidney Failure <15     CBC WITH AUTO DIFFERENTIAL - Abnormal; Notable for the following components:    WBC 13.95 (*)     RBC 3.63 (*)     Hemoglobin 10.5 (*)     Hematocrit 32.8 (*)     Platelets 544 (*)     Neutrophil % 76.6 (*)     Lymphocyte % 13.4 (*)     Neutrophils, Absolute 10.68 (*)     Eosinophils, Absolute 0.45 (*)     Immature Grans, Absolute 0.07 (*)     All other components within normal limits   C-REACTIVE PROTEIN - Abnormal; Notable for the following components: "    C-Reactive Protein 0.92 (*)     All other components within normal limits   SEDIMENTATION RATE - Normal   URINALYSIS W/ MICROSCOPIC IF INDICATED (NO CULTURE)   CBC AND DIFFERENTIAL    Narrative:     The following orders were created for panel order CBC & Differential.  Procedure                               Abnormality         Status                     ---------                               -----------         ------                     CBC Auto Differential[[id number]]        Abnormal            Final result                 Please view results for these tests on the individual orders.       EKG:   No orders to display       Meds given in ED:   Medications   morphine injection 4 mg (has no administration in time range)   morphine injection 4 mg (4 mg Intravenous Given 4/5/20 0327)   ondansetron (ZOFRAN) injection 4 mg (4 mg Intravenous Given 4/5/20 0327)   morphine injection 2 mg (2 mg Intravenous Given 4/5/20 0438)       Imaging results:  Xr Hip With Or Without Pelvis 2 - 3 View Right    Result Date: 4/5/2020  1. No acute osseous abnormality.         Ambulatory status:   - bedrest    Social issues:   Social History     Socioeconomic History   • Marital status:      Spouse name: Not on file   • Number of children: Not on file   • Years of education: Not on file   • Highest education level: Not on file   Tobacco Use   • Smoking status: Never Smoker   • Smokeless tobacco: Never Used   Substance and Sexual Activity   • Alcohol use: Not Currently   • Drug use: Never   • Sexual activity: Ramin Correa RN  04/05/20 6100

## 2020-04-06 NOTE — PLAN OF CARE
Problem: Patient Care Overview  Goal: Plan of Care Review  Outcome: Ongoing (interventions implemented as appropriate)  Flowsheets (Taken 4/6/2020 0328)  Progress: no change  Plan of Care Reviewed With: patient  Outcome Summary: c/o right groin/leg/knee pain with movement, had bone scan - pt is cleared for any activity that she can tolerate, no n/v, tolerating diet, right leg pulses palpable and scant amt of edema noted, scds on all day, one unit PRBC iniaited this shift

## 2020-04-06 NOTE — PLAN OF CARE
Problem: Patient Care Overview  Goal: Plan of Care Review  Outcome: Ongoing (interventions implemented as appropriate)  Flowsheets (Taken 4/6/2020 8102)  Progress: no change  Plan of Care Reviewed With: patient  Outcome Summary: C/o right groin pain, given morphine prn. Saline locked. Purewik in place. Turn q 2 hrs. Swallowing meds whole with water. 2 L of O2 worn during the night. Dr. Medina at bedside last night. Pt confused at times, disoriented to place, time, and situation - reoriented frequently. Bed alarm for safety. VSS. Rested comfortably.

## 2020-04-06 NOTE — CONSULTS
CONSULT NOTE    Infectious Diseases - Salome Mott MD  [facility]       Patient Identification:  Name: Manuela Gonzalez  Age: 89 y.o.  Sex: female  :  1923  MRN: [id number]             Date of Consultation: 2020      Primary Care Physician: Val Crawford MD                               Requesting Physician: Dr. Guzman  Reason for Consultation: Right groin pain possible infection  Source of information patient herself which is limited due to her reaction to severe pain.  Impression: 89-year-old female who underwent open reduction and internal fixation of right ankle bimalleolar fracture on 2020.  Patient has done well since until 2020 when she presented to the emergency room with sudden onset of chest pain and right groin pain.  Patient was in the rehabilitation facility after her surgery on her ankle until a week prior to 2020.  She has been home for about a week.  There is no documented trauma or injury.  Extensive work-up on 2020 did not reveal any specific reason for her to have right groin pain and patient was discharged.  CT scan of the abdomen and pelvis without contrast did not reveal any acute abnormality except for degenerative changes in the hip and the spine.  Patient continued to have pain and discomfort in the and was brought to the hospital again earlier today and was evaluated with plain x-ray and MRI and orthopedic evaluation.  Her inflammatory work-up showed CRP of 0.19 and sed rate of 10.  MRI is ordered which revealed heterogeneous hematoma located in the anterior medial area of the femoral neck with surrounding soft tissue edema involving the adjacent muscle group.  This presentation is consistent with:  1-right hip pain/groin pain due to muscle injury and hematoma likely bland process and highly unlikely it to be infectious in nature given the lack of systemic signs and symptoms of sepsis and essentially bland inflammatory  markers.  2-immobility due to recent right hip and groin pain  3-status post right ankle fracture and surgery  4-hypertension  5-macular degeneration  6-mild leukocytosis likely due to demargination instead of active infection.  7-mild eosinophilia significance unclear.  8-progressive anemia due to internal bleed.      Recommendations/Discussions: At this juncture I would not recommend any further infectious disease work-up and continue with management of pain and discomfort in the right groin area as per internal medicine and orthopedic surgery service.  Keep an eye on her hemoglobin hematocrit.  Follow-up on blood culture results and further management as her condition evolves.  Thank you Dr. Caicedo for letting me be the part of your patient care please see above impression and recommendations.        History of Present Illness:   89-year-old female who underwent open reduction and internal fixation of right ankle bimalleolar fracture on 17 January 2020.  Patient has done well since until 4/2/2020 when she presented to the emergency room with sudden onset of chest pain and right groin pain.  Patient was in the rehabilitation facility after her surgery on her ankle until a week prior to 4/2/2020.  She has been home for about a week.  There is no documented trauma or injury.  Extensive work-up on 4/2/2020 did not reveal any specific reason for her to have right groin pain and patient was discharged.  CT scan of the abdomen and pelvis without contrast did not reveal any acute abnormality except for degenerative changes in the hip and the spine.  Patient continued to have pain and discomfort in the and was brought to the hospital again earlier today and was evaluated with plain x-ray and MRI and orthopedic evaluation.  Her inflammatory work-up showed CRP of 0.19 and sed rate of 10.  MRI is ordered which revealed heterogeneous hematoma located in the anterior medial area of the femoral neck with surrounding soft tissue  edema involving the adjacent muscle group.      Past Medical History:  Past Medical History:   Diagnosis Date   • Hypertension    • Macular degeneration      Past Surgical History:  Past Surgical History:   Procedure Laterality Date   • ANKLE OPEN REDUCTION INTERNAL FIXATION Right 1/17/2020    Procedure: BIMALLEOLAR FRACTURE OPEN REDUCTION INTERNAL FIXATION;  Surgeon: Js Montalvo Jr., MD;  Location: [facility];  Service: Orthopedics   • APPENDECTOMY     • DIAGNOSTIC LAPAROSCOPY      for ovarian cyst, when patient was 15 years old      Home Meds:  Medications Prior to Admission   Medication Sig Dispense Refill Last Dose   • gabapentin (NEURONTIN) 800 MG tablet Take 800 mg by mouth 3 (Three) Times a Day.      • levothyroxine (SYNTHROID, LEVOTHROID) 112 MCG tablet Take 112 mcg by mouth Every Morning Before Breakfast.   1/13/2020 at Unknown time   • metoprolol succinate XL (TOPROL-XL) 25 MG 24 hr tablet Take 25 mg by mouth Daily.   1/13/2020 at Unknown time     Current Meds:     Current Facility-Administered Medications:   •  gabapentin (NEURONTIN) capsule 800 mg, 800 mg, Oral, Q8H, Jimbo Guzman MD, 800 mg at 04/05/20 1330  •  levothyroxine (SYNTHROID, LEVOTHROID) tablet 112 mcg, 112 mcg, Oral, QAM AC, Jimbo Guzman MD, 112 mcg at 04/05/20 1330  •  metoprolol succinate XL (TOPROL-XL) 24 hr tablet 25 mg, 25 mg, Oral, Daily, Jimbo Guzman MD, 25 mg at 04/05/20 1330  •  morphine injection 1 mg, 1 mg, Intravenous, Q2H PRN **OR** morphine injection 2 mg, 2 mg, Intravenous, Q2H PRN, Jimbo Guzman MD, 2 mg at 04/05/20 1126  •  ondansetron (ZOFRAN) injection 4 mg, 4 mg, Intravenous, Q6H PRN, Jimbo Guzman MD  •  [START ON 4/6/2020] pantoprazole (PROTONIX) EC tablet 40 mg, 40 mg, Oral, Q AM, Jimbo Guzman MD  Allergies:  Allergies   Allergen Reactions   • Penicillins Angioedema     Social History:   Social History     Tobacco Use   • Smoking status: Never Smoker   • Smokeless tobacco: Never Used   Substance Use Topics   •  "Alcohol use: Not Currently      Family History:  History reviewed. No pertinent family history.       Review of Systems  See history of present illness and past medical history.  Very limited because of patient's reaction to pain.      Vitals:   BP 96/46 (BP Location: Right arm, Patient Position: Lying)   Pulse 64   Temp 97 °F (36.1 °C) (Oral)   Resp 20   Ht 160 cm (63\")   Wt 75.4 kg (166 lb 3.2 oz)   SpO2 94%   Breastfeeding No   BMI 29.44 kg/m²   I/O:     Intake/Output Summary (Last 24 hours) at 4/5/2020 2051  Last data filed at 4/5/2020 1836  Gross per 24 hour   Intake 100 ml   Output 200 ml   Net -100 ml     Exam:  Patient is examined using the personal protective equipment as per guidelines from infection control for this particular patient as enacted.  Hand washing was performed before and after patient interaction.  General Appearance:   Elderly uncomfortable female who is in distress because of pain especially when she is moved.   Head:    Normocephalic, without obvious abnormality, atraumatic   Eyes:    PERRL, conjunctivae/corneas clear, EOM's intact, both eyes   Ears:    Normal external ear canals, both ears   Nose:   Nares normal, septum midline, mucosa normal, no drainage    or sinus tenderness   Throat:   Lips, tongue, gums normal; oral mucosa pink and moist   Neck:   Supple, symmetrical, trachea midline, no adenopathy;     thyroid:  no enlargement/tenderness/nodules; no carotid    bruit or JVD   Back:     Symmetric, no curvature, ROM normal, no CVA tenderness   Lungs:     Clear to auscultation bilaterally, respirations unlabored   Chest Wall:    No tenderness or deformity    Heart:    Regular rate and rhythm, S1 and S2 normal, no murmur, rub   or gallop   Abdomen:     Soft, non-tender, bowel sounds active all four quadrants,     no masses, no hepatomegaly, no splenomegaly   Extremities:  Significant right medial proximal thigh groin and anterior thigh area tenderness with no warmth.  Right " thigh appears to be larger than the left.  Range of motion of the right hip joint is limited.   Pulses:   Pulses palpable in all extremities; symmetric all extremities   Skin:   Skin color normal, Skin is warm and dry,  no rashes or palpable lesions   Neurologic:  Grossly nonfocal except for guarded movement of the right lower extremity due to pain.       Data Review:    I reviewed the patient's new clinical results.  Results from last 7 days   Lab Units 04/05/20  0326 04/02/20  1153   WBC 10*3/mm3 13.95* 7.66   HEMOGLOBIN g/dL 10.5* 11.2*   PLATELETS 10*3/mm3 544* 523*     Results from last 7 days   Lab Units 04/05/20  0326 04/02/20  1153   SODIUM mmol/L 138 142   POTASSIUM mmol/L 3.9 4.6   CHLORIDE mmol/L 99 103   CO2 mmol/L 25.4 28.8   BUN mg/dL 10 12   CREATININE mg/dL 0.82 0.84   CALCIUM mg/dL 9.0 8.6   GLUCOSE mg/dL 119* 91     Microbiology Results (last 10 days)     ** No results found for the last 240 hours. **      Ct Pelvis Without Contrast    Result Date: 4/2/2020  Degenerative change at the hips and the spine. No acute abnormality is identified. I discussed the case with Dr. Vu of the emergency department at 3 PM.  This report was finalized on 4/2/2020 3:45 PM by Dr. Js Stark M.D.      Xr Chest 1 View    Result Date: 4/2/2020  Tiny right pleural effusion. Otherwise negative chest x-ray.  This report was finalized on 4/2/2020 12:46 PM by Dr. Js Stark M.D.      Xr Hip With Or Without Pelvis 2 - 3 View Right    Result Date: 4/5/2020  1. No acute osseous abnormality.  This report was finalized on 4/5/2020 5:40 AM by Kevin Yarbrough M.D.            Assessment:  Active Hospital Problems    Diagnosis  POA   • Right groin pain [R10.31]  Yes      Resolved Hospital Problems   No resolved problems to display.         Plan:  See above  Salome Medina MD   4/5/2020  20:51    Much of this encounter note is an electronic transcription/translation of spoken language to printed text. The electronic  translation of spoken language may permit erroneous, or at times, nonsensical words or phrases to be inadvertently transcribed; Although I have reviewed the note for such errors, some may still exist

## 2020-04-06 NOTE — DISCHARGE PLACEMENT REQUEST
"Manuela Zepeda (89 y.o. Female)     Date of Birth Social Security Number Address Home Phone MRN    12/20/1923  [address] [id number] [phone] [id number]    [employer] Mar[employer] Status          Patient Refused        Admission Date Admission Type Admitting Provider Attending Provider Department, Room/Bed    4/5/20 Emergency Jimbo Guzman MD Ahmed, Aftab, MD [address], P676/1    Discharge Date Discharge Disposition Discharge Destination                       Attending Provider:  Jimbo Guzman MD    Allergies:  Penicillins    Isolation:  None   Infection:  None   Code Status:  No CPR    Ht:  160 cm (63\")   Wt:  75.4 kg (166 lb 3.2 oz)    Admission Cmt:  None   Principal Problem:  None                Active Insurance as of 4/5/2020     Primary Coverage     Payor Plan Insurance Group Employer/Plan Group    ANTHEM MEDICARE REPLACEMENT ANTHEM MEDICARE ADVANTAGE [id number]     Payor Plan Address Payor Plan Phone Number Payor Plan Fax Number Effective Dates    [address] [phone]  1/1/2018 - None Entered    [facility] [id number]       Subscriber Name Subscriber Birth Date Member ID       MANUELA ZEPEDA 12/20/1923 [id number]                 Emergency Contacts      (Rel.) Home Phone Work Phone Mobile Phone    GEMMA MARIN (Daughter) [phone] -- --              "

## 2020-04-06 NOTE — PROGRESS NOTES
"  Orthopaedic Surgery   Daily Progress Note  Dr. Mike Vallecillo Sr  [phone]  DEMOGRAPHICS:   · Manuela Gonzalez   · Age:89 y.o.   · MRN:[id number]  · Admitted: 4/5/2020    PROCEDURE:   s/p * No surgery found *     BP (!) 87/48 (BP Location: Right arm, Patient Position: Sitting)   Pulse 67   Temp 98.1 °F (36.7 °C) (Oral)   Resp 18   Ht 160 cm (63\")   Wt 75.4 kg (166 lb 3.2 oz)   SpO2 96%   Breastfeeding No   BMI 29.44 kg/m²     Lab Results (last 24 hours)     Procedure Component Value Units Date/Time    Hemoglobin A1c [[id number]]  (Abnormal) Collected:  04/06/20 0739    Specimen:  Blood Updated:  04/06/20 0923     Hemoglobin A1C 4.70 %     Narrative:       Hemoglobin A1C Ranges:    Increased Risk for Diabetes  5.7% to 6.4%  Diabetes                     >= 6.5%  Diabetic Goal                < 7.0%    BNP [[id number]]  (Abnormal) Collected:  04/06/20 0739    Specimen:  Blood from Arm, Right Updated:  04/06/20 0855     proBNP 4,670.0 pg/mL     Narrative:       Among patients with dyspnea, NT-proBNP is highly sensitive for the detection of acute congestive heart failure. In addition NT-proBNP of <300 pg/ml effectively rules out acute congestive heart failure with 99% negative predictive value.    Results may be falsely decreased if patient taking Biotin.      TSH [[id number]]  (Abnormal) Collected:  04/06/20 0739    Specimen:  Blood from Arm, Right Updated:  04/06/20 0855     TSH 6.270 uIU/mL     Comprehensive Metabolic Panel [[id number]]  (Abnormal) Collected:  04/06/20 0739    Specimen:  Blood from Arm, Right Updated:  04/06/20 0848     Glucose 82 mg/dL      BUN 13 mg/dL      Creatinine 0.86 mg/dL      Sodium 140 mmol/L      Potassium 4.2 mmol/L      Chloride 102 mmol/L      CO2 27.5 mmol/L      Calcium 8.2 mg/dL      Total Protein 5.0 g/dL      Albumin 2.70 g/dL      ALT (SGPT) 5 U/L      AST (SGOT) 13 U/L      Alkaline Phosphatase 61 U/L      Total Bilirubin 0.5 mg/dL      eGFR Non African Amer 61 mL/min/1.73 "      Globulin 2.3 gm/dL      A/G Ratio 1.2 g/dL      BUN/Creatinine Ratio 15.1     Anion Gap 10.5 mmol/L     Narrative:       GFR Normal >60  Chronic Kidney Disease <60  Kidney Failure <15      Lipid Panel [[id number]]  (Abnormal) Collected:  04/06/20 0739    Specimen:  Blood from Arm, Right Updated:  04/06/20 0848     Total Cholesterol 110 mg/dL      Triglycerides 52 mg/dL      HDL Cholesterol 61 mg/dL      LDL Cholesterol  39 mg/dL      VLDL Cholesterol 10.4 mg/dL      LDL/HDL Ratio 0.63    Narrative:       Cholesterol Reference Ranges  (U.S. Department of Health and Human Services ATP III Classifications)    Desirable          <200 mg/dL  Borderline High    200-239 mg/dL  High Risk          >240 mg/dL      Triglyceride Reference Ranges  (U.S. Department of Health and Human Services ATP III Classifications)    Normal           <150 mg/dL  Borderline High  150-199 mg/dL  High             200-499 mg/dL  Very High        >500 mg/dL    HDL Reference Ranges  (U.S. Department of Health and Human Services ATP III Classifcations)    Low     <40 mg/dl (major risk factor for CHD)  High    >60 mg/dl ('negative' risk factor for CHD)        LDL Reference Ranges  (U.S. Department of Health and Human Services ATP III Classifcations)    Optimal          <100 mg/dL  Near Optimal     100-129 mg/dL  Borderline High  130-159 mg/dL  High             160-189 mg/dL  Very High        >189 mg/dL    CK [[id number]]  (Normal) Collected:  04/06/20 0739    Specimen:  Blood from Arm, Right Updated:  04/06/20 0848     Creatine Kinase 153 U/L     CBC & Differential [[id number]] Collected:  04/06/20 0739    Specimen:  Blood Updated:  04/06/20 0756    Narrative:       The following orders were created for panel order CBC & Differential.  Procedure                               Abnormality         Status                     ---------                               -----------         ------                     CBC Auto Differential[[id number]]         Abnormal            Final result                 Please view results for these tests on the individual orders.    CBC Auto Differential [[id number]]  (Abnormal) Collected:  04/06/20 0739    Specimen:  Blood Updated:  04/06/20 0756     WBC 9.57 10*3/mm3      RBC 2.60 10*6/mm3      Hemoglobin 7.7 g/dL      Hematocrit 23.8 %      MCV 91.5 fL      MCH 29.6 pg      MCHC 32.4 g/dL      RDW 13.3 %      RDW-SD 44.9 fl      MPV 9.2 fL      Platelets 347 10*3/mm3      Neutrophil % 69.7 %      Lymphocyte % 15.3 %      Monocyte % 10.2 %      Eosinophil % 3.8 %      Basophil % 0.6 %      Immature Grans % 0.4 %      Neutrophils, Absolute 6.67 10*3/mm3      Lymphocytes, Absolute 1.46 10*3/mm3      Monocytes, Absolute 0.98 10*3/mm3      Eosinophils, Absolute 0.36 10*3/mm3      Basophils, Absolute 0.06 10*3/mm3      Immature Grans, Absolute 0.04 10*3/mm3      nRBC 0.0 /100 WBC     Blood Culture - Blood, Arm, Left [[id number]] Collected:  04/05/20 1208    Specimen:  Blood from Arm, Left Updated:  04/05/20 1218    Blood Culture - Blood, Arm, Right [[id number]] Collected:  04/05/20 1208    Specimen:  Blood from Arm, Right Updated:  04/05/20 1218          Imaging Results (Last 24 Hours)     Procedure Component Value Units Date/Time    NM Bone Scan Limited [[id number]] Resulted:  04/06/20 0956     Updated:  04/06/20 0956    MRI Hip Right Without Contrast [[id number]] Collected:  04/05/20 1015     Updated:  04/05/20 1100    Narrative:       MR SCAN OF THE PELVIS AND RIGHT HIP     HISTORY: Trauma. Severe right hip pain.     FINDINGS:  The MR scan was performed with sagittal and axial images and  compared to recent plain films as well as a CT scan of the pelvis  performed 3 days ago. The following findings are present:  1. Although no acute right hip fracture is identified, there is now  considerable edematous change in the soft tissues surrounding the right  hip that particularly extends anteriorly and is associated with a  heterogeneous  hematoma located anteromedial to the femoral neck. This is  new since the recent CT scan and the hematoma measures approximately 4.5  cm in AP diameter, 5.4 cm in transverse diameter, and 9.4 cm in  craniocaudal extent. There is also a larger area of surrounding soft  tissue edema in the fat planes surrounding the muscle groups.  2. There is a tiny right-sided joint effusion. There is also a small  joint effusion involving the left hip.  3. The deeper soft tissue structures of the pelvis are unremarkable  except for a tiny amount of free fluid in the presacral space.     This report was finalized on 4/5/2020 10:57 AM by Dr. Alex Braun M.D.             Patient Care Team:  Val Crawford MD as PCP - General (Internal Medicine)    SUBJECTIVE  Groin pain remains    PHYSICAL EXAM  Exam unchanged     ASSESSMENT:   1.  I have asked radiology to review the MRI.  She has a rupture of her iliopsoas tendon that seems to be a change from her CT scan a few days ago.  This has caused a sizable hematoma.  The hematoma itself could be causing her pain.  She has had a drop in hemoglobin of 3.5 g (currently at 7.7).  I asked radiology to specifically look for a vascular etiology.  There is no apparent iliac artery pathology that could be causing this.    2.  Acute blood loss anemia    PLAN / DISPOSITION:  Because of her acute hemoglobin drop, and because I think she could equilibrate lower, I am going to transfuse her.  We will probably repeat her CT scan tomorrow.    Mike Vallecillo Sr, MD  Orthopaedic Surgery  [facility]  [phone]

## 2020-04-06 NOTE — PROGRESS NOTES
"  Infectious Diseases Progress Note    Salome Medina MD     [facility]  [place]: 0 days  Patient Identification:  Name: Manuela Gonzalez  Age: 89 y.o.  Sex: female  :  1923  MRN: [id number]         Primary Care Physician: Val Crawford MD            Subjective: Pain is somewhat better.  Feels sleepy.  Right groin hurts when she moves.  Interval History: see consultation note    Objective:    Scheduled Meds:  gabapentin 800 mg Oral Q8H   levothyroxine 112 mcg Oral QAM AC   metoprolol succinate XL 25 mg Oral Daily   pantoprazole 40 mg Oral Q AM     Continuous Infusions:     Vital signs in last 24 hours:  Temp:  [97 °F (36.1 °C)-97.5 °F (36.4 °C)] 97.5 °F (36.4 °C)  Heart Rate:  [64-88] 75  Resp:  [20] 20  BP: ()/(46-64) 125/56    Intake/Output:    Intake/Output Summary (Last 24 hours) at 2020 0901  Last data filed at 2020 0253  Gross per 24 hour   Intake 0 ml   Output 275 ml   Net -275 ml       Exam:  /56 (BP Location: Right arm, Patient Position: Lying)   Pulse 75   Temp 97.5 °F (36.4 °C) (Axillary)   Resp 20   Ht 160 cm (63\")   Wt 75.4 kg (166 lb 3.2 oz)   SpO2 96%   Breastfeeding No   BMI 29.44 kg/m²   Patient is examined using the personal protective equipment as per guidelines from infection control for this particular patient as enacted.  Hand washing was performed before and after patient interaction.  General Appearance:  Comfortable sleepy but arousable in no acute distress                          Head:    Normocephalic, without obvious abnormality, atraumatic                           Eyes:    PERRL, conjunctivae/corneas pale, EOM's intact, both eyes                         Throat:   Lips, tongue, gums normal; oral mucosa pink and moist                           Neck:   Supple, symmetrical, trachea midline, no JVD                         Lungs:    Clear to auscultation bilaterally, respirations unlabored                 Chest Wall:    No tenderness or " deformity                          Heart:    Regular rate and rhythm, S1 and S2 normal                  Abdomen:     Soft, non-tender, bowel sounds active                 Extremities: Right thigh and groin area larger than the left and tender                        Pulses:   Pulses palpable in all extremities                            Skin:   Skin is warm and dry,  no rashes or palpable lesions                  Neurologic: Grossly nonfocal       Data Review:    I reviewed the patient's new clinical results.  Results from last 7 days   Lab Units 04/06/20  0739 04/05/20 0326 04/02/20  1153   WBC 10*3/mm3 9.57 13.95* 7.66   HEMOGLOBIN g/dL 7.7* 10.5* 11.2*   PLATELETS 10*3/mm3 347 544* 523*     Results from last 7 days   Lab Units 04/06/20  0739 04/05/20  0326 04/02/20  1153   SODIUM mmol/L 140 138 142   POTASSIUM mmol/L 4.2 3.9 4.6   CHLORIDE mmol/L 102 99 103   CO2 mmol/L 27.5 25.4 28.8   BUN mg/dL 13 10 12   CREATININE mg/dL 0.86 0.82 0.84   CALCIUM mg/dL 8.2 9.0 8.6   GLUCOSE mg/dL 82 119* 91     Ct Pelvis Without Contrast    Result Date: 4/2/2020  Degenerative change at the hips and the spine. No acute abnormality is identified. I discussed the case with Dr. Vu of the emergency department at 3 PM.  This report was finalized on 4/2/2020 3:45 PM by Dr. Js Stark M.D.      Xr Chest 1 View    Result Date: 4/2/2020  Tiny right pleural effusion. Otherwise negative chest x-ray.  This report was finalized on 4/2/2020 12:46 PM by Dr. Js Stark M.D.      Xr Hip With Or Without Pelvis 2 - 3 View Right    Result Date: 4/5/2020  1. No acute osseous abnormality.  This report was finalized on 4/5/2020 5:40 AM by Kevin Yarbrough M.D.      Microbiology Results (last 10 days)     ** No results found for the last 240 hours. **            Assessment:    Right groin pain  1-right hip pain/groin pain due to muscle injury and hematoma likely bland process and highly unlikely it to be infectious in nature given the lack of  systemic signs and symptoms of sepsis and essentially bland inflammatory markers- possibility of occult fracture is high as per ortho.  2-immobility due to recent right hip and groin pain  3-status post right ankle fracture and surgery  4-hypertension  5-macular degeneration  6-mild leukocytosis likely due to demargination instead of active infection.  7-mild eosinophilia significance unclear.  8-progressive anemia due to internal bleed - hgb dropped to 7.7.        Recommendations/Discussions:   · I would not recommend any further infectious disease work-up and continue with management of pain and discomfort in the right groin area as per internal medicine and orthopedic surgery service.  Keep an eye on her hemoglobin hematocrit.    · Follow up on bone scan  · Follow-up on blood culture results and further management as her condition evolves.      Salome Medina MD  4/6/2020  09:01    Much of this encounter note is an electronic transcription/translation of spoken language to printed text. The electronic translation of spoken language may permit erroneous, or at times, nonsensical words or phrases to be inadvertently transcribed; Although I have reviewed the note for such errors, some may still exist

## 2020-04-06 NOTE — PROGRESS NOTES
"Daily progress note    Chief complaint  Doing little better  Sleepy lethargic  Arousable and follow commands  Answer all questions  Pain well controlled  No new problems except right knee pain    History of present illness  89-year-old white female with history of hypertension macular degeneration and severe osteoarthritis presented to [facility] emergency room with right groin and right hip pain.  Patient has been evaluated in ER which showed no evidence of any fracture but she is in a lot of pain admit for further management.  Patient is poor historian but she is in a lot of pain crying admit for management.  No recent fall trauma injury per nursing staff.  Also no fever chills night sweats weight loss or weight gain.     REVIEW OF SYSTEMS  Unable to perform      PHYSICAL EXAM  Blood pressure (!) 87/48, pulse 61, temperature 98.1 °F (36.7 °C), temperature source Oral, resp. rate 18, height 160 cm (63\"), weight 75.4 kg (166 lb 3.2 oz), SpO2 93 %, not currently breastfeeding.    Constitutional: She is well-developed, well-nourished, and in no distress.   Head: Normocephalic.   Mouth/Throat: Mucous membranes are normal.   Eyes: No scleral icterus.   Neck: Normal range of motion.   Cardiovascular: Normal rate, regular rhythm and normal heart sounds.   Pulses:Dorsalis pedis pulses are 2+ on the right side, and 2+ on the left side. Posterior tibial pulses are 2+ on the right side, and 2+ on the left side.   Pulmonary/Chest: Effort normal and breath sounds normal.   Musculoskeletal: Normal range of motion. No erythema or signs of infection noted to right groin   Neurological: She is alert.   Skin: Skin is warm and dry.   Psychiatric: Mood and affect normal.     LAB RESULTS  Lab Results (last 24 hours)     Procedure Component Value Units Date/Time    Blood Culture - Blood, Arm, Left [[id number]] Collected:  04/05/20 1208    Specimen:  Blood from Arm, Left Updated:  04/06/20 1230     Blood Culture No growth at 24 " hours    Blood Culture - Blood, Arm, Right [[id number]] Collected:  04/05/20 1208    Specimen:  Blood from Arm, Right Updated:  04/06/20 1230     Blood Culture No growth at 24 hours    Hemoglobin A1c [[id number]]  (Abnormal) Collected:  04/06/20 0739    Specimen:  Blood Updated:  04/06/20 0923     Hemoglobin A1C 4.70 %     Narrative:       Hemoglobin A1C Ranges:    Increased Risk for Diabetes  5.7% to 6.4%  Diabetes                     >= 6.5%  Diabetic Goal                < 7.0%    BNP [[id number]]  (Abnormal) Collected:  04/06/20 0739    Specimen:  Blood from Arm, Right Updated:  04/06/20 0855     proBNP 4,670.0 pg/mL     Narrative:       Among patients with dyspnea, NT-proBNP is highly sensitive for the detection of acute congestive heart failure. In addition NT-proBNP of <300 pg/ml effectively rules out acute congestive heart failure with 99% negative predictive value.    Results may be falsely decreased if patient taking Biotin.      TSH [[id number]]  (Abnormal) Collected:  04/06/20 0739    Specimen:  Blood from Arm, Right Updated:  04/06/20 0855     TSH 6.270 uIU/mL     Comprehensive Metabolic Panel [[id number]]  (Abnormal) Collected:  04/06/20 0739    Specimen:  Blood from Arm, Right Updated:  04/06/20 0848     Glucose 82 mg/dL      BUN 13 mg/dL      Creatinine 0.86 mg/dL      Sodium 140 mmol/L      Potassium 4.2 mmol/L      Chloride 102 mmol/L      CO2 27.5 mmol/L      Calcium 8.2 mg/dL      Total Protein 5.0 g/dL      Albumin 2.70 g/dL      ALT (SGPT) 5 U/L      AST (SGOT) 13 U/L      Alkaline Phosphatase 61 U/L      Total Bilirubin 0.5 mg/dL      eGFR Non African Amer 61 mL/min/1.73      Globulin 2.3 gm/dL      A/G Ratio 1.2 g/dL      BUN/Creatinine Ratio 15.1     Anion Gap 10.5 mmol/L     Narrative:       GFR Normal >60  Chronic Kidney Disease <60  Kidney Failure <15      Lipid Panel [[id number]]  (Abnormal) Collected:  04/06/20 0739    Specimen:  Blood from Arm, Right Updated:  04/06/20 0848     Total  Cholesterol 110 mg/dL      Triglycerides 52 mg/dL      HDL Cholesterol 61 mg/dL      LDL Cholesterol  39 mg/dL      VLDL Cholesterol 10.4 mg/dL      LDL/HDL Ratio 0.63    Narrative:       Cholesterol Reference Ranges  (U.S. Department of Health and Human Services ATP III Classifications)    Desirable          <200 mg/dL  Borderline High    200-239 mg/dL  High Risk          >240 mg/dL      Triglyceride Reference Ranges  (U.S. Department of Health and Human Services ATP III Classifications)    Normal           <150 mg/dL  Borderline High  150-199 mg/dL  High             200-499 mg/dL  Very High        >500 mg/dL    HDL Reference Ranges  (U.S. Department of Health and Human Services ATP III Classifcations)    Low     <40 mg/dl (major risk factor for CHD)  High    >60 mg/dl ('negative' risk factor for CHD)        LDL Reference Ranges  (U.S. Department of Health and Human Services ATP III Classifcations)    Optimal          <100 mg/dL  Near Optimal     100-129 mg/dL  Borderline High  130-159 mg/dL  High             160-189 mg/dL  Very High        >189 mg/dL    CK [[id number]]  (Normal) Collected:  04/06/20 0739    Specimen:  Blood from Arm, Right Updated:  04/06/20 0848     Creatine Kinase 153 U/L     CBC & Differential [[id number]] Collected:  04/06/20 0739    Specimen:  Blood Updated:  04/06/20 0756    Narrative:       The following orders were created for panel order CBC & Differential.  Procedure                               Abnormality         Status                     ---------                               -----------         ------                     CBC Auto Differential[[id number]]        Abnormal            Final result                 Please view results for these tests on the individual orders.    CBC Auto Differential [[id number]]  (Abnormal) Collected:  04/06/20 0739    Specimen:  Blood Updated:  04/06/20 0756     WBC 9.57 10*3/mm3      RBC 2.60 10*6/mm3      Hemoglobin 7.7 g/dL      Hematocrit 23.8 %       MCV 91.5 fL      MCH 29.6 pg      MCHC 32.4 g/dL      RDW 13.3 %      RDW-SD 44.9 fl      MPV 9.2 fL      Platelets 347 10*3/mm3      Neutrophil % 69.7 %      Lymphocyte % 15.3 %      Monocyte % 10.2 %      Eosinophil % 3.8 %      Basophil % 0.6 %      Immature Grans % 0.4 %      Neutrophils, Absolute 6.67 10*3/mm3      Lymphocytes, Absolute 1.46 10*3/mm3      Monocytes, Absolute 0.98 10*3/mm3      Eosinophils, Absolute 0.36 10*3/mm3      Basophils, Absolute 0.06 10*3/mm3      Immature Grans, Absolute 0.04 10*3/mm3      nRBC 0.0 /100 WBC         Imaging Results (Last 24 Hours)     Procedure Component Value Units Date/Time    NM Bone Scan Limited [[id number]] Resulted:  04/06/20 0956     Updated:  04/06/20 0956        Current Facility-Administered Medications:   •  gabapentin (NEURONTIN) capsule 800 mg, 800 mg, Oral, Q8H, Liz Guzman MD, 800 mg at 04/06/20 0653  •  levothyroxine (SYNTHROID, LEVOTHROID) tablet 112 mcg, 112 mcg, Oral, QAM AC, Liz Guzman MD, 112 mcg at 04/06/20 0653  •  metoprolol succinate XL (TOPROL-XL) 24 hr tablet 25 mg, 25 mg, Oral, Daily, Liz Guzman MD, 25 mg at 04/06/20 0842  •  morphine injection 1 mg, 1 mg, Intravenous, Q2H PRN **OR** morphine injection 2 mg, 2 mg, Intravenous, Q2H PRN, Liz Guzman MD, 2 mg at 04/05/20 2249  •  ondansetron (ZOFRAN) injection 4 mg, 4 mg, Intravenous, Q6H PRN, Liz Guzman MD  •  pantoprazole (PROTONIX) EC tablet 40 mg, 40 mg, Oral, Q AM, Liz Guzman MD, 40 mg at 04/06/20 0653     ASSESSMENT  Right hip pain with edema and inflammation hematoma and muscle injury  Hypertension  Osteoarthritis  Macular degeneration  Dementia  Chronic anemia   Acute blood loss anemia  Gastroesophageal reflux disease    PLAN  CPM  Transfuse 1 unit packed RBC  Pain management  Orthopedic surgery consult appreciated  Continue home medications  Stress ulcer DVT prophylaxis  Supportive care  PT OT  DNR  Follow closely further recommendation current hospital course    LIZ  GERMÁN ARTHUR

## 2020-04-06 NOTE — PROGRESS NOTES
"  Orthopaedic Surgery   Daily Progress Note  Dr. Mike Vallecillo Sr  [phone]  DEMOGRAPHICS:   · Manuela Gonzalez   · Age:89 y.o.   · MRN:[id number]  · Admitted: 4/5/2020    PROCEDURE:   s/p * No surgery found *     /56 (BP Location: Right arm, Patient Position: Lying)   Pulse 75   Temp 97.5 °F (36.4 °C) (Axillary)   Resp 20   Ht 160 cm (63\")   Wt 75.4 kg (166 lb 3.2 oz)   SpO2 96%   Breastfeeding No   BMI 29.44 kg/m²     Lab Results (last 24 hours)     Procedure Component Value Units Date/Time    Blood Culture - Blood, Arm, Left [[id number]] Collected:  04/05/20 1208    Specimen:  Blood from Arm, Left Updated:  04/05/20 1218    Blood Culture - Blood, Arm, Right [[id number]] Collected:  04/05/20 1208    Specimen:  Blood from Arm, Right Updated:  04/05/20 1218          Imaging Results (Last 24 Hours)     Procedure Component Value Units Date/Time    MRI Hip Right Without Contrast [[id number]] Collected:  04/05/20 1015     Updated:  04/05/20 1100    Narrative:       MR SCAN OF THE PELVIS AND RIGHT HIP     HISTORY: Trauma. Severe right hip pain.     FINDINGS:  The MR scan was performed with sagittal and axial images and  compared to recent plain films as well as a CT scan of the pelvis  performed 3 days ago. The following findings are present:  1. Although no acute right hip fracture is identified, there is now  considerable edematous change in the soft tissues surrounding the right  hip that particularly extends anteriorly and is associated with a  heterogeneous hematoma located anteromedial to the femoral neck. This is  new since the recent CT scan and the hematoma measures approximately 4.5  cm in AP diameter, 5.4 cm in transverse diameter, and 9.4 cm in  craniocaudal extent. There is also a larger area of surrounding soft  tissue edema in the fat planes surrounding the muscle groups.  2. There is a tiny right-sided joint effusion. There is also a small  joint effusion involving the left hip.  3. The " deeper soft tissue structures of the pelvis are unremarkable  except for a tiny amount of free fluid in the presacral space.     This report was finalized on 4/5/2020 10:57 AM by Dr. Alex Braun M.D.       XR Knee 1 or 2 View Right [[id number]] Collected:  04/05/20 0740     Updated:  04/05/20 0746    Narrative:       TWO-VIEW PORTABLE RIGHT KNEE     HISTORY: Fell. Pain.     FINDINGS: There are extremely severe to changes throughout the knee with  marked joint space narrowing and articular irregularity and spurring,  particularly involving the medial compartment. This actually shows  further worsening when compared to the study of 01/14/2020. A very small  joint effusion is present. No fracture is identified. If symptoms  persist, further evaluation with CT scan or MRI scan may be helpful.     This report was finalized on 4/5/2020 7:43 AM by Dr. Alex Braun M.D.             Patient Care Team:  Val Crawford MD as PCP - General (Internal Medicine)    SUBJECTIVE  Still complaining of right groin pain    PHYSICAL EXAM  She has significant right groin pain on even slight rotation of her hip.  The groin pain is interfering with mobilization     ASSESSMENT:   Severe right groin pain consistent with an occult fracture.  X-rays do not show the presence of a fracture nor does an MRI.  She does have what appears to be a hematoma in the groin.  Etiology of this is unknown.  An under recorded fall and soft tissue injury is possible.  Despite negative studies thus far, occult fracture is still possible    PLAN / DISPOSITION:  Bone scan ordered today.  Continue to evaluate.  Mobilize as possible.    Mike Vallecillo Sr, MD  Orthopaedic Surgery  [facility]  [phone]

## 2020-04-07 NOTE — PROGRESS NOTES
Continued Stay Note  [facility]     Patient Name: Manuela Gonzalez  MRN: [id number]  Today's Date: 4/7/2020    Admit Date: 4/5/2020    Discharge Plan     Row Name 04/07/20 1028       Plan    Plan  [facility] vs SNF, referral pending    Patient/Family in Agreement with Plan  yes    Plan Comments  CCP spoke to pt's daughter (Seda Payne, [phone]) via telephone to provide IMM letter (emailed per her request, [email or web address]). Daughter has reviewed CMS compare list and requests rehab referral to [facility] (placed in [facility]), but strongly prefers pt to return home with [facility] (following) if she is able. PT/OT evals pending. CCP to follow. Carla Kim LCSW        Discharge Codes    No documentation.             Donna Kim LCSW

## 2020-04-07 NOTE — DISCHARGE PLACEMENT REQUEST
"Manuela Zepeda (89 y.o. Female)     Date of Birth Social Security Number Address Home Phone MRN    12/20/1923  [address] [id number] [phone] [id number]    Latter day Marital Status          Patient Refused        Admission Date Admission Type Admitting Provider Attending Provider Department, Room/Bed    4/5/20 Emergency Jimbo Guzman MD Ahmed, Aftab, MD [address], P676/1    Discharge Date Discharge Disposition Discharge Destination                       Attending Provider:  Jimbo Guzman MD    Allergies:  Penicillins    Isolation:  None   Infection:  None   Code Status:  No CPR    Ht:  160 cm (63\")   Wt:  75.4 kg (166 lb 3.2 oz)    Admission Cmt:  None   Principal Problem:  None                Active Insurance as of 4/5/2020     Primary Coverage     Payor Plan Insurance Group Employer/Plan Group    ANTHEM MEDICARE REPLACEMENT ANTHEM MEDICARE ADVANTAGE [id number]     Payor Plan Address Payor Plan Phone Number Payor Plan Fax Number Effective Dates    [address] [phone]  1/1/2018 - None Entered    [facility] [id number]       Subscriber Name Subscriber Birth Date Member ID       MANUELA ZEPEDA 12/20/1923 [id number]                 Emergency Contacts      (Rel.) Home Phone Work Phone Mobile Phone    GEMMA MARIN (Daughter) [phone] -- --              "

## 2020-04-07 NOTE — PLAN OF CARE
Problem: Patient Care Overview  Goal: Plan of Care Review  Outcome: Ongoing (interventions implemented as appropriate)   Pale, med for pain, finished 1 unit prbc, labs this am, [place], macular degeneration, forgetful

## 2020-04-07 NOTE — THERAPY EVALUATION
Patient Name: Manuela Gonzalez  : 1923    MRN: [id number]                              Today's Date: 2020       Admit Date: 2020    Visit Dx:     ICD-10-CM ICD-9-CM   1. Right groin pain R10.31 789.03   2. Leukocytosis, unspecified type D72.829 288.60   3. Elevated C-reactive protein (CRP) R79.82 790.95     Patient Active Problem List   Diagnosis   • Traumatic rhabdomyolysis (CMS/HCC)   • Closed bimalleolar fracture of right ankle   • Right groin pain     Past Medical History:   Diagnosis Date   • Hypertension    • Macular degeneration      Past Surgical History:   Procedure Laterality Date   • ANKLE OPEN REDUCTION INTERNAL FIXATION Right 2020    Procedure: BIMALLEOLAR FRACTURE OPEN REDUCTION INTERNAL FIXATION;  Surgeon: Js Montalvo Jr., MD;  Location: [facility];  Service: Orthopedics   • APPENDECTOMY     • DIAGNOSTIC LAPAROSCOPY      for ovarian cyst, when patient was 15 years old     General Information     Row Name 20 1420          PT Evaluation Time/Intention    Document Type  evaluation  -     Mode of Treatment  individual therapy;physical therapy  -     Row Name 20 1420          General Information    Prior Level of Function  min assist:;all household mobility  -     Existing Precautions/Restrictions  fall  -     Barriers to Rehab  hearing deficit  -     Row Name 20 1420          Relationship/Environment    Lives With  child(daria), adult  -     Row Name 20 1420          Resource/Environmental Concerns    Current Living Arrangements  home/apartment/condo  -     Row Name 20 1420          Home Main Entrance    Number of Stairs, Main Entrance  three pt reports her son-in-law brings her up the stairs in w/c  -     Row Name 20 1420          Cognitive Assessment/Intervention- PT/OT    Orientation Status (Cognition)  oriented to;person;place;situation  -     Row Name 20 1420          Safety Issues, Functional Mobility    Safety  Issues Affecting Function (Mobility)  ability to follow commands  -     Impairments Affecting Function (Mobility)  balance;pain;endurance/activity tolerance  -       User Key  (r) = Recorded By, (t) = Taken By, (c) = Cosigned By    Initials Name Provider Type    Kitty Mac, PT Physical Therapist        Mobility     Row Name 04/07/20 1425          Bed Mobility Assessment/Treatment    Bed Mobility Assessment/Treatment  supine-sit;sit-supine;rolling right  -     Rolling Right [place] (Bed Mobility)  minimum assist (75% patient effort)  -     Supine-Sit [place] (Bed Mobility)  moderate assist (50% patient effort)  -     Sit-Supine [place] (Bed Mobility)  moderate assist (50% patient effort)  -     Assistive Device (Bed Mobility)  bed rails;head of bed elevated  -     Row Name 04/07/20 1425          Sit-Stand Transfer    Sit-Stand [place] (Transfers)  minimum assist (75% patient effort)  -     Assistive Device (Sit-Stand Transfers)  walker, front-wheeled  -     Row Name 04/07/20 1425          Gait/Stairs Assessment/Training    [place] Level (Gait)  minimum assist (75% patient effort)  -     Assistive Device (Gait)  walker, front-wheeled  -     Distance in Feet (Gait)  3 stesps forward/backward, limited by pain  -     Deviations/Abnormal Patterns (Gait)  antalgic;jeni decreased;gait speed decreased;stride length decreased  -       User Key  (r) = Recorded By, (t) = Taken By, (c) = Cosigned By    Initials Name Provider Type    Kitty Mac, PT Physical Therapist        Obj/Interventions     Row Name 04/07/20 1426          General ROM    GENERAL ROM COMMENTS  LLE WFL, RLE limited by pain and the hip  -     Row Name 04/07/20 1426          MMT (Manual Muscle Testing)    General MMT Comments  LLE WFL, RLE limited by pain  -     Row Name 04/07/20 1426          Therapeutic Exercise    Comment (Therapeutic Exercise)  BLE AP, LAQ x 10 reps with  assist for RLE  -[facility] Name 04/07/20 1426          Static Sitting Balance    Level of [place] (Unsupported Sitting, Static Balance)  supervision  -     Sitting Position (Unsupported Sitting, Static Balance)  sitting on edge of bed  -     Time Able to Maintain Position (Unsupported Sitting, Static Balance)  2 to 3 minutes  -[facility] Name 04/07/20 1426          Static Standing Balance    Level of [place] (Supported Standing, Static Balance)  minimal assist, 75% patient effort  -     Time Able to Maintain Position (Supported Standing, Static Balance)  30 to 45 seconds  -     Assistive Device Utilized (Supported Standing, Static Balance)  walker, rolling  -[facility] Name 04/07/20 1426          Sensory Assessment/Intervention    Sensory General Assessment  no sensation deficits identified  -       User Key  (r) = Recorded By, (t) = Taken By, (c) = Cosigned By    Initials Name Provider Type    Kitty Mac, PT Physical Therapist        Goals/Plan     [facility] Name 04/07/20 1430          Bed Mobility Goal 1 (PT)    Activity/Assistive Device (Bed Mobility Goal 1, PT)  bed mobility activities, all  -     [place] Level/Cues Needed (Bed Mobility Goal 1, PT)  minimum assist (75% or more patient effort)  -     Time Frame (Bed Mobility Goal 1, PT)  1 week  -[facility] Name 04/07/20 1430          Transfer Goal 1 (PT)    Activity/Assistive Device (Transfer Goal 1, PT)  sit-to-stand/stand-to-sit;bed-to-chair/chair-to-bed  -     [place] Level/Cues Needed (Transfer Goal 1, PT)  minimum assist (75% or more patient effort)  -     Time Frame (Transfer Goal 1, PT)  1 week  -[facility] Name 04/07/20 1430          Gait Training Goal 1 (PT)    Activity/Assistive Device (Gait Training Goal 1, PT)  gait (walking locomotion)  -     [place] Level (Gait Training Goal 1, PT)  contact guard assist  -     Distance (Gait Goal 1, PT)  50 ft  -     Time Frame (Gait Training Goal 1, PT)   1 week  -     Row Name 04/07/20 1430          Patient Education Goal (PT)    Activity (Patient Education Goal, PT)  HEP  -     [place]/Cues/Accuracy (Memory Goal 2, PT)  demonstrates adequately  -     Time Frame (Patient Education Goal, PT)  1 week  -       User Key  (r) = Recorded By, (t) = Taken By, (c) = Cosigned By    Initials Name Provider Type    Kitty Mac, PT Physical Therapist        Clinical Impression     Row Name 04/07/20 1427          Pain Assessment    Additional Documentation  Pain Scale: Numbers Pre/Post-Treatment (Group)  -[facility] Name 04/07/20 1427          Pain Scale: Numbers Pre/Post-Treatment    Pain Scale: Numbers, Pretreatment  7/10  -KH     Pain Scale: Numbers, Post-Treatment  8/10  -KH     Pain Location - Side  Right  -     Pain Location  groin  -     Pain Intervention(s)  Repositioned  -     Row Name 04/07/20 1427          Plan of Care Review    Plan of Care Reviewed With  patient  -     Outcome Summary  Pt admitted with R hip pain and found to have R iliospoas tear and hematoma. PT presents with pain with movement of RLE and difficulty with bed mobility, transfers and ambualtion. Pt would benefit from PT to address these impairments.  -     Row Name 04/07/20 1427          Physical Therapy Clinical Impression    Patient/Family Goals Statement (PT Clinical Impression)  pt plans to return home with her daughter  -     Criteria for Skilled Interventions Met (PT Clinical Impression)  treatment indicated  -     Rehab Potential (PT Clinical Summary)  good, to achieve stated therapy goals  -     Row Name 04/07/20 1427          Positioning and Restraints    Pre-Treatment Position  in bed  -     Post Treatment Position  bed  -     In Bed  fowlers;exit alarm on;encouraged to call for assist;call light within reach;notified nsg  -       User Key  (r) = Recorded By, (t) = Taken By, (c) = Cosigned By    Initials Name Provider Type    KARSON Scott  Kitty Wild, PT Physical Therapist        Outcome Measures     Row Name 04/07/20 1431          How much help from another person do you currently need...    Turning from your back to your side while in flat bed without using bedrails?  2  -KH     Moving from lying on back to sitting on the side of a flat bed without bedrails?  2  -KH     Moving to and from a bed to a chair (including a wheelchair)?  3  -KH     Standing up from a chair using your arms (e.g., wheelchair, bedside chair)?  3  -KH     Climbing 3-5 steps with a railing?  1  -KH     To walk in hospital room?  2  -KH     AM-PAC 6 Clicks Score (PT)  13  -KH     Row Name 04/07/20 1431          Functional Assessment    Outcome Measure Options  AM-PAC 6 Clicks Basic Mobility (PT)  -       User Key  (r) = Recorded By, (t) = Taken By, (c) = Cosigned By    Initials Name Provider Type    Kitty Mac, PT Physical Therapist          PT Recommendation and Plan  Planned Therapy Interventions (PT Eval): balance training, bed mobility training, gait training, home exercise program, strengthening, transfer training, patient/family education  Outcome Summary/Treatment Plan (PT)  Anticipated Discharge Disposition (PT): home with home health, skilled nursing facility  Plan of Care Reviewed With: patient  Outcome Summary: Pt admitted with R hip pain and found to have R iliospoas tear and hematoma. PT presents with pain with movement of RLE and difficulty with bed mobility, transfers and ambualtion. Pt would benefit from PT to address these impairments.     Time Calculation:   PT Charges     Row Name 04/07/20 1417             Time Calculation    Start Time  1125  -      Stop Time  1140  -      Time Calculation (min)  15 min  -      PT Received On  04/07/20  -      PT - Next Appointment  04/08/20  -      PT Goal Re-Cert Due Date  04/14/20  -         Time Calculation- PT    Total Timed Code Minutes- PT  8 minute(s)  -        User Key  (r) =  Recorded By, (t) = Taken By, (c) = Cosigned By    Initials Name Provider Type     Kitty Scott, PT Physical Therapist        Therapy Charges for Today     Code Description Service Date Service Provider Modifiers Qty    74285431808 HC PT EVAL MOD COMPLEXITY 2 4/7/2020 Kitty Scott, PT GP 1    82475706707 HC PT THER PROC EA 15 MIN 4/7/2020 Kitty Scott, PT GP 1          PT G-Codes  Outcome Measure Options: AM-PAC 6 Clicks Basic Mobility (PT)  AM-PAC 6 Clicks Score (PT): 13  AM-PAC 6 Clicks Score (OT): 15    Kitty Scott, PT  4/7/2020

## 2020-04-07 NOTE — PLAN OF CARE
Problem: Patient Care Overview  Goal: Plan of Care Review  4/7/2020 1255 by Stephanie Ceballos, OT  Flowsheets (Taken 4/7/2020 1255)  Outcome Summary: Skilled inpatient OT services to increase pt's ADL independence, functional mobility and activity tolerance.

## 2020-04-07 NOTE — PLAN OF CARE
Problem: Patient Care Overview  Goal: Plan of Care Review  Flowsheets (Taken 4/7/2020 9758)  Outcome Summary: Pt admitted with R hip pain and found to have R iliospoas tear and hematoma. PT presents with pain with movement of RLE and difficulty with bed mobility, transfers and ambualtion. Pt would benefit from PT to address these impairments.

## 2020-04-07 NOTE — THERAPY EVALUATION
Acute Care - Occupational Therapy Initial Evaluation  [facility]     Patient Name: Manuela Gonzalez  : 1923  MRN: [id number]  Today's Date: 2020             Admit Date: 2020       ICD-10-CM ICD-9-CM   1. Right groin pain R10.31 789.03   2. Leukocytosis, unspecified type D72.829 288.60   3. Elevated C-reactive protein (CRP) R79.82 790.95     Patient Active Problem List   Diagnosis   • Traumatic rhabdomyolysis (CMS/HCC)   • Closed bimalleolar fracture of right ankle   • Right groin pain     Past Medical History:   Diagnosis Date   • Hypertension    • Macular degeneration      Past Surgical History:   Procedure Laterality Date   • ANKLE OPEN REDUCTION INTERNAL FIXATION Right 2020    Procedure: BIMALLEOLAR FRACTURE OPEN REDUCTION INTERNAL FIXATION;  Surgeon: Js Montalvo Jr., MD;  Location: [facility] OR;  Service: Orthopedics   • APPENDECTOMY     • DIAGNOSTIC LAPAROSCOPY      for ovarian cyst, when patient was 15 years old          OT ASSESSMENT FLOWSHEET (last 12 hours)      Occupational Therapy Evaluation     Row Name 20 1200                   OT Evaluation Time/Intention    Subjective Information  complains of;pain;dizziness  -RB        Document Type  evaluation  -RB        Mode of Treatment  occupational therapy  -RB        Symptoms Noted During/After Treatment  fatigue  -RB           General Information    Patient Profile Reviewed?  yes  -RB        Patient Observations  alert;cooperative;agree to therapy  -RB        Prior Level of Function  mod assist:;ADL's;dressing;bathing;home management;cooking;cleaning  -RB        Equipment Currently Used at Home  bath bench;commode, 3-in-1;grab bar;walker, standard;wheelchair, motorized  -RB        Existing Precautions/Restrictions  no known precautions/restrictions  -RB        Risks Reviewed  patient:  -RB        Barriers to Rehab  hearing deficit  -RB           Relationship/Environment    Primary Source of Support/Comfort  child(daria)  -RB         Lives With  -- Pt reports living with her daughter and mother (?)  -RB           Resource/Environmental Concerns    Current Living Arrangements  home/apartment/condo  -RB           Cognitive Assessment/Intervention- PT/OT    Orientation Status (Cognition)  oriented to;person;place;situation;time  -RB        Follows Commands (Cognition)  WNL  -RB           Safety Issues, Functional Mobility    Safety Issues Affecting Function (Mobility)  ability to follow commands  -RB           Mobility Assessment/Treatment    Extremity Weight-bearing Status  -- WBAT RLE  -RB           Bed Mobility Assessment/Treatment    Bed Mobility Assessment/Treatment  rolling right;scooting/bridging;supine-sit;sit-supine  -RB        Rolling Right [place] (Bed Mobility)  moderate assist (50% patient effort)  -RB        Scooting/Bridging [place] (Bed Mobility)  moderate assist (50% patient effort)  -RB        Supine-Sit [place] (Bed Mobility)  moderate assist (50% patient effort)  -RB        Sit-Supine [place] (Bed Mobility)  moderate assist (50% patient effort)  -RB        Bed Mobility, Safety Issues  decreased use of legs for bridging/pushing  -RB           Transfer Assessment/Treatment    Transfer Assessment/Treatment  sit-stand transfer;stand-sit transfer Mod A to attempt standing x2. Unable to tolerate full stand  -RB        Maintains Weight-bearing Status (Transfers)  able to maintain  -RB           Sit-Stand Transfer    Sit-Stand [place] (Transfers)  moderate assist (50% patient effort)  -RB           Stand-Sit Transfer    Stand-Sit [place] (Transfers)  moderate assist (50% patient effort)  -RB           ADL Assessment/Intervention    BADL Assessment/Intervention  lower body dressing  -RB           Lower Body Dressing Assessment/Training    Lower Body Dressing [place] Level  socks;moderate assist (50% patient effort)  -RB        Lower Body Dressing Position  edge of bed sitting  -RB           BADL  Safety/Performance    Impairments, BADL Safety/Performance  balance;endurance/activity tolerance;coordination;pain;range of motion;strength  -RB        Skilled BADL Treatment/Intervention  adaptive equipment training;energy conservation;BADL process/adaptation training  -RB           General ROM    RT Lower Ext  -- Impaired RLE ROM 2/2 to pain  -RB           MMT (Manual Muscle Testing)    General MMT Comments  BUE WFL. Proximal RLE weakness 2/2 to pain   -RB           Sensory Assessment/Intervention    Sensory General Assessment  no sensation deficits identified  -RB           Positioning and Restraints    Pre-Treatment Position  in bed  -RB        Post Treatment Position  bed  -RB        In Bed  supine;call light within reach;encouraged to call for assist;exit alarm on  -RB           Pain Assessment    Additional Documentation  Pain Scale 3: Numbers Pre/Post-Treatment (Group)  -RB           Pain Scale 3: Numbers Pre/Post-Treatment    Pain Scale 3: Numbers, Pretreatment  2/10  -RB        Pain Scale 3: Numbers, Post-Treatment  8/10  -RB           Wound 04/05/20 0600 Right anterior;medial toe    Wound - Properties Group Date first assessed: 04/05/20  -MS Time first assessed: 0600  -MS Side: Right  -MS Orientation: anterior;medial  -MS Location: toe  -MS, 2nd        Plan of Care Review    Plan of Care Reviewed With  patient  -RB        Progress  improving  -RB           Clinical Impression (OT)    OT Diagnosis  ' Decreased indepdence with ADL, mobility and endurance  -RB        Criteria for Skilled Therapeutic Interventions Met (OT Eval)  treatment indicated  -RB        Rehab Potential (OT Eval)  good, to achieve stated therapy goals  -RB        Therapy Frequency (OT Eval)  3 times/wk  -RB        Care Plan Review (OT)  evaluation/treatment results reviewed;care plan/treatment goals reviewed;risks/benefits reviewed;current/potential barriers reviewed;patient/other agree to care plan  -RB        Anticipated Discharge  Disposition (OT)  skilled nursing facility  -RB           Planned OT Interventions    Planned Therapy Interventions (OT Eval)  IADL retraining;occupation/activity based interventions;transfer/mobility retraining;strengthening exercise;ROM/therapeutic exercise;patient/caregiver education/training;BADL retraining;activity tolerance training;functional balance retraining  -RB           OT Goals    Bed Mobility Goal Selection (OT)  bed mobility, OT goal 1  -RB        Transfer Goal Selection (OT)  transfer, OT goal 1  -RB        Dressing Goal Selection (OT)  dressing, OT goal 1  -RB           Bed Mobility Goal 1 (OT)    Activity/Assistive Device (Bed Mobility Goal 1, OT)  sit to supine  -RB        [place] Level/Cues Needed (Bed Mobility Goal 1, OT)  supervision required  -RB        Time Frame (Bed Mobility Goal 1, OT)  2 weeks;short term goal (STG)  -RB           Transfer Goal 1 (OT)    Activity/Assistive Device (Transfer Goal 1, OT)  sit-to-stand/stand-to-sit;commode  -RB        [place] Level/Cues Needed (Transfer Goal 1, OT)  minimum assist (75% or more patient effort)  -RB        Time Frame (Transfer Goal 1, OT)  short term goal (STG);2 weeks  -RB           Dressing Goal 1 (OT)    Activity/Assistive Device (Dressing Goal 1, OT)  lower body dressing  -RB        [place]/Cues Needed (Dressing Goal 1, OT)  minimum assist (75% or more patient effort)  -RB        Time Frame (Dressing Goal 1, OT)  2 weeks;short term goal (STG)  -RB          User Key  (r) = Recorded By, (t) = Taken By, (c) = Cosigned By    Initials Name Effective Dates    Gretchen Hylton RN 06/16/16 -     Stephanie Cotton, MJ 04/02/20 -                OT Recommendation and Plan  Outcome Summary/Treatment Plan (OT)  Anticipated Discharge Disposition (OT): skilled nursing facility  Planned Therapy Interventions (OT Eval): IADL retraining, occupation/activity based interventions, transfer/mobility retraining, strengthening exercise,  ROM/therapeutic exercise, patient/caregiver education/training, BADL retraining, activity tolerance training, functional balance retraining  Therapy Frequency (OT Eval): 3 times/wk  Plan of Care Review  Plan of Care Reviewed With: patient  Plan of Care Reviewed With: patient  Outcome Summary: Skilled inpatient OT services to increase pt's ADL independence, functional mobility and activity tolerance.    Outcome Measures     Row Name 04/07/20 1200             How much help from another is currently needed...    Putting on and taking off regular lower body clothing?  2  -RB      Bathing (including washing, rinsing, and drying)  2  -RB      Toileting (which includes using toilet bed pan or urinal)  2  -RB      Putting on and taking off regular upper body clothing  3  -RB      Taking care of personal grooming (such as brushing teeth)  3  -RB      Eating meals  3  -RB      AM-PAC 6 Clicks Score (OT)  15  -RB         Functional Assessment    Outcome Measure Options  AM-PAC 6 Clicks Daily Activity (OT)  -RB        User Key  (r) = Recorded By, (t) = Taken By, (c) = Cosigned By    Initials Name Provider Type    Stephanie Cotton OT Occupational Therapist          Time Calculation:   Time Calculation- OT     Row Name 04/07/20 1300             Time Calculation- OT    OT Start Time  1034  -RB      OT Stop Time  1057  -RB      OT Time Calculation (min)  23 min  -RB      Total Timed Code Minutes- OT  18 minute(s)  -RB      OT Received On  04/07/20  -RB      OT - Next Appointment  04/09/20  -RB      OT Goal Re-Cert Due Date  04/21/20  -RB        User Key  (r) = Recorded By, (t) = Taken By, (c) = Cosigned By    Initials Name Provider Type    Stephanie Cotton OT Occupational Therapist        Therapy Charges for Today     Code Description Service Date Service Provider Modifiers Qty    [id number]  OT EVAL MOD COMPLEXITY 2 4/7/2020 Stephanie Ceballos OT GO 1    [id number]  OT SELF CARE/MGMT/TRAIN EA 15 MIN 4/7/2020  Stephanie Ceballos, OT GO 1               Stephanie Ceballos, OT  4/7/2020

## 2020-04-07 NOTE — PROGRESS NOTES
"  Infectious Diseases Progress Note    Salome Medina MD     [facility]  Los: 1 day  Patient Identification:  Name: Manuela Gonzalez  Age: 89 y.o.  Sex: female  :  1923  MRN: [id number]         Primary Care Physician: Val Crawford MD            Subjective: Still in significant discomfort.  Denies any fever and chills.  Interval History: see consultation note    Objective:    Scheduled Meds:    gabapentin 100 mg Oral Q8H   levothyroxine 125 mcg Oral QAM AC   LORazepam 0.5 mg Intravenous Once   metoprolol succinate XL 12.5 mg Oral Daily   pantoprazole 40 mg Oral Q AM     Continuous Infusions:     Vital signs in last 24 hours:  Temp:  [95.6 °F (35.3 °C)-98.7 °F (37.1 °C)] 98.1 °F (36.7 °C)  Heart Rate:  [61-76] 76  Resp:  [16-20] 16  BP: ()/(45-60) 93/58    Intake/Output:    Intake/Output Summary (Last 24 hours) at 2020 0849  Last data filed at 2020 194  Gross per 24 hour   Intake 410 ml   Output --   Net 410 ml       Exam:  BP 93/58 (BP Location: Right arm, Patient Position: Lying)   Pulse 76   Temp 98.1 °F (36.7 °C) (Oral)   Resp 16   Ht 160 cm (63\")   Wt 75.4 kg (166 lb 3.2 oz)   SpO2 97%   Breastfeeding No   BMI 29.44 kg/m²   Patient is examined using the personal protective equipment as per guidelines from infection control for this particular patient as enacted.  Hand washing was performed before and after patient interaction.  General Appearance:  Comfortable sleepy but arousable in no acute distress                          Head:    Normocephalic, without obvious abnormality, atraumatic                           Eyes:    PERRL, conjunctivae/corneas pale, EOM's intact, both eyes                         Throat:   Lips, tongue, gums normal; oral mucosa pink and moist                           Neck:   Supple, symmetrical, trachea midline, no JVD                         Lungs:    Clear to auscultation bilaterally, respirations unlabored                 Chest Wall:    " No tenderness or deformity                          Heart:    Regular rate and rhythm, S1 and S2 normal                  Abdomen:     Soft, non-tender, bowel sounds active                 Extremities: Right thigh and groin area larger than the left and tender                        Pulses:   Pulses palpable in all extremities                            Skin:   Skin is warm and dry,  no rashes or palpable lesions                  Neurologic: Grossly nonfocal       Data Review:    I reviewed the patient's new clinical results.  Results from last 7 days   Lab Units 04/07/20 0440 04/06/20 0739 04/05/20 0326 04/02/20  1153   WBC 10*3/mm3 10.53 9.57 13.95* 7.66   HEMOGLOBIN g/dL 8.2* 7.7* 10.5* 11.2*   PLATELETS 10*3/mm3 331 347 544* 523*     Results from last 7 days   Lab Units 04/07/20 0440 04/06/20 0739 04/05/20 0326 04/02/20  1153   SODIUM mmol/L 134* 140 138 142   POTASSIUM mmol/L 4.2 4.2 3.9 4.6   CHLORIDE mmol/L 100 102 99 103   CO2 mmol/L 25.7 27.5 25.4 28.8   BUN mg/dL 16 13 10 12   CREATININE mg/dL 0.90 0.86 0.82 0.84   CALCIUM mg/dL 7.9* 8.2 9.0 8.6   GLUCOSE mg/dL 90 82 119* 91     Ct Pelvis Without Contrast    Result Date: 4/2/2020  Degenerative change at the hips and the spine. No acute abnormality is identified. I discussed the case with Dr. Vu of the emergency department at 3 PM.  This report was finalized on 4/2/2020 3:45 PM by Dr. Js Stark M.D.      Nm Bone Scan Limited    Result Date: 4/6/2020  Negative bone scan. I discussed the case with Dr. Vinod Vallecillo  This report was finalized on 4/6/2020 3:56 PM by Dr. Js Stark M.D.      Xr Chest 1 View    Result Date: 4/2/2020  Tiny right pleural effusion. Otherwise negative chest x-ray.  This report was finalized on 4/2/2020 12:46 PM by Dr. Js Stark M.D.      Xr Hip With Or Without Pelvis 2 - 3 View Right    Result Date: 4/5/2020  1. No acute osseous abnormality.  This report was finalized on 4/5/2020 5:40 AM by Kevin Yarbrough M.D.       Microbiology Results (last 10 days)     Procedure Component Value - Date/Time    Blood Culture - Blood, Arm, Left [[id number]] Collected:  04/05/20 1208    Lab Status:  Preliminary result Specimen:  Blood from Arm, Left Updated:  04/06/20 1230     Blood Culture No growth at 24 hours    Blood Culture - Blood, Arm, Right [[id number]] Collected:  04/05/20 1208    Lab Status:  Preliminary result Specimen:  Blood from Arm, Right Updated:  04/06/20 1230     Blood Culture No growth at 24 hours            Assessment:    Right groin pain  1-right hip pain/groin pain due to muscle injury and hematoma likely bland process and highly unlikely it to be infectious in nature given the lack of systemic signs and symptoms of sepsis and essentially bland inflammatory markers- possibility of occult fracture is high as per ortho.  2-immobility due to recent right hip and groin pain  3-status post right ankle fracture and surgery  4-hypertension  5-macular degeneration  6-mild leukocytosis likely due to demargination instead of active infection.  7-mild eosinophilia significance unclear.  8-progressive anemia due to internal bleed - hgb dropped to 7.7.        Recommendations/Discussions:   · I would not recommend any further infectious disease work-up and continue with management of pain and discomfort in the right groin area as per internal medicine and orthopedic surgery service.  Keep an eye on her hemoglobin hematocrit.    · Follow up on bone scan  · Follow-up on blood culture results and further management as her condition evolves.      Salome Medina MD  4/7/2020  08:49    Much of this encounter note is an electronic transcription/translation of spoken language to printed text. The electronic translation of spoken language may permit erroneous, or at times, nonsensical words or phrases to be inadvertently transcribed; Although I have reviewed the note for such errors, some may still exist

## 2020-04-07 NOTE — PLAN OF CARE
Problem: Patient Care Overview  Goal: Plan of Care Review  Outcome: Ongoing (interventions implemented as appropriate)  Flowsheets (Taken 4/7/2020 0398)  Progress: improving  Plan of Care Reviewed With: patient  Outcome Summary: C/O pain with movement. Hgb 8.2 today. Recheck in a.m. No ortho interventions. Several low BP readings before taking over care at 1645.

## 2020-04-07 NOTE — PROGRESS NOTES
"  Orthopaedic Surgery   Daily Progress Note  Dr. Mike Vallecillo Sr  [phone]  DEMOGRAPHICS:   · Manuela Gonzalez   · Age:89 y.o.   · MRN:[id number]  · Admitted: 4/5/2020    PROCEDURE:   s/p * No surgery found *     BP 93/58 (BP Location: Right arm, Patient Position: Lying)   Pulse 76   Temp 98.1 °F (36.7 °C) (Oral)   Resp 16   Ht 160 cm (63\")   Wt 75.4 kg (166 lb 3.2 oz)   SpO2 97%   Breastfeeding No   BMI 29.44 kg/m²     Lab Results (last 24 hours)     Procedure Component Value Units Date/Time    Basic Metabolic Panel [[id number]]  (Abnormal) Collected:  04/07/20 0440    Specimen:  Blood Updated:  04/07/20 0526     Glucose 90 mg/dL      BUN 16 mg/dL      Creatinine 0.90 mg/dL      Sodium 134 mmol/L      Potassium 4.2 mmol/L      Chloride 100 mmol/L      CO2 25.7 mmol/L      Calcium 7.9 mg/dL      eGFR Non African Amer 58 mL/min/1.73      BUN/Creatinine Ratio 17.8     Anion Gap 8.3 mmol/L     Narrative:       GFR Normal >60  Chronic Kidney Disease <60  Kidney Failure <15      CBC & Differential [[id number]] Collected:  04/07/20 0440    Specimen:  Blood Updated:  04/07/20 0518    Narrative:       The following orders were created for panel order CBC & Differential.  Procedure                               Abnormality         Status                     ---------                               -----------         ------                     CBC Auto Differential[[id number]]        Abnormal            Final result                 Please view results for these tests on the individual orders.    CBC Auto Differential [[id number]]  (Abnormal) Collected:  04/07/20 0440    Specimen:  Blood Updated:  04/07/20 0518     WBC 10.53 10*3/mm3      RBC 2.86 10*6/mm3      Hemoglobin 8.2 g/dL      Hematocrit 24.9 %      MCV 87.1 fL      MCH 28.7 pg      MCHC 32.9 g/dL      RDW 14.7 %      RDW-SD 47.2 fl      MPV 9.5 fL      Platelets 331 10*3/mm3      Neutrophil % 62.5 %      Lymphocyte % 19.3 %      Monocyte % 9.8 %      " Eosinophil % 7.2 %      Basophil % 0.6 %      Immature Grans % 0.6 %      Neutrophils, Absolute 6.59 10*3/mm3      Lymphocytes, Absolute 2.03 10*3/mm3      Monocytes, Absolute 1.03 10*3/mm3      Eosinophils, Absolute 0.76 10*3/mm3      Basophils, Absolute 0.06 10*3/mm3      Immature Grans, Absolute 0.06 10*3/mm3      nRBC 0.0 /100 WBC     Blood Culture - Blood, Arm, Left [[id number]] Collected:  04/05/20 1208    Specimen:  Blood from Arm, Left Updated:  04/06/20 1230     Blood Culture No growth at 24 hours    Blood Culture - Blood, Arm, Right [[id number]] Collected:  04/05/20 1208    Specimen:  Blood from Arm, Right Updated:  04/06/20 1230     Blood Culture No growth at 24 hours    Hemoglobin A1c [[id number]]  (Abnormal) Collected:  04/06/20 0739    Specimen:  Blood Updated:  04/06/20 0923     Hemoglobin A1C 4.70 %     Narrative:       Hemoglobin A1C Ranges:    Increased Risk for Diabetes  5.7% to 6.4%  Diabetes                     >= 6.5%  Diabetic Goal                < 7.0%    BNP [[id number]]  (Abnormal) Collected:  04/06/20 0739    Specimen:  Blood from Arm, Right Updated:  04/06/20 0855     proBNP 4,670.0 pg/mL     Narrative:       Among patients with dyspnea, NT-proBNP is highly sensitive for the detection of acute congestive heart failure. In addition NT-proBNP of <300 pg/ml effectively rules out acute congestive heart failure with 99% negative predictive value.    Results may be falsely decreased if patient taking Biotin.      TSH [[id number]]  (Abnormal) Collected:  04/06/20 0739    Specimen:  Blood from Arm, Right Updated:  04/06/20 0855     TSH 6.270 uIU/mL     Comprehensive Metabolic Panel [[id number]]  (Abnormal) Collected:  04/06/20 0739    Specimen:  Blood from Arm, Right Updated:  04/06/20 0848     Glucose 82 mg/dL      BUN 13 mg/dL      Creatinine 0.86 mg/dL      Sodium 140 mmol/L      Potassium 4.2 mmol/L      Chloride 102 mmol/L      CO2 27.5 mmol/L      Calcium 8.2 mg/dL      Total Protein 5.0 g/dL       Albumin 2.70 g/dL      ALT (SGPT) 5 U/L      AST (SGOT) 13 U/L      Alkaline Phosphatase 61 U/L      Total Bilirubin 0.5 mg/dL      eGFR Non African Amer 61 mL/min/1.73      Globulin 2.3 gm/dL      A/G Ratio 1.2 g/dL      BUN/Creatinine Ratio 15.1     Anion Gap 10.5 mmol/L     Narrative:       GFR Normal >60  Chronic Kidney Disease <60  Kidney Failure <15      Lipid Panel [[id number]]  (Abnormal) Collected:  04/06/20 0739    Specimen:  Blood from Arm, Right Updated:  04/06/20 0848     Total Cholesterol 110 mg/dL      Triglycerides 52 mg/dL      HDL Cholesterol 61 mg/dL      LDL Cholesterol  39 mg/dL      VLDL Cholesterol 10.4 mg/dL      LDL/HDL Ratio 0.63    Narrative:       Cholesterol Reference Ranges  (U.S. Department of Health and Human Services ATP III Classifications)    Desirable          <200 mg/dL  Borderline High    200-239 mg/dL  High Risk          >240 mg/dL      Triglyceride Reference Ranges  (U.S. Department of Health and Human Services ATP III Classifications)    Normal           <150 mg/dL  Borderline High  150-199 mg/dL  High             200-499 mg/dL  Very High        >500 mg/dL    HDL Reference Ranges  (U.S. Department of Health and Human Services ATP III Classifcations)    Low     <40 mg/dl (major risk factor for CHD)  High    >60 mg/dl ('negative' risk factor for CHD)        LDL Reference Ranges  (U.S. Department of Health and Human Services ATP III Classifcations)    Optimal          <100 mg/dL  Near Optimal     100-129 mg/dL  Borderline High  130-159 mg/dL  High             160-189 mg/dL  Very High        >189 mg/dL    CK [[id number]]  (Normal) Collected:  04/06/20 0739    Specimen:  Blood from Arm, Right Updated:  04/06/20 0848     Creatine Kinase 153 U/L           Imaging Results (Last 24 Hours)     Procedure Component Value Units Date/Time    NM Bone Scan Limited [[id number]] Collected:  04/06/20 1411     Updated:  04/06/20 1559    Narrative:       LIMITED BONE SCAN     HISTORY: Severe right  hip pain.     FINDINGS: Pelvis CT 04/02/2020 was essentially negative. MRI of the  pelvis and right hip performed yesterday showed a hematoma anterior to  the right hip with what appears to be a complete tear of the distal  right iliopsoas tendon from the lesser trochanter, new in the interval  since 04/02/2020. The MRI did not demonstrate any evidence of fracture.     TECHNIQUE: 23 mCi of technetium MDP was given intravenously and at 3  hours and AP image of the pelvis and the proximal thighs was acquired.     FINDINGS: There is normal radiotracer activity in the urinary bladder  and in the soft tissues. Normal osseous activity is observed throughout  the pelvis, proximal femurs, and visualized lower lumbar spine. There is  no scintigraphic evidence of fracture or tumor.       Impression:       Negative bone scan. I discussed the case with Dr. Vinod Vallecillo     This report was finalized on 4/6/2020 3:56 PM by Dr. Js Stark M.D.             Patient Care Team:  Val Crawford MD as PCP - General (Internal Medicine)    SUBJECTIVE  Still has some hip soreness    PHYSICAL EXAM  Sickle exam really unchanged.  Still has right hip soreness     ASSESSMENT:   1.  All studies including x-ray, CT scan, MRI of the hip, and bone scan indicate there is no fracture.  She has an iliopsoas tendon tear and a subsequent hematoma.  2.  Hemoglobin is now stable.  It is 8.2 today    PLAN / DISPOSITION:  I discussed the situation with her daughter.  No surgical intervention is required.  We need to monitor her hemoglobin.  The hematoma should resorb spontaneously.  She may mobilize and weight-bear as tolerated.  I am okay with discharge whenever we are certain her hemoglobin is stable.    Mike Vallecillo Sr, MD  Orthopaedic Surgery  [facility]  [phone]

## 2020-04-07 NOTE — PROGRESS NOTES
"Daily progress note    Chief complaint  Doing little better  Still with a lot of pain    History of present illness  89-year-old white female with history of hypertension macular degeneration and severe osteoarthritis [facility] emergency room with right groin and right hip pain.  Patient has been evaluated in ER which showed no evidence of any fracture but she is in a lot of pain admit for further management.  Patient is poor historian but she is in a lot of pain crying admit for management.  No recent fall trauma injury per nursing staff.  Also no fever chills night sweats weight loss or weight gain.     REVIEW OF SYSTEMS  Unremarkable except for pain     PHYSICAL EXAM  Blood pressure (!) 81/51, pulse 77, temperature 97.8 °F (36.6 °C), temperature source Oral, resp. rate 16, height 160 cm (63\"), weight 75.4 kg (166 lb 3.2 oz), SpO2 94 %, not currently breastfeeding.    Constitutional: She is well-developed, well-nourished, and in no distress.   Head: Normocephalic.   Mouth/Throat: Mucous membranes are normal.   Eyes: No scleral icterus.   Neck: Normal range of motion.   Cardiovascular: Normal rate, regular rhythm and normal heart sounds.   Pulses:Dorsalis pedis pulses are 2+ on the right side, and 2+ on the left side. Posterior tibial pulses are 2+ on the right side, and 2+ on the left side.   Pulmonary/Chest: Effort normal and breath sounds normal.   Musculoskeletal: Normal range of motion. No erythema or signs of infection noted to right groin   Neurological: She is alert.   Skin: Skin is warm and dry.   Psychiatric: Mood and affect normal.     LAB RESULTS  Lab Results (last 24 hours)     Procedure Component Value Units Date/Time    Basic Metabolic Panel [[id number]]  (Abnormal) Collected:  04/07/20 0440    Specimen:  Blood Updated:  04/07/20 0526     Glucose 90 mg/dL      BUN 16 mg/dL      Creatinine 0.90 mg/dL      Sodium 134 mmol/L      Potassium 4.2 mmol/L      Chloride 100 mmol/L      " CO2 25.7 mmol/L      Calcium 7.9 mg/dL      eGFR Non African Amer 58 mL/min/1.73      BUN/Creatinine Ratio 17.8     Anion Gap 8.3 mmol/L     Narrative:       GFR Normal >60  Chronic Kidney Disease <60  Kidney Failure <15      CBC & Differential [[id number]] Collected:  04/07/20 0440    Specimen:  Blood Updated:  04/07/20 0518    Narrative:       The following orders were created for panel order CBC & Differential.  Procedure                               Abnormality         Status                     ---------                               -----------         ------                     CBC Auto Differential[[id number]]        Abnormal            Final result                 Please view results for these tests on the individual orders.    CBC Auto Differential [[id number]]  (Abnormal) Collected:  04/07/20 0440    Specimen:  Blood Updated:  04/07/20 0518     WBC 10.53 10*3/mm3      RBC 2.86 10*6/mm3      Hemoglobin 8.2 g/dL      Hematocrit 24.9 %      MCV 87.1 fL      MCH 28.7 pg      MCHC 32.9 g/dL      RDW 14.7 %      RDW-SD 47.2 fl      MPV 9.5 fL      Platelets 331 10*3/mm3      Neutrophil % 62.5 %      Lymphocyte % 19.3 %      Monocyte % 9.8 %      Eosinophil % 7.2 %      Basophil % 0.6 %      Immature Grans % 0.6 %      Neutrophils, Absolute 6.59 10*3/mm3      Lymphocytes, Absolute 2.03 10*3/mm3      Monocytes, Absolute 1.03 10*3/mm3      Eosinophils, Absolute 0.76 10*3/mm3      Basophils, Absolute 0.06 10*3/mm3      Immature Grans, Absolute 0.06 10*3/mm3      nRBC 0.0 /100 WBC     Blood Culture - Blood, Arm, Left [[id number]] Collected:  04/05/20 1208    Specimen:  Blood from Arm, Left Updated:  04/06/20 1230     Blood Culture No growth at 24 hours    Blood Culture - Blood, Arm, Right [[id number]] Collected:  04/05/20 1208    Specimen:  Blood from Arm, Right Updated:  04/06/20 1230     Blood Culture No growth at 24 hours        Imaging Results (Last 24 Hours)     Procedure Component Value Units Date/Time    NM Bone  Scan Limited [[id number]] Collected:  04/06/20 1411     Updated:  04/06/20 1559    Narrative:       LIMITED BONE SCAN     HISTORY: Severe right hip pain.     FINDINGS: Pelvis CT 04/02/2020 was essentially negative. MRI of the  pelvis and right hip performed yesterday showed a hematoma anterior to  the right hip with what appears to be a complete tear of the distal  right iliopsoas tendon from the lesser trochanter, new in the interval  since 04/02/2020. The MRI did not demonstrate any evidence of fracture.     TECHNIQUE: 23 mCi of technetium MDP was given intravenously and at 3  hours and AP image of the pelvis and the proximal thighs was acquired.     FINDINGS: There is normal radiotracer activity in the urinary bladder  and in the soft tissues. Normal osseous activity is observed throughout  the pelvis, proximal femurs, and visualized lower lumbar spine. There is  no scintigraphic evidence of fracture or tumor.       Impression:       Negative bone scan. I discussed the case with Dr. Vinod Vallecillo     This report was finalized on 4/6/2020 3:56 PM by Dr. Js Stark M.D.           Current Facility-Administered Medications:   •  gabapentin (NEURONTIN) capsule 100 mg, 100 mg, Oral, Q8H, Jimbo Guzman MD, 100 mg at 04/07/20 0613  •  HYDROcodone-acetaminophen (NORCO) 5-325 MG per tablet 1 tablet, 1 tablet, Oral, Q6H PRN, Jimbo Guzman MD, 1 tablet at 04/07/20 0716  •  levothyroxine (SYNTHROID, LEVOTHROID) tablet 125 mcg, 125 mcg, Oral, QAM AC, Jimbo Guzman MD, 125 mcg at 04/07/20 0716  •  LORazepam (ATIVAN) injection 0.5 mg, 0.5 mg, Intravenous, Once, Jmibo Guzman MD  •  metoprolol succinate XL (TOPROL-XL) 24 hr tablet 12.5 mg, 12.5 mg, Oral, Daily, Jimbo Guzman MD  •  ondansetron (ZOFRAN) injection 4 mg, 4 mg, Intravenous, Q6H PRN, Jimbo Guzman MD  •  pantoprazole (PROTONIX) EC tablet 40 mg, 40 mg, Oral, Q AM, Jimbo Guzman MD, 40 mg at 04/07/20 0613     ASSESSMENT  Right hip pain with edema and inflammation  hematoma and muscle injury  Hypertension  Osteoarthritis  Macular degeneration  Dementia  Chronic anemia   Acute blood loss anemia  Gastroesophageal reflux disease    PLAN  CPM  Pain management  Orthopedic surgery consult appreciated  Continue home medications  Stress ulcer DVT prophylaxis  Supportive care  PT OT  DNR  Discharge planning    LIZ DUNLAP MD

## 2020-04-08 NOTE — PROGRESS NOTES
"Daily progress note    Chief complaint  Doing better  Still with a lot of pain    History of present illness  89-year-old white female with history of hypertension macular degeneration and severe osteoarthritis presented to [facility] emergency room with right groin and right hip pain.  Patient has been evaluated in ER which showed no evidence of any fracture but she is in a lot of pain admit for further management.  Patient is poor historian but she is in a lot of pain crying admit for management.  No recent fall trauma injury per nursing staff.  Also no fever chills night sweats weight loss or weight gain.     REVIEW OF SYSTEMS  Unremarkable except for pain     PHYSICAL EXAM  Blood pressure 113/60, pulse 72, temperature 97 °F (36.1 °C), temperature source Oral, resp. rate 18, height 160 cm (63\"), weight 75.4 kg (166 lb 3.2 oz), SpO2 93 %, not currently breastfeeding.    Constitutional: She is well-developed, well-nourished, and in no distress.   Head: Normocephalic.   Mouth/Throat: Mucous membranes are normal.   Eyes: No scleral icterus.   Neck: Normal range of motion.   Cardiovascular: Normal rate, regular rhythm and normal heart sounds.   Pulses:Dorsalis pedis pulses are 2+ on the right side, and 2+ on the left side. Posterior tibial pulses are 2+ on the right side, and 2+ on the left side.   Pulmonary/Chest: Effort normal and breath sounds normal.   Musculoskeletal: Normal range of motion. No erythema or signs of infection noted to right groin   Neurological: She is alert.   Skin: Skin is warm and dry.   Psychiatric: Mood and affect normal.     LAB RESULTS  Lab Results (last 24 hours)     Procedure Component Value Units Date/Time    Blood Culture - Blood, Arm, Left [[id number]] Collected:  04/05/20 1208    Specimen:  Blood from Arm, Left Updated:  04/08/20 1231     Blood Culture No growth at 3 days    Blood Culture - Blood, Arm, Right [[id number]] Collected:  04/05/20 1208    Specimen:  Blood from Arm, " Right Updated:  04/08/20 1231     Blood Culture No growth at 3 days    Basic Metabolic Panel [[id number]]  (Abnormal) Collected:  04/08/20 0424    Specimen:  Blood Updated:  04/08/20 0525     Glucose 98 mg/dL      BUN 16 mg/dL      Creatinine 0.93 mg/dL      Sodium 134 mmol/L      Potassium 4.2 mmol/L      Chloride 98 mmol/L      CO2 28.1 mmol/L      Calcium 8.1 mg/dL      eGFR Non African Amer 56 mL/min/1.73      BUN/Creatinine Ratio 17.2     Anion Gap 7.9 mmol/L     Narrative:       GFR Normal >60  Chronic Kidney Disease <60  Kidney Failure <15      CBC & Differential [[id number]] Collected:  04/08/20 0424    Specimen:  Blood Updated:  04/08/20 0457    Narrative:       The following orders were created for panel order CBC & Differential.  Procedure                               Abnormality         Status                     ---------                               -----------         ------                     CBC Auto Differential[[id number]]        Abnormal            Final result                 Please view results for these tests on the individual orders.    CBC Auto Differential [[id number]]  (Abnormal) Collected:  04/08/20 0424    Specimen:  Blood Updated:  04/08/20 0457     WBC 10.67 10*3/mm3      RBC 3.03 10*6/mm3      Hemoglobin 8.8 g/dL      Hematocrit 26.3 %      MCV 86.8 fL      MCH 29.0 pg      MCHC 33.5 g/dL      RDW 14.4 %      RDW-SD 45.9 fl      MPV 9.3 fL      Platelets 351 10*3/mm3      Neutrophil % 61.4 %      Lymphocyte % 19.9 %      Monocyte % 8.2 %      Eosinophil % 9.4 %      Basophil % 0.6 %      Immature Grans % 0.5 %      Neutrophils, Absolute 6.57 10*3/mm3      Lymphocytes, Absolute 2.12 10*3/mm3      Monocytes, Absolute 0.87 10*3/mm3      Eosinophils, Absolute 1.00 10*3/mm3      Basophils, Absolute 0.06 10*3/mm3      Immature Grans, Absolute 0.05 10*3/mm3      nRBC 0.0 /100 WBC     T4, Free [[id number]]  (Normal) Collected:  04/07/20 1413    Specimen:  Blood from Arm, Right Updated:   04/07/20 1501     Free T4 1.12 ng/dL     Narrative:       Results may be falsely increased if patient taking Biotin.          Imaging Results (Last 24 Hours)     Procedure Component Value Units Date/Time    CT Lower Extremity Right Without Contrast [[id number]] Collected:  04/08/20 0911     Updated:  04/08/20 0919    Narrative:       CT RIGHT HIP WITHOUT CONTRAST     HISTORY: 89-year-old female with history of recent fall and right hip  pain.     TECHNIQUE: CT includes axial imaging through the pelvis/right hip and  this data was reconstructed in coronal and sagittal planes. No contrast  administered.     COMPARISON: Limited bone scan right hip 04/06/2020, MRI right hip  04/05/2020, CT pelvis 04/02/2020.     FINDINGS: There is enlargement of the right iliacus muscle, right  iliopsoas muscle that is new when compared to CT 6 days ago and similar  to MRI 3 days ago. There is hyperdense material extending within and  anterior to the right iliopsoas muscle representing a hematoma and this  measures approximately 5.5 x 4.4 cm axial dimension at the horizontal  level of the lesser trochanter and extends 11 cm in length and this is  similar to the MRI 3 days ago. There is an iliopsoas insertional tear.  Generalized edema is present within and surrounding the musculature  about the right hip and proximal thigh. No fracture is evident. There  are chronic degenerative changes at the hips and lower lumbar spine.       Impression:       Right iliopsoas insertional tear with adjacent hematoma  measuring approximately 11 x 6 x 4 cm that is similar in size to MRI 3  days ago. Enlargement of the right iliopsoas and iliacus musculature  associated with intramuscular hemorrhage. Presacral edema. These  findings are new when compared to CT 6 days ago. No fracture.     Radiation dose reduction techniques were utilized, including automated  exposure control and exposure modulation based on body size.     This report was finalized on  4/8/2020 9:16 AM by Dr. Martin Soto M.D.           Current Facility-Administered Medications:   •  gabapentin (NEURONTIN) capsule 100 mg, 100 mg, Oral, Q8H, Liz Guzman MD, 100 mg at 04/08/20 0537  •  HYDROcodone-acetaminophen (NORCO) 5-325 MG per tablet 1 tablet, 1 tablet, Oral, Q4H PRN, 1 tablet at 04/08/20 1205 **OR** HYDROcodone-acetaminophen (NORCO)  MG per tablet 1 tablet, 1 tablet, Oral, Q4H PRN, Liz Guzman MD  •  levothyroxine (SYNTHROID, LEVOTHROID) tablet 125 mcg, 125 mcg, Oral, QAM AC, Liz Guzman MD, 125 mcg at 04/08/20 0537  •  metoprolol succinate XL (TOPROL-XL) 24 hr tablet 12.5 mg, 12.5 mg, Oral, Daily, Liz Guzman MD  •  ondansetron (ZOFRAN) injection 4 mg, 4 mg, Intravenous, Q6H PRN, Liz Guzman MD  •  pantoprazole (PROTONIX) EC tablet 40 mg, 40 mg, Oral, Q AM, Liz Guzman MD, 40 mg at 04/08/20 0537     ASSESSMENT  Right hip pain with edema and inflammation hematoma and muscle injury  Hypertension  Osteoarthritis  Macular degeneration  Dementia  Chronic anemia   Acute blood loss anemia  Gastroesophageal reflux disease    PLAN  CPM  Pain management  Orthopedic surgery consult appreciated  Continue home medications  Stress ulcer DVT prophylaxis  Supportive care  PT OT  DNR  Discharge planning    LIZ GUZMAN MD

## 2020-04-08 NOTE — PROGRESS NOTES
Continued Stay Note  [facility]     Patient Name: Manuela Gonzalez  MRN: [id number]  Today's Date: 4/8/2020    Admit Date: 4/5/2020    Discharge Plan     Row Name 04/08/20 1206       Plan    Plan  Eval pending for [facility]    Plan Comments  Kitty/Sg advised she was unable to verify Pt insurance was in network with their facility.  Dtr Seda next choice for SNF is [facility].  Referral called to [facility] [phone] at 12:07 PM.  CCP following.  SELENA SKINNER/CCP    Row Name 04/08/20 7331       Plan    Plan  Awaiting [facility] reply of if they accept Pt insurance or not.      Plan Comments  CCP spoke with Pt daughter Seda [phone] via phone to confirm she wanted Pt to go to [facility].  Seda confirmed she did.  CCP called [facility] [place] [phone] at 10:47 AM and she advised Pt approved but she is not for sure if they have a contract with patients insurance, Sandhya Tang advised she was going to call Sandhya to find out.  CCP then called Pt daughter Seda back to advise [facility] was confirming Pt insurance.  Seda advised she wants Pt to go to [facility] if [facility] cannot accept.  CCP following.  SELENA SKINNER/CCP        Discharge Codes    No documentation.             Martha Darenll RN

## 2020-04-08 NOTE — PROGRESS NOTES
"  Orthopaedic Surgery   Daily Progress Note  Dr. Mike Vallecillo   [phone]  DEMOGRAPHICS:   · Manuela Gonzalez   · Age:89 y.o.   · MRN:[id number]  · Admitted: 4/5/2020    PROCEDURE:   s/p * No surgery found *     /60 (BP Location: Right arm, Patient Position: Lying)   Pulse 72   Temp 97 °F (36.1 °C) (Oral)   Resp 18   Ht 160 cm (63\")   Wt 75.4 kg (166 lb 3.2 oz)   SpO2 97%   Breastfeeding No   BMI 29.44 kg/m²     Lab Results (last 24 hours)     Procedure Component Value Units Date/Time    Basic Metabolic Panel [[id number]]  (Abnormal) Collected:  04/08/20 0424    Specimen:  Blood Updated:  04/08/20 0525     Glucose 98 mg/dL      BUN 16 mg/dL      Creatinine 0.93 mg/dL      Sodium 134 mmol/L      Potassium 4.2 mmol/L      Chloride 98 mmol/L      CO2 28.1 mmol/L      Calcium 8.1 mg/dL      eGFR Non African Amer 56 mL/min/1.73      BUN/Creatinine Ratio 17.2     Anion Gap 7.9 mmol/L     Narrative:       GFR Normal >60  Chronic Kidney Disease <60  Kidney Failure <15      CBC & Differential [[id number]] Collected:  04/08/20 0424    Specimen:  Blood Updated:  04/08/20 0457    Narrative:       The following orders were created for panel order CBC & Differential.  Procedure                               Abnormality         Status                     ---------                               -----------         ------                     CBC Auto Differential[[id number]]        Abnormal            Final result                 Please view results for these tests on the individual orders.    CBC Auto Differential [[id number]]  (Abnormal) Collected:  04/08/20 0424    Specimen:  Blood Updated:  04/08/20 0457     WBC 10.67 10*3/mm3      RBC 3.03 10*6/mm3      Hemoglobin 8.8 g/dL      Hematocrit 26.3 %      MCV 86.8 fL      MCH 29.0 pg      MCHC 33.5 g/dL      RDW 14.4 %      RDW-SD 45.9 fl      MPV 9.3 fL      Platelets 351 10*3/mm3      Neutrophil % 61.4 %      Lymphocyte % 19.9 %      Monocyte % 8.2 %      Eosinophil " % 9.4 %      Basophil % 0.6 %      Immature Grans % 0.5 %      Neutrophils, Absolute 6.57 10*3/mm3      Lymphocytes, Absolute 2.12 10*3/mm3      Monocytes, Absolute 0.87 10*3/mm3      Eosinophils, Absolute 1.00 10*3/mm3      Basophils, Absolute 0.06 10*3/mm3      Immature Grans, Absolute 0.05 10*3/mm3      nRBC 0.0 /100 WBC     T4, Free [[id number]]  (Normal) Collected:  04/07/20 1413    Specimen:  Blood from Arm, Right Updated:  04/07/20 1501     Free T4 1.12 ng/dL     Narrative:       Results may be falsely increased if patient taking Biotin.      Blood Culture - Blood, Arm, Left [[id number]] Collected:  04/05/20 1208    Specimen:  Blood from Arm, Left Updated:  04/07/20 1230     Blood Culture No growth at 2 days    Blood Culture - Blood, Arm, Right [[id number]] Collected:  04/05/20 1208    Specimen:  Blood from Arm, Right Updated:  04/07/20 1230     Blood Culture No growth at 2 days          Imaging Results (Last 24 Hours)     Procedure Component Value Units Date/Time    CT Lower Extremity Right Without Contrast [[id number]] Collected:  04/08/20 0911     Updated:  04/08/20 0919    Narrative:       CT RIGHT HIP WITHOUT CONTRAST     HISTORY: 89-year-old female with history of recent fall and right hip  pain.     TECHNIQUE: CT includes axial imaging through the pelvis/right hip and  this data was reconstructed in coronal and sagittal planes. No contrast  administered.     COMPARISON: Limited bone scan right hip 04/06/2020, MRI right hip  04/05/2020, CT pelvis 04/02/2020.     FINDINGS: There is enlargement of the right iliacus muscle, right  iliopsoas muscle that is new when compared to CT 6 days ago and similar  to MRI 3 days ago. There is hyperdense material extending within and  anterior to the right iliopsoas muscle representing a hematoma and this  measures approximately 5.5 x 4.4 cm axial dimension at the horizontal  level of the lesser trochanter and extends 11 cm in length and this is  similar to the MRI 3 days  ago. There is an iliopsoas insertional tear.  Generalized edema is present within and surrounding the musculature  about the right hip and proximal thigh. No fracture is evident. There  are chronic degenerative changes at the hips and lower lumbar spine.       Impression:       Right iliopsoas insertional tear with adjacent hematoma  measuring approximately 11 x 6 x 4 cm that is similar in size to MRI 3  days ago. Enlargement of the right iliopsoas and iliacus musculature  associated with intramuscular hemorrhage. Presacral edema. These  findings are new when compared to CT 6 days ago. No fracture.     Radiation dose reduction techniques were utilized, including automated  exposure control and exposure modulation based on body size.     This report was finalized on 4/8/2020 9:16 AM by Dr. Martin Soto M.D.             Patient Care Team:  Val Crawford MD as PCP - General (Internal Medicine)    SUBJECTIVE  STATUS QUO    PHYSICAL EXAM  UNCHANGED    ASSESSMENT:  CT scan today shows no change in the hematoma relative to the size demonstrated on her recent MRI.  The iliopsoas tendon tear is visualized.    PLAN / DISPOSITION:  At this point I think things are stable.  The hematoma is not enlarging.  There is no fracture present.    I think it is safe to discharge her whenever medically ready.  We did not need to keep her in the hospital for her anterior hip hematoma any longer.  This should resorb spontaneously.  It is difficult to evaluate pain in this patient with dementia.    Mike Vallecillo Sr, MD  Orthopaedic Surgery  [facility]  [phone]

## 2020-04-08 NOTE — PROGRESS NOTES
"  Orthopaedic Surgery   Daily Progress Note  Dr. Mike Vallecillo Sr  [phone]  DEMOGRAPHICS:   · Manuela Gonzalez   · Age:89 y.o.   · MRN:[id number]  · Admitted: 4/5/2020    PROCEDURE:   s/p * No surgery found *     BP 96/50 (BP Location: Right arm, Patient Position: Lying)   Pulse 71   Temp 98.8 °F (37.1 °C) (Oral)   Resp 16   Ht 160 cm (63\")   Wt 75.4 kg (166 lb 3.2 oz)   SpO2 97%   Breastfeeding No   BMI 29.44 kg/m²     Lab Results (last 24 hours)     Procedure Component Value Units Date/Time    Basic Metabolic Panel [[id number]]  (Abnormal) Collected:  04/08/20 0424    Specimen:  Blood Updated:  04/08/20 0525     Glucose 98 mg/dL      BUN 16 mg/dL      Creatinine 0.93 mg/dL      Sodium 134 mmol/L      Potassium 4.2 mmol/L      Chloride 98 mmol/L      CO2 28.1 mmol/L      Calcium 8.1 mg/dL      eGFR Non African Amer 56 mL/min/1.73      BUN/Creatinine Ratio 17.2     Anion Gap 7.9 mmol/L     Narrative:       GFR Normal >60  Chronic Kidney Disease <60  Kidney Failure <15      CBC & Differential [[id number]] Collected:  04/08/20 0424    Specimen:  Blood Updated:  04/08/20 0457    Narrative:       The following orders were created for panel order CBC & Differential.  Procedure                               Abnormality         Status                     ---------                               -----------         ------                     CBC Auto Differential[[id number]]        Abnormal            Final result                 Please view results for these tests on the individual orders.    CBC Auto Differential [[id number]]  (Abnormal) Collected:  04/08/20 0424    Specimen:  Blood Updated:  04/08/20 0457     WBC 10.67 10*3/mm3      RBC 3.03 10*6/mm3      Hemoglobin 8.8 g/dL      Hematocrit 26.3 %      MCV 86.8 fL      MCH 29.0 pg      MCHC 33.5 g/dL      RDW 14.4 %      RDW-SD 45.9 fl      MPV 9.3 fL      Platelets 351 10*3/mm3      Neutrophil % 61.4 %      Lymphocyte % 19.9 %      Monocyte % 8.2 %      " Eosinophil % 9.4 %      Basophil % 0.6 %      Immature Grans % 0.5 %      Neutrophils, Absolute 6.57 10*3/mm3      Lymphocytes, Absolute 2.12 10*3/mm3      Monocytes, Absolute 0.87 10*3/mm3      Eosinophils, Absolute 1.00 10*3/mm3      Basophils, Absolute 0.06 10*3/mm3      Immature Grans, Absolute 0.05 10*3/mm3      nRBC 0.0 /100 WBC     T4, Free [[id number]]  (Normal) Collected:  04/07/20 1413    Specimen:  Blood from Arm, Right Updated:  04/07/20 1501     Free T4 1.12 ng/dL     Narrative:       Results may be falsely increased if patient taking Biotin.      Blood Culture - Blood, Arm, Left [[id number]] Collected:  04/05/20 1208    Specimen:  Blood from Arm, Left Updated:  04/07/20 1230     Blood Culture No growth at 2 days    Blood Culture - Blood, Arm, Right [[id number]] Collected:  04/05/20 1208    Specimen:  Blood from Arm, Right Updated:  04/07/20 1230     Blood Culture No growth at 2 days          Imaging Results (Last 24 Hours)     ** No results found for the last 24 hours. **          Patient Care Team:  Val Crawford MD as PCP - General (Internal Medicine)    SUBJECTIVE  Still complaining of extreme pain in her right groin    PHYSICAL EXAM  Very difficult to evaluate.  When I simply lifted the sheet off of her hip she screamed in pain.  It is difficult to determine how much pain she is really in.     ASSESSMENT:   Hematoma right anterior hip/groin associated with a iliopsoas tendon tear which occurred spontaneously.  No fractures are present on a very thorough work-up.  She is still in quite a bit of pain, but as noted very difficult to evaluate.  Her hemoglobin is now stable and there is no apparent ongoing bleeding.    PLAN / DISPOSITION:  This still does not look like a surgical problem.  At her age and with her anesthesia risks, hematoma evacuation should not be necessary as this should resorb on its own.  I am unable to explain her pain, though as I have noted this is very difficult to evaluate and  she was screaming today when I simply lifted the sheet.  I am going to try to repeat her CT scan to make sure this is not expanding, though there is no clinical evidence that it is.  It is always difficult to get her to cooperate though with this type of a test.    Mike Vallecillo Sr, MD  Orthopaedic Surgery  [facility]  [phone]

## 2020-04-08 NOTE — PLAN OF CARE
Problem: Patient Care Overview  Goal: Plan of Care Review  Outcome: Ongoing (interventions implemented as appropriate)   Med for pain, turned q2h, incont, pale, confused at times, [place], limited vision

## 2020-04-08 NOTE — PROGRESS NOTES
Continued Stay Note  [facility]     Patient Name: Manuela Gonzalez  MRN: [id number]  Today's Date: 4/8/2020    Admit Date: 4/5/2020    Discharge Plan     Row Name 04/08/20 1128       Plan    Plan  Awaiting \[facility]\"" reply of if they accept Pt insurance or not.      Plan Comments  [facility] spoke with Pt daughter Seda [phone] via phone to confirm she wanted Pt to go to [facility].  Seda confirmed she did.  [facility] called Kitty[facility]Sg [facility] [phone] at 10:47 AM and she advised Pt approved but she is not for sure if they have a contract with patients insurance, Sandhya Tang advised she was going to call Sandhya to find out.  [facility] then called Pt daughter Seda back to advise [place] was confirming Pt insurance.  Seda advised she wants Pt to go to [facility] SNF if \[facility]\"" cannot accept.  CCP following.  SELENA SKINNER/CCP        Discharge Codes    No documentation.             Martha Darnell RN

## 2020-04-08 NOTE — PROGRESS NOTES
"  Infectious Diseases Progress Note    Salome Medina MD     [facility]  Los: 2 days  Patient Identification:  Name: Manuela Gonzalez  Age: 89 y.o.  Sex: female  :  1923  MRN: [id number]         Primary Care Physician: Val Crawford MD            Subjective: Still in significant pain denies any fever and chills.  Interval History: see consultation note    Objective:    Scheduled Meds:    gabapentin 100 mg Oral Q8H   levothyroxine 125 mcg Oral QAM AC   metoprolol succinate XL 12.5 mg Oral Daily   pantoprazole 40 mg Oral Q AM     Continuous Infusions:     Vital signs in last 24 hours:  Temp:  [97 °F (36.1 °C)-98.9 °F (37.2 °C)] 97 °F (36.1 °C)  Heart Rate:  [71-85] 72  Resp:  [16-18] 18  BP: ()/(49-60) 113/60    Intake/Output:    Intake/Output Summary (Last 24 hours) at 2020 0942  Last data filed at 2020 0834  Gross per 24 hour   Intake 600 ml   Output --   Net 600 ml       Exam:  /60 (BP Location: Right arm, Patient Position: Lying)   Pulse 72   Temp 97 °F (36.1 °C) (Oral)   Resp 18   Ht 160 cm (63\")   Wt 75.4 kg (166 lb 3.2 oz)   SpO2 97%   Breastfeeding No   BMI 29.44 kg/m²   Patient is examined using the personal protective equipment as per guidelines from infection control for this particular patient as enacted.  Hand washing was performed before and after patient interaction.  General Appearance:  Comfortable sleepy but arousable in no acute distress                          Head:    Normocephalic, without obvious abnormality, atraumatic                           Eyes:    PERRL, conjunctivae/corneas pale, EOM's intact, both eyes                         Throat:   Lips, tongue, gums normal; oral mucosa pink and moist                           Neck:   Supple, symmetrical, trachea midline, no JVD                         Lungs:    Clear to auscultation bilaterally, respirations unlabored                 Chest Wall:    No tenderness or deformity                      "     Heart:    Regular rate and rhythm, S1 and S2 normal                  Abdomen:     Soft, non-tender, bowel sounds active                 Extremities: Right thigh and groin area larger than the left and tender                        Pulses:   Pulses palpable in all extremities                            Skin:   Skin is warm and dry,  no rashes or palpable lesions                  Neurologic: Grossly nonfocal       Data Review:    I reviewed the patient's new clinical results.  Results from last 7 days   Lab Units 04/08/20  0424 04/07/20  0440 04/06/20  0739 04/05/20  0326 04/02/20  1153   WBC 10*3/mm3 10.67 10.53 9.57 13.95* 7.66   HEMOGLOBIN g/dL 8.8* 8.2* 7.7* 10.5* 11.2*   PLATELETS 10*3/mm3 351 331 347 544* 523*     Results from last 7 days   Lab Units 04/08/20  0424 04/07/20 0440 04/06/20  0739 04/05/20 0326 04/02/20  1153   SODIUM mmol/L 134* 134* 140 138 142   POTASSIUM mmol/L 4.2 4.2 4.2 3.9 4.6   CHLORIDE mmol/L 98 100 102 99 103   CO2 mmol/L 28.1 25.7 27.5 25.4 28.8   BUN mg/dL 16 16 13 10 12   CREATININE mg/dL 0.93 0.90 0.86 0.82 0.84   CALCIUM mg/dL 8.1* 7.9* 8.2 9.0 8.6   GLUCOSE mg/dL 98 90 82 119* 91     Ct Pelvis Without Contrast    Result Date: 4/2/2020  Degenerative change at the hips and the spine. No acute abnormality is identified. I discussed the case with Dr. Vu of the emergency department at 3 PM.  This report was finalized on 4/2/2020 3:45 PM by Dr. Js Stark M.D.      Nm Bone Scan Limited    Result Date: 4/6/2020  Negative bone scan. I discussed the case with Dr. Vinod Vallecillo  This report was finalized on 4/6/2020 3:56 PM by Dr. Js Stark M.D.      Xr Chest 1 View    Result Date: 4/2/2020  Tiny right pleural effusion. Otherwise negative chest x-ray.  This report was finalized on 4/2/2020 12:46 PM by Dr. Js Stark M.D.      Ct Lower Extremity Right Without Contrast    Result Date: 4/8/2020  Right iliopsoas insertional tear with adjacent hematoma measuring approximately  11 x 6 x 4 cm that is similar in size to MRI 3 days ago. Enlargement of the right iliopsoas and iliacus musculature associated with intramuscular hemorrhage. Presacral edema. These findings are new when compared to CT 6 days ago. No fracture.  Radiation dose reduction techniques were utilized, including automated exposure control and exposure modulation based on body size.  This report was finalized on 4/8/2020 9:16 AM by Dr. Martin Soto M.D.      Xr Hip With Or Without Pelvis 2 - 3 View Right    Result Date: 4/5/2020  1. No acute osseous abnormality.  This report was finalized on 4/5/2020 5:40 AM by Kevin Yarbrough M.D.      Microbiology Results (last 10 days)     Procedure Component Value - Date/Time    Blood Culture - Blood, Arm, Left [[id number]] Collected:  04/05/20 1208    Lab Status:  Preliminary result Specimen:  Blood from Arm, Left Updated:  04/07/20 1230     Blood Culture No growth at 2 days    Blood Culture - Blood, Arm, Right [[id number]] Collected:  04/05/20 1208    Lab Status:  Preliminary result Specimen:  Blood from Arm, Right Updated:  04/07/20 1230     Blood Culture No growth at 2 days            Assessment:    Right groin pain  1-right hip pain/groin pain due to muscle injury and hematoma likely bland process and highly unlikely it to be infectious in nature given the lack of systemic signs and symptoms of sepsis and essentially bland inflammatory markers- possibility of occult fracture is high as per ortho.  2-immobility due to recent right hip and groin pain  3-status post right ankle fracture and surgery  4-hypertension  5-macular degeneration  6-mild leukocytosis likely due to demargination instead of active infection.  7-mild eosinophilia significance unclear.  8-progressive anemia due to internal bleed - hgb dropped to 7.7.        Recommendations/Discussions:   · I would not recommend any further infectious disease work-up and continue with management of pain and discomfort in the right  groin area as per internal medicine and orthopedic surgery service.  Keep an eye on her hemoglobin hematocrit.    · Follow up on bone scan  · Follow-up on blood culture results and further management as her condition evolves.      Salome Medina MD  4/8/2020  09:42    Much of this encounter note is an electronic transcription/translation of spoken language to printed text. The electronic translation of spoken language may permit erroneous, or at times, nonsensical words or phrases to be inadvertently transcribed; Although I have reviewed the note for such errors, some may still exist

## 2020-04-08 NOTE — PLAN OF CARE
Problem: Patient Care Overview  Goal: Plan of Care Review  Outcome: Ongoing (interventions implemented as appropriate)  Flowsheets (Taken 4/8/2020 9270)  Progress: improving  Plan of Care Reviewed With: patient  Outcome Summary: pt c/o pain with movements while laying in bed, but tolerated well getting up to BSC and chair this afternoon, feeding herself while sitting up, vss and afebrile, right leg swollen but pulses strong, had CT scan this morning - orthopedic stated it is ok to begin mobilizing patient

## 2020-04-09 NOTE — PROGRESS NOTES
"  Infectious Diseases Progress Note    Salome Medina MD     [facility]  Los: 3 days  Patient Identification:  Name: Manuela Gonzalez  Age: 89 y.o.  Sex: female  :  1923  MRN: [id number]         Primary Care Physician: Val Crawford MD            Subjective: Still having pain and discomfort in the right hip.  Interval History: see consultation note    Objective:    Scheduled Meds:    docusate sodium 100 mg Oral BID   gabapentin 100 mg Oral Q8H   levothyroxine 125 mcg Oral QAM AC   metoprolol succinate XL 12.5 mg Oral Daily   pantoprazole 40 mg Oral Q AM     Continuous Infusions:     Vital signs in last 24 hours:  Temp:  [97 °F (36.1 °C)-98.8 °F (37.1 °C)] 97 °F (36.1 °C)  Heart Rate:  [71-88] 88  Resp:  [14-18] 14  BP: ()/(50-60) 109/54    Intake/Output:    Intake/Output Summary (Last 24 hours) at 2020 0610  Last data filed at 2020 2150  Gross per 24 hour   Intake 640 ml   Output 150 ml   Net 490 ml       Exam:  /54 (BP Location: Right arm, Patient Position: Lying)   Pulse 88   Temp 97 °F (36.1 °C) (Oral)   Resp 14   Ht 160 cm (63\")   Wt 75.4 kg (166 lb 3.2 oz)   SpO2 91%   Breastfeeding No   BMI 29.44 kg/m²   Patient is examined using the personal protective equipment as per guidelines from infection control for this particular patient as enacted.  Hand washing was performed before and after patient interaction.  General Appearance:  Comfortable sleepy but arousable in no acute distress                          Head:    Normocephalic, without obvious abnormality, atraumatic                           Eyes:    PERRL, conjunctivae/corneas pale, EOM's intact, both eyes                         Throat:   Lips, tongue, gums normal; oral mucosa pink and moist                           Neck:   Supple, symmetrical, trachea midline, no JVD                         Lungs:    Clear to auscultation bilaterally, respirations unlabored                 Chest Wall:    No tenderness " or deformity                          Heart:    Regular rate and rhythm, S1 and S2 normal                  Abdomen:     Soft, non-tender, bowel sounds active                 Extremities: Right thigh and groin area larger than the left and tender                        Pulses:   Pulses palpable in all extremities                            Skin:   Skin is warm and dry,  no rashes or palpable lesions                  Neurologic: Grossly nonfocal       Data Review:    I reviewed the patient's new clinical results.  Results from last 7 days   Lab Units 04/09/20  0428 04/08/20  0424 04/07/20  0440 04/06/20  0739 04/05/20  0326 04/02/20  1153   WBC 10*3/mm3 10.54 10.67 10.53 9.57 13.95* 7.66   HEMOGLOBIN g/dL 8.9* 8.8* 8.2* 7.7* 10.5* 11.2*   PLATELETS 10*3/mm3 372 351 331 347 544* 523*     Results from last 7 days   Lab Units 04/09/20  0428 04/08/20  0424 04/07/20  0440 04/06/20  0739 04/05/20  0326 04/02/20  1153   SODIUM mmol/L 140 134* 134* 140 138 142   POTASSIUM mmol/L 4.1 4.2 4.2 4.2 3.9 4.6   CHLORIDE mmol/L 104 98 100 102 99 103   CO2 mmol/L 28.0 28.1 25.7 27.5 25.4 28.8   BUN mg/dL 16 16 16 13 10 12   CREATININE mg/dL 0.82 0.93 0.90 0.86 0.82 0.84   CALCIUM mg/dL 8.2 8.1* 7.9* 8.2 9.0 8.6   GLUCOSE mg/dL 101* 98 90 82 119* 91     Ct Pelvis Without Contrast    Result Date: 4/2/2020  Degenerative change at the hips and the spine. No acute abnormality is identified. I discussed the case with Dr. Vu of the emergency department at 3 PM.  This report was finalized on 4/2/2020 3:45 PM by Dr. Js Stark M.D.      Nm Bone Scan Limited    Result Date: 4/6/2020  Negative bone scan. I discussed the case with Dr. Vinod Vallecillo  This report was finalized on 4/6/2020 3:56 PM by Dr. Js Stark M.D.      Xr Chest 1 View    Result Date: 4/2/2020  Tiny right pleural effusion. Otherwise negative chest x-ray.  This report was finalized on 4/2/2020 12:46 PM by Dr. Js Stark M.D.      Ct Lower Extremity Right Without  Contrast    Result Date: 4/8/2020  Right iliopsoas insertional tear with adjacent hematoma measuring approximately 11 x 6 x 4 cm that is similar in size to MRI 3 days ago. Enlargement of the right iliopsoas and iliacus musculature associated with intramuscular hemorrhage. Presacral edema. These findings are new when compared to CT 6 days ago. No fracture.  Radiation dose reduction techniques were utilized, including automated exposure control and exposure modulation based on body size.  This report was finalized on 4/8/2020 9:16 AM by Dr. Martin Soto M.D.      Xr Hip With Or Without Pelvis 2 - 3 View Right    Result Date: 4/5/2020  1. No acute osseous abnormality.  This report was finalized on 4/5/2020 5:40 AM by Kevin Yarbrough M.D.      Microbiology Results (last 10 days)     Procedure Component Value - Date/Time    Blood Culture - Blood, Arm, Left [[id number]] Collected:  04/05/20 1208    Lab Status:  Preliminary result Specimen:  Blood from Arm, Left Updated:  04/08/20 1231     Blood Culture No growth at 3 days    Blood Culture - Blood, Arm, Right [[id number]] Collected:  04/05/20 1208    Lab Status:  Preliminary result Specimen:  Blood from Arm, Right Updated:  04/08/20 1231     Blood Culture No growth at 3 days            Assessment:    Right groin pain  1-right hip pain/groin pain due to muscle injury and hematoma likely bland process and highly unlikely it to be infectious in nature given the lack of systemic signs and symptoms of sepsis and essentially bland inflammatory markers- possibility of occult fracture is high as per ortho.  2-immobility due to recent right hip and groin pain  3-status post right ankle fracture and surgery  4-hypertension  5-macular degeneration  6-mild leukocytosis likely due to demargination instead of active infection.  7-mild eosinophilia significance unclear.  8-progressive anemia due to internal bleed - hgb dropped to 7.7.        Recommendations/Discussions:   · I would not  recommend any further infectious disease work-up and continue with management of pain and discomfort in the right groin area as per internal medicine and orthopedic surgery service.  Keep an eye on her hemoglobin hematocrit.    · Follow up on bone scan  · Follow-up on blood culture results and further management as her condition evolves.      Salome Medina MD  4/9/2020  06:10    Much of this encounter note is an electronic transcription/translation of spoken language to printed text. The electronic translation of spoken language may permit erroneous, or at times, nonsensical words or phrases to be inadvertently transcribed; Although I have reviewed the note for such errors, some may still exist

## 2020-04-09 NOTE — DISCHARGE SUMMARY
Discharge summary    Date of admission 4/5/2020  Date of discharge 4/9/2020    Final diagnosis  Right hip hematoma and muscle injury edema and inflammation  Hypertension  Osteoarthritis  Macular degeneration  Dementia  Chronic anemia   Acute blood loss anemia  Gastroesophageal reflux disease    Discharge medications    Current Facility-Administered Medications:   •  docusate sodium (COLACE) capsule 100 mg, 100 mg, Oral, BID, Jimbo Guzman MD, 100 mg at 04/09/20 0851  •  gabapentin (NEURONTIN) capsule 100 mg, 100 mg, Oral, Q8H, Jimbo Guzman MD, 100 mg at 04/09/20 0534  •  HYDROcodone-acetaminophen (NORCO) 5-325 MG per tablet 1 tablet, 1 tablet, Oral, Q4H PRN, 1 tablet at 04/09/20 1241 **OR** [DISCONTINUED] HYDROcodone-acetaminophen (NORCO)  MG per tablet 1 tablet, 1 tablet, Oral, Q4H PRN, Jimbo Guzman MD, 1 tablet at 04/09/20 0534  •  levothyroxine (SYNTHROID, LEVOTHROID) tablet 125 mcg, 125 mcg, Oral, QAM AC, Jimbo Guzman MD, 125 mcg at 04/09/20 0533  •  metoprolol succinate XL (TOPROL-XL) 24 hr tablet 12.5 mg, 12.5 mg, Oral, Daily, Jimbo Guzman MD  •  pantoprazole (PROTONIX) EC tablet 40 mg, 40 mg, Oral, Q AM, Jimbo Guzman MD, 40 mg at 04/09/20 0533     Consults obtained  Orthopedic surgery  Infectious disease    Procedures  None    Hospital course  89-year white female with history of hypertension macular degeneration and severe osteoarthritis admitted through emergency room with right hip pain right groin pain.  Patient denies any fall trauma injury and work-up in ER revealed edema inflammation around the right hip admit for management.  Patient admitted she is further evaluated with MRI of the right hip which confirmed hematoma with muscle injury and surrounding edema and inflammation.  Patient hemoglobin remained stable after transfusion but she did drop while on IV fluid.  Patient hemoglobin getting better her pain well controlled.  Patient also evaluated by infectious disease and there is no  evidence of any infection.  Patient be discharged back to nursing home on oral pain medications with weightbearing as tolerated.  Patient remained DNR throughout hospital course    Discharge diet regular    Activity per PT OT    Medication as above    Further care per accepting physician at nursing home    LIZ DUNLAP MD

## 2020-04-09 NOTE — PLAN OF CARE
Problem: Patient Care Overview  Goal: Plan of Care Review  Outcome: Ongoing (interventions implemented as appropriate)  Flowsheets (Taken 4/9/2020 2759)  Progress: improving  Plan of Care Reviewed With: patient  Outcome Summary: Pt tolerated treatment with c/o right groin pain. Pt is CGA for STS transfers today. Pt ambulated 10ft with rwx, [employer]. Pt's ambulation distance is limited due to pain. Pt performed bilateral LE exercises with minimal c/o difficulty on the right LE. Will continue to progress pt as able.

## 2020-04-09 NOTE — PLAN OF CARE
Problem: Patient Care Overview  Goal: Plan of Care Review  Outcome: Ongoing (interventions implemented as appropriate)   Incont urine, still no bm-prunes ordered for bkfst and colace to start this am, forgetful, bed alarm on, O2 on at hs-off in am, scd, med for pain

## 2020-04-09 NOTE — PROGRESS NOTES
"Daily progress note    Chief complaint  Doing better  Fully awake and alert  Eating lunch  No new complaints  Still with pain but much better    History of present illness  89-year-old white female with history of hypertension macular degeneration and severe osteoarthritis presented to [facility] emergency room with right groin and right hip pain.  Patient has been evaluated in ER which showed no evidence of any fracture but she is in a lot of pain admit for further management.  Patient is poor historian but she is in a lot of pain crying admit for management.  No recent fall trauma injury per nursing staff.  Also no fever chills night sweats weight loss or weight gain.     REVIEW OF SYSTEMS  Unremarkable except for pain     PHYSICAL EXAM  Blood pressure 95/57, pulse 78, temperature 97.6 °F (36.4 °C), temperature source Oral, resp. rate 14, height 160 cm (63\"), weight 75.4 kg (166 lb 3.2 oz), SpO2 94 %, not currently breastfeeding.    Constitutional: She is well-developed, well-nourished, and in no distress.   Head: Normocephalic.   Mouth/Throat: Mucous membranes are normal.   Eyes: No scleral icterus.   Neck: Normal range of motion.   Cardiovascular: Normal rate, regular rhythm and normal heart sounds.   Pulses:Dorsalis pedis pulses are 2+ on the right side, and 2+ on the left side. Posterior tibial pulses are 2+ on the right side, and 2+ on the left side.   Pulmonary/Chest: Effort normal and breath sounds normal.   Musculoskeletal: Normal range of motion. No erythema or signs of infection noted to right groin   Neurological: She is alert.   Skin: Skin is warm and dry.   Psychiatric: Mood and affect normal.     LAB RESULTS  Lab Results (last 24 hours)     Procedure Component Value Units Date/Time    Blood Culture - Blood, Arm, Left [[id number]] Collected:  04/05/20 1208    Specimen:  Blood from Arm, Left Updated:  04/09/20 1230     Blood Culture No growth at 4 days    Blood Culture - Blood, Arm, Right " [[id number]] Collected:  04/05/20 1208    Specimen:  Blood from Arm, Right Updated:  04/09/20 1230     Blood Culture No growth at 4 days    Basic Metabolic Panel [[id number]]  (Abnormal) Collected:  04/09/20 0428    Specimen:  Blood Updated:  04/09/20 0521     Glucose 101 mg/dL      BUN 16 mg/dL      Creatinine 0.82 mg/dL      Sodium 140 mmol/L      Potassium 4.1 mmol/L      Chloride 104 mmol/L      CO2 28.0 mmol/L      Calcium 8.2 mg/dL      eGFR Non African Amer 65 mL/min/1.73      BUN/Creatinine Ratio 19.5     Anion Gap 8.0 mmol/L     Narrative:       GFR Normal >60  Chronic Kidney Disease <60  Kidney Failure <15      CBC & Differential [[id number]] Collected:  04/09/20 0428    Specimen:  Blood Updated:  04/09/20 0455    Narrative:       The following orders were created for panel order CBC & Differential.  Procedure                               Abnormality         Status                     ---------                               -----------         ------                     CBC Auto Differential[[id number]]        Abnormal            Final result                 Please view results for these tests on the individual orders.    CBC Auto Differential [[id number]]  (Abnormal) Collected:  04/09/20 0428    Specimen:  Blood Updated:  04/09/20 0455     WBC 10.54 10*3/mm3      RBC 3.07 10*6/mm3      Hemoglobin 8.9 g/dL      Hematocrit 26.3 %      MCV 85.7 fL      MCH 29.0 pg      MCHC 33.8 g/dL      RDW 14.2 %      RDW-SD 45.0 fl      MPV 9.1 fL      Platelets 372 10*3/mm3      Neutrophil % 63.4 %      Lymphocyte % 18.0 %      Monocyte % 7.8 %      Eosinophil % 9.7 %      Basophil % 0.7 %      Immature Grans % 0.4 %      Neutrophils, Absolute 6.69 10*3/mm3      Lymphocytes, Absolute 1.90 10*3/mm3      Monocytes, Absolute 0.82 10*3/mm3      Eosinophils, Absolute 1.02 10*3/mm3      Basophils, Absolute 0.07 10*3/mm3      Immature Grans, Absolute 0.04 10*3/mm3      nRBC 0.0 /100 WBC         Imaging Results (Last 24 Hours)      ** No results found for the last 24 hours. **        Current Facility-Administered Medications:   •  docusate sodium (COLACE) capsule 100 mg, 100 mg, Oral, BID, Liz Guzman MD, 100 mg at 04/09/20 0851  •  gabapentin (NEURONTIN) capsule 100 mg, 100 mg, Oral, Q8H, Liz Guzman MD, 100 mg at 04/09/20 0534  •  HYDROcodone-acetaminophen (NORCO) 5-325 MG per tablet 1 tablet, 1 tablet, Oral, Q4H PRN, 1 tablet at 04/09/20 1241 **OR** HYDROcodone-acetaminophen (NORCO)  MG per tablet 1 tablet, 1 tablet, Oral, Q4H PRN, Liz Guzman MD, 1 tablet at 04/09/20 0534  •  levothyroxine (SYNTHROID, LEVOTHROID) tablet 125 mcg, 125 mcg, Oral, QAM AC, Liz Guzman MD, 125 mcg at 04/09/20 0533  •  metoprolol succinate XL (TOPROL-XL) 24 hr tablet 12.5 mg, 12.5 mg, Oral, Daily, Liz Guzman MD  •  ondansetron (ZOFRAN) injection 4 mg, 4 mg, Intravenous, Q6H PRN, Liz Guzman MD  •  pantoprazole (PROTONIX) EC tablet 40 mg, 40 mg, Oral, Q AM, Liz Guzman MD, 40 mg at 04/09/20 0533     ASSESSMENT  Right hip pain with edema and inflammation hematoma and muscle injury  Hypertension  Osteoarthritis  Macular degeneration  Dementia  Chronic anemia   Acute blood loss anemia  Gastroesophageal reflux disease    PLAN  Discharge to nursing home  Discharge summary dictated    LIZ GUZMAN MD

## 2020-04-09 NOTE — PROGRESS NOTES
Continued Stay Note  [facility]     Patient Name: Manuela Gonzalez  MRN: [id number]  Today's Date: 4/9/2020    Admit Date: 4/5/2020    Discharge Plan     Row Name 04/09/20 1637       Plan    Plan Comments  Spoke with pt at bedside who is agreeable with d/c plan. Spoke with pts daughter and informed her we got in touch with [employer] Devika who agreed to override the OON benefits due to Covid. Shelley was placed on a 3 way call with Devika and stated they are able to accept today and have bed available.     Final Discharge Disposition Code  03 - skilled nursing facility (SNF)    Final Note  Pt to d/c to [facility] skilled. [facility] to transport at 1800.    Row Name 04/09/20 6793       Plan    Plan  1st choice- [facility] vs. 2nd choice- [facility].    Plan Comments  Spoke to Kitty santiago [phone] with [facility] who stated that they are not in network with [employer] insurances and she wants to ensure that they will get paid for the patient if they accept the patient under Covid-19 preparedness.  Telephone call was made to Devika with [employer] [phone] to assist in determining if placement for the patient in a facility that does not have a contract with [employer] is possible.  [facility] will follow up with Michelle with [facility], the patient and her daughter and will assist with patient's d/c to a skilled bed at either 1st choice- [facility] or 2nd choice [facility] per Covid-19 preparedness by [facility] transport.  RIA Hernandez    Row Name 04/09/20 1400       Plan    Plan  1st choice- [facility] vs. 2nd choice- [facility].    Plan Comments  Spoke to the patient's daughter Seda Payne [phone] who stated that she really wants the patient to go to [facility] for rehab.  Spoke to Kitty [phone] who is the Director of  but is covering for  admissions.  Kitty stated that she was unable to verify that the patient had coverage through [employer] and she was provided with the contact numbers on the back of the patient's insurance card.  Kitty stated that she and Nathaly Chiang who is the Director of Activities will be working on verifying the patient's insurance.  Informed the patient's daughter that she would be contacted once the insurance was verified and a decision was made by [facility].  The patient's daughter confirmed that her 2nd choice is [facility].  The patient's daughter stated that the patient will need an ambulance transport and she was informed that insurance coverage is not guaranteed for an ambulance transport.  Spoke to [facility] with [facility] and arranged transportation for the patient for 6pm.  [facility] will follow up with Kitty and Nathaly with [facility], the patient and her daughter and will assist with patient's d/c to a skilled bed at either 1st choice- [facility] or 2nd choice [facility] per Covid-19 preparedness by [facility] transport.  RIA Hernandez        Discharge Codes    No documentation.       Expected Discharge Date and Time     Expected Discharge Date Expected Discharge Time    Apr 9, 2020             Claudia Navarro RN

## 2020-04-09 NOTE — THERAPY TREATMENT NOTE
Patient Name: Manuela Gonzalez  : 1923    MRN: [id number]                              Today's Date: 2020       Admit Date: 2020    Visit Dx:     ICD-10-CM ICD-9-CM   1. Right groin pain R10.31 789.03   2. Leukocytosis, unspecified type D72.829 288.60   3. Elevated C-reactive protein (CRP) R79.82 790.95     Patient Active Problem List   Diagnosis   • Traumatic rhabdomyolysis (CMS/HCC)   • Closed bimalleolar fracture of right ankle   • Right groin pain     Past Medical History:   Diagnosis Date   • Hypertension    • Macular degeneration      Past Surgical History:   Procedure Laterality Date   • ANKLE OPEN REDUCTION INTERNAL FIXATION Right 2020    Procedure: BIMALLEOLAR FRACTURE OPEN REDUCTION INTERNAL FIXATION;  Surgeon: Js Montalvo Jr., MD;  Location: [facility];  Service: Orthopedics   • APPENDECTOMY     • DIAGNOSTIC LAPAROSCOPY      for ovarian cyst, when patient was 15 years old     General Information     Row Name 20          PT Evaluation Time/Intention    Document Type  therapy note (daily note)  -     Mode of Treatment  physical therapy;individual therapy  -     Row Name 20          General Information    Existing Precautions/Restrictions  fall  -     Row Name 20          Cognitive Assessment/Intervention- PT/OT    Orientation Status (Cognition)  oriented x 3  -     Row Name 20          Safety Issues, Functional Mobility    Impairments Affecting Function (Mobility)  pain;endurance/activity tolerance  -       User Key  (r) = Recorded By, (t) = Taken By, (c) = Cosigned By    Initials Name Provider Type     Cherry Viera PTA Physical Therapy Assistant        Mobility     Row Name 20          Bed Mobility Assessment/Treatment    Comment (Bed Mobility)  NT-C  -     Row Name 20          Sit-Stand Transfer    Sit-Stand [place] (Transfers)  contact guard  -     Assistive Device (Sit-Stand Transfers)   walker, front-wheeled  -     Row Name 04/09/20 0955          Gait/Stairs Assessment/Training    Riley Level (Gait)  minimum assist (75% patient effort)  -     Assistive Device (Gait)  walker, front-wheeled  -     Distance in Feet (Gait)  10  -EH     Deviations/Abnormal Patterns (Gait)  antalgic;jeni decreased;gait speed decreased;stride length decreased  -       User Key  (r) = Recorded By, (t) = Taken By, (c) = Cosigned By    Initials Name Provider Type     Cherry Viera PTA Physical Therapy Assistant        Obj/Interventions     [facility] Name 04/09/20 0955          Therapeutic Exercise    Lower Extremity (Therapeutic Exercise)  LAQ (long arc quad), bilateral;marching while seated  -     Lower Extremity Range of Motion (Therapeutic Exercise)  hip abduction/adduction, bilateral;ankle dorsiflexion/plantar flexion, bilateral  -     Exercise Type (Therapeutic Exercise)  AROM (active range of motion)  -     Position (Therapeutic Exercise)  seated  -     Sets/Reps (Therapeutic Exercise)  X10  -       User Key  (r) = Recorded By, (t) = Taken By, (c) = Cosigned By    Initials Name Provider Type     Cherry Viera PTA Physical Therapy Assistant        Goals/Plan    No documentation.       Clinical Impression     [facility] Name 04/09/20 0956          Pain Scale: Numbers Pre/Post-Treatment    Pain Location - Side  Right  -     Pain Location  groin  -[facility] Name 04/09/20 0956          Plan of Care Review    Plan of Care Reviewed With  patient  -     Progress  improving  -     Outcome Summary  Pt tolerated treatment with c/o right groin pain. Pt is CGA for STS transfers today. Pt ambulated 10ft with rwx, [employer]. Pt's ambulation distance is limited due to pain. Pt performed bilateral LE exercises with minimal c/o difficulty on the right LE. Will continue to progress pt as able.   -     Row Name 04/09/20 0956          Positioning and Restraints    Pre-Treatment Position  sitting in chair/recliner   -     Post Treatment Position  chair  -EH     In Chair  reclined;call light within reach;exit alarm on;encouraged to call for assist  -       User Key  (r) = Recorded By, (t) = Taken By, (c) = Cosigned By    Initials Name Provider Type    Cherry Rodríguez PTA Physical Therapy Assistant        Outcome Measures     Row Name 04/09/20 0958          How much help from another person do you currently need...    Turning from your back to your side while in flat bed without using bedrails?  2  -EH     Moving from lying on back to sitting on the side of a flat bed without bedrails?  2  -EH     Moving to and from a bed to a chair (including a wheelchair)?  3  -EH     Standing up from a chair using your arms (e.g., wheelchair, bedside chair)?  3  -EH     Climbing 3-5 steps with a railing?  1  -EH     To walk in hospital room?  3  -EH     AM-PAC 6 Clicks Score (PT)  14  -       User Key  (r) = Recorded By, (t) = Taken By, (c) = Cosigned By    Initials Name Provider Type    Cherry Rodríguez PTA Physical Therapy Assistant          PT Recommendation and Plan     Plan of Care Reviewed With: patient  Progress: improving  Outcome Summary: Pt tolerated treatment with c/o right groin pain. Pt is CGA for STS transfers today. Pt ambulated 10ft with rwx [employer]. Pt's ambulation distance is limited due to pain. Pt performed bilateral LE exercises with minimal c/o difficulty on the right LE. Will continue to progress pt as able.      Time Calculation:   PT Charges     Row Name 04/09/20 0954             Time Calculation    Start Time  0921  -      Stop Time  0936  -      Time Calculation (min)  15 min  -      PT Received On  04/09/20  -      PT - Next Appointment  04/11/20  -         Time Calculation- PT    Total Timed Code Minutes- PT  15 minute(s)  -        User Key  (r) = Recorded By, (t) = Taken By, (c) = Cosigned By    Initials Name Provider Type    Cherry Rodríguez PTA Physical Therapy Assistant        Therapy  Charges for Today     Code Description Service Date Service Provider Modifiers Qty    [id number] HC PT THER PROC EA 15 MIN 4/9/2020 Cherry Viera, PTA GP 1          PT G-Codes  Outcome Measure Options: AM-PAC 6 Clicks Basic Mobility (PT)  AM-PAC 6 Clicks Score (PT): 14  AM-PAC 6 Clicks Score (OT): 15    Cherry Viera PTA  4/9/2020

## 2020-04-09 NOTE — PROGRESS NOTES
Continued Stay Note  [facility]     Patient Name: Manuela Gonzalez  MRN: [id number]  Today's Date: 4/9/2020    Admit Date: 4/5/2020    Discharge Plan     Row Name 04/09/20 1210       Plan    Plan  [facility].    Plan Comments  Spoke to Cris Brock with [facility] [phone] who confirmed that they have a bed available for the patient today.  Cris provided report # [phone] and fax # [phone].  Voicemail message was left for the patient's daughter Seda Payne [phone] to confirm the patient's d/c to [facility] and CCP will arrange transportation.  CCP will follow to assist with patient's transfer to [facility] per Covid-19 preparedness as Cris states that pre-cert is waived.  RIA Hernandez         Discharge Codes    No documentation.             RIA Good

## 2020-04-09 NOTE — PROGRESS NOTES
"  Orthopaedic Surgery   Daily Progress Note  Dr. Mike Vallecillo Sr  [phone]  DEMOGRAPHICS:   · Manuela Gonzalez   · Age:89 y.o.   · MRN:[id number]  · Admitted: 4/5/2020    PROCEDURE:   s/p * No surgery found *     /51 (BP Location: Right arm, Patient Position: Lying)   Pulse 75   Temp 99 °F (37.2 °C) (Oral)   Resp 14   Ht 160 cm (63\")   Wt 75.4 kg (166 lb 3.2 oz)   SpO2 91%   Breastfeeding No   BMI 29.44 kg/m²     Lab Results (last 24 hours)     Procedure Component Value Units Date/Time    Basic Metabolic Panel [[id number]]  (Abnormal) Collected:  04/09/20 0428    Specimen:  Blood Updated:  04/09/20 0521     Glucose 101 mg/dL      BUN 16 mg/dL      Creatinine 0.82 mg/dL      Sodium 140 mmol/L      Potassium 4.1 mmol/L      Chloride 104 mmol/L      CO2 28.0 mmol/L      Calcium 8.2 mg/dL      eGFR Non African Amer 65 mL/min/1.73      BUN/Creatinine Ratio 19.5     Anion Gap 8.0 mmol/L     Narrative:       GFR Normal >60  Chronic Kidney Disease <60  Kidney Failure <15      CBC & Differential [[id number]] Collected:  04/09/20 0428    Specimen:  Blood Updated:  04/09/20 0455    Narrative:       The following orders were created for panel order CBC & Differential.  Procedure                               Abnormality         Status                     ---------                               -----------         ------                     CBC Auto Differential[[id number]]        Abnormal            Final result                 Please view results for these tests on the individual orders.    CBC Auto Differential [[id number]]  (Abnormal) Collected:  04/09/20 0428    Specimen:  Blood Updated:  04/09/20 0455     WBC 10.54 10*3/mm3      RBC 3.07 10*6/mm3      Hemoglobin 8.9 g/dL      Hematocrit 26.3 %      MCV 85.7 fL      MCH 29.0 pg      MCHC 33.8 g/dL      RDW 14.2 %      RDW-SD 45.0 fl      MPV 9.1 fL      Platelets 372 10*3/mm3      Neutrophil % 63.4 %      Lymphocyte % 18.0 %      Monocyte % 7.8 %      " Eosinophil % 9.7 %      Basophil % 0.7 %      Immature Grans % 0.4 %      Neutrophils, Absolute 6.69 10*3/mm3      Lymphocytes, Absolute 1.90 10*3/mm3      Monocytes, Absolute 0.82 10*3/mm3      Eosinophils, Absolute 1.02 10*3/mm3      Basophils, Absolute 0.07 10*3/mm3      Immature Grans, Absolute 0.04 10*3/mm3      nRBC 0.0 /100 WBC     Blood Culture - Blood, Arm, Left [[id number]] Collected:  04/05/20 1208    Specimen:  Blood from Arm, Left Updated:  04/08/20 1231     Blood Culture No growth at 3 days    Blood Culture - Blood, Arm, Right [[id number]] Collected:  04/05/20 1208    Specimen:  Blood from Arm, Right Updated:  04/08/20 1231     Blood Culture No growth at 3 days          Imaging Results (Last 24 Hours)     Procedure Component Value Units Date/Time    CT Lower Extremity Right Without Contrast [[id number]] Collected:  04/08/20 0911     Updated:  04/08/20 0919    Narrative:       CT RIGHT HIP WITHOUT CONTRAST     HISTORY: 89-year-old female with history of recent fall and right hip  pain.     TECHNIQUE: CT includes axial imaging through the pelvis/right hip and  this data was reconstructed in coronal and sagittal planes. No contrast  administered.     COMPARISON: Limited bone scan right hip 04/06/2020, MRI right hip  04/05/2020, CT pelvis 04/02/2020.     FINDINGS: There is enlargement of the right iliacus muscle, right  iliopsoas muscle that is new when compared to CT 6 days ago and similar  to MRI 3 days ago. There is hyperdense material extending within and  anterior to the right iliopsoas muscle representing a hematoma and this  measures approximately 5.5 x 4.4 cm axial dimension at the horizontal  level of the lesser trochanter and extends 11 cm in length and this is  similar to the MRI 3 days ago. There is an iliopsoas insertional tear.  Generalized edema is present within and surrounding the musculature  about the right hip and proximal thigh. No fracture is evident. There  are chronic degenerative  changes at the hips and lower lumbar spine.       Impression:       Right iliopsoas insertional tear with adjacent hematoma  measuring approximately 11 x 6 x 4 cm that is similar in size to MRI 3  days ago. Enlargement of the right iliopsoas and iliacus musculature  associated with intramuscular hemorrhage. Presacral edema. These  findings are new when compared to CT 6 days ago. No fracture.     Radiation dose reduction techniques were utilized, including automated  exposure control and exposure modulation based on body size.     This report was finalized on 4/8/2020 9:16 AM by Dr. Martin Soto M.D.             Patient Care Team:  Val Crawford MD as PCP - General (Internal Medicine)    SUBJECTIVE  Difficult to evaluate, but she seems more comfortable    PHYSICAL EXAM  No change in status of right hip pain.  Perhaps a little less irritable.     ASSESSMENT:   Hematoma right anterior hip associated with iliopsoas tear.  CT scan yesterday did not show any an increase in size and hemodynamically she is stable.  Hemoglobin today is 8.9.  It was 8.8 yesterday.    PLAN / DISPOSITION:  She may mobilize as tolerated.  She may weight-bear as tolerated.  She is in stable condition for discharge from an orthopedic standpoint.    Orthopedic discharge instructions:  1.  May mobilize and weight-bear as tolerated.  She really has no restrictions other than what she tolerates with pain.  2.  This will not require surgery.  Hematoma should resorb spontaneously  3.  She needs to follow-up with us only if she is having acute problems.    Mike Vallecillo Sr, MD  Orthopaedic Surgery  [facility]  [phone]

## 2020-04-09 NOTE — PROGRESS NOTES
"Physicians Statement of Medical Necessity for  Ambulance Transportation    GENERAL INFORMATION     Name: Manuela Gonzalez  YOB: 1923  Medicare #: [id number]   Transport Date: 4/9/2020 (Valid for round trips this date, or for scheduled repetitive trips for 60 days from the date signed below.)  Origin: [facility]  Destination: [facility]   Is the Patient's stay covered under Medicare Part A (PPS/DRG?)  Closest appropriate facility?   If this a hosp-hosp transfer? No   Is this a hospice patient? no  MEDICAL NECESSITY QUESTIONAIRE    Ambulance Transportation is medically necessary only if other means of transportation are contraindicated or would be potentially harmful to the patient.  To meet this requirement, the patient must be either \"bed confined\" or suffer from a condition such that transport by means other than an ambulance is contraindicated by the patient's condition.  The following questions must be answered by the healthcare professional signing below for this form to be valid:     1) Describe the MEDICAL CONDITION (physical and/or mental) of this patient AT THE TIME OF AMBULANCE TRANSPORT that requires the patient to be transported in an ambulance, and why transport by other means is contraindicated by the patient's condition:   Past Medical History:   Diagnosis Date   • Hypertension    • Macular degeneration       Past Surgical History:   Procedure Laterality Date   • ANKLE OPEN REDUCTION INTERNAL FIXATION Right 1/17/2020    Procedure: BIMALLEOLAR FRACTURE OPEN REDUCTION INTERNAL FIXATION;  Surgeon: Js Montalvo Jr., MD;  Location: [facility];  Service: Orthopedics   • APPENDECTOMY     • DIAGNOSTIC LAPAROSCOPY      for ovarian cyst, when patient was 15 years old      2) Is this patient \"bed confined\" as defined below? Yes    To be \"bed confined\" the patient must satisfy all three of the following criteria:  (1) unable to get up from bed without assistance; AND " (2) unable to ambulate;  AND (3) unable to sit in a chair or wheelchair.  3) Can this patient safely be transported by car or wheelchair van (I.e., may safely sit during transport, without an attendant or monitoring?)  4. In addition to completing questions 1-3 above, please check any of the following conditions that apply*:          *Note: supporting documentation for any boxes checked must be maintained in the patient's medical records Patient is confused and Moderate/severe pain on movement      SIGNATURE OF PHYSICIAN OR OTHER AUTHORIZED HEALTHCARE PROFESSIONAL    I certify that the above information is true and correct based on my evaluation of this patient, and represent that the patient requires transport by ambulance and that other forms of transport are contraindicated.  I understand that this information will be used by the Centers for Medicare and Medicaid Services (CMS) to support the determiniation of medical necessity for ambulance services, and I represent that I have personal knowledge of the patient's condition at the time of transport.    x   If this box is checked, I also certify that the patient is physically or mentally incapable of signing the ambulance service's claim form and that the institution with which I am affiliated has furnished care, services or assistance to the patient.  My signature below is made on behalf of the patient pursuant to 42 .36(b)(4). In accordance with 42 .37, the specific reason(s) that the patient is physically or mentally incapable of signing the claim for is as follows:     Signature of Physician or Healthcare Professional  Date/Time:        (For Scheduled repetitive transport, this form is not valid for transports performed more than 60 days after this date).                                                                                                                                             --------------------------------------------------------------------------------------------  Printed Name and Credentials of Physician or Authorized Healthcare Professional     *Form must be signed by patient's attending physician for scheduled, repetitive transports,.  For non-repetitive ambulance transports, if unable to obtain the signature of the attending physician, any of the following may sign (please select below):     Physician  Clinical Nurse Specialist  Registered Nurse     Physician Assistant  Discharge Planner  Licensed Practical Nurse     Nurse Practitioner

## 2020-04-09 NOTE — PROGRESS NOTES
Continued Stay Note  [facility]     Patient Name: Manuela Gonzalez  MRN: [id number]  Today's Date: 4/9/2020    Admit Date: 4/5/2020    Discharge Plan     Row Name 04/09/20 1548       Plan    Plan  1st choice- [facility] vs. 2nd choice- [facility].    Plan Comments  Spoke to Kitty santiago [phone] with [facility] who stated that they are not in network with [employer] insurances and she wants to ensure that they will get paid for the patient if they accept the patient under Covid-19 preparedness.  Telephone call was made to Devika with [employer] [phone] to assist in determining if placement for the patient in a facility that does not have a contract with [employer] is possible.  [facility] will follow up with Kitty and Nathaly with [facility], the patient and her daughter and will assist with patient's d/c to a skilled bed at either 1st choice- [facility] or 2nd choice [facility] per Covid-19 preparedness by [facility] transport.  RIA Hernandez    Row Name 04/09/20 1400       Plan    Plan  1st choice- [facility] vs. 2nd choice- [facility].    Plan Comments  Spoke to the patient's daughter Seda Payne [phone] who stated that she really wants the patient to go to [facility] for rehab.  Spoke to Kitty [phone] who is the Director of  but is covering for admissions.  Kitty stated that she was unable to verify that the patient had coverage through [employer] and she was provided with the contact numbers on the back of the patient's insurance card.  Kitty stated that she and Nathaly Chiang who is the Director of [facility] will be working on verifying the patient's insurance.  Informed the patient's daughter that she would be contacted once the insurance was verified and a decision was made by [facility].  The patient's daughter confirmed that her 2nd choice is .  [facility].  The patient's daughter stated that the patient will need an ambulance transport and she was informed that insurance coverage is not guaranteed for an ambulance transport.  Spoke to Janel with [facility] and arranged transportation for the patient for 6pm.  [facility] will follow up with Michelle with [facility], the patient and her daughter and will assist with patient's d/c to a skilled bed at either 1st choice- [facility] or 2nd choice [facility] per Covid-19 preparedness by [facility] transport.  RIA Hernandez        Discharge Codes    No documentation.       Expected Discharge Date and Time     Expected Discharge Date Expected Discharge Time    Apr 9, 2020             RIA Good

## 2020-04-09 NOTE — PROGRESS NOTES
Continued Stay Note  [facility]     Patient Name: Manuela Gonzalez  MRN: [id number]  Today's Date: 4/9/2020    Admit Date: 4/5/2020    Discharge Plan     Row Name 04/09/20 1400       Plan    Plan  1st choice- [facility] vs. 2nd choice- [facility].    Plan Comments Spoke to the patient’s daughter Seda Payne [phone] who stated that she really wants the patient to go to [facility] for rehab.  Spoke to Kitty [phone] who is the Director of  but is covering for admissions.  Kitty stated that she was unable to verify that the patient had coverage through [employer] and she was provided with the contact numbers on the back of the patient’s insurance card.  Kitty stated that she and Nathaly Chiang who is the Director of [facility] will be working on verifying the patient’s insurance.  Informed the patient’s daughter that she would be contacted once the insurance was verified and a decision was made by [facility].  The patient’s daughter confirmed that her 2nd choice is [facility].  The patient’s daughter stated that the patient will need an ambulance transport and she was informed that insurance coverage is not guaranteed for an ambulance transport.  Spoke to Janel with [facility] and arranged transportation for the patient for 6pm.  [facility] will follow up with Michelle with [facility], the patient and her daughter and will assist with patient’s d/c to a skilled bed at either 1st choice- [facility] or 2nd choice [facility] per Covid-19 preparedness by [facility] transport.  RIA Hernandez    Row Name 04/09/20 1210       Plan    Plan  [facility].    Plan Comments   Spoke to Cris Brock with [facility] [phone] who confirmed that they have a bed available for the patient today.  Cris provided report # [phone] and fax # [phone].  Voicemail message was left for  the patient's daughter Seda Payne [phone] to confirm the patient's d/c to [facility] and CCP will arrange transportation.  CCP will follow to assist with patient's transfer to [facility] per Covid-19 preparedness as Cris states that pre-cert is waived.  RIA Hernandez        Discharge Codes    No documentation.       Expected Discharge Date and Time     Expected Discharge Date Expected Discharge Time    Apr 9, 2020             RIA Good

## 2020-04-09 NOTE — PLAN OF CARE
VSS, rash mainly on left thigh, pt scratching at it & stomach, c/o pain with movement, up to chair with PT assist, medicated for pain x 2 with good relief, [place], missing teeth, macular degeneration, tray set up for meals, inc of bowel & bladder, d/c to [place] facility for rehab

## 2020-04-23 PROBLEM — I10 HTN (HYPERTENSION): Status: ACTIVE | Noted: 2020-01-01

## 2020-04-23 PROBLEM — E87.1 HYPONATREMIA: Status: ACTIVE | Noted: 2020-01-01

## 2020-04-23 PROBLEM — E03.9 HYPOTHYROIDISM: Status: ACTIVE | Noted: 2020-01-01

## 2020-04-23 PROBLEM — T81.49XA SURGICAL WOUND INFECTION: Status: ACTIVE | Noted: 2020-01-01

## 2020-04-23 PROBLEM — L03.115 CELLULITIS OF RIGHT LOWER EXTREMITY: Status: ACTIVE | Noted: 2020-01-01

## 2020-04-24 PROBLEM — D64.9 ANEMIA: Status: ACTIVE | Noted: 2020-01-01

## 2020-04-24 PROBLEM — E87.1 HYPONATREMIA: Status: RESOLVED | Noted: 2020-01-01 | Resolved: 2020-01-01

## 2020-04-24 PROBLEM — E87.6 HYPOKALEMIA: Status: ACTIVE | Noted: 2020-01-01

## 2020-04-24 PROBLEM — R01.1 CARDIAC MURMUR: Status: ACTIVE | Noted: 2020-01-01

## 2020-04-24 NOTE — ANESTHESIA PROCEDURE NOTES
Peripheral Block    Pre-sedation assessment completed: 4/24/2020 12:10 PM    Patient reassessed immediately prior to procedure    Patient location during procedure: holding area  Start time: 4/24/2020 12:10 PM  Stop time: 4/24/2020 12:20 PM  Reason for block: at surgeon's request and post-op pain management  Performed by  Anesthesiologist: Ron Medeiros MD  Preanesthetic Checklist  Completed: patient identified, site marked, surgical consent, pre-op evaluation, timeout performed, IV checked, risks and benefits discussed and monitors and equipment checked  Prep:  Sterile barriers:cap, gloves, gown, mask and sterile barriers  Prep: ChloraPrep  Patient monitoring: blood pressure monitoring, continuous pulse oximetry and EKG  Procedure  Sedation:yes    Guidance:ultrasound guided  ULTRASOUND INTERPRETATION.  Using ultrasound guidance a 21 G gauge needle was placed in close proximity to the femoral and sciatic nerve, at which point, under ultrasound guidance anesthetic was injected in the area of the nerve and spread of the anesthesia was seen on ultrasound in close proximity thereto.  There were no abnormalities seen on ultrasound; a digital image was taken; and the patient tolerated the procedure with no complications. Images:still images obtained    Laterality:right  Block Type:femoral and popliteal  Injection Technique:single-shot  Needle Type:echogenic  Needle Gauge:21 G      Medications Used: ropivacaine (NAROPIN) 0.5 % injection, 20 mL  ropivacaine (NAROPIN) 0.2 % injection, 10 mL  mepivacaine (CARBOCAINE) 1.5 % injection, 10 mL      Post Assessment  Injection Assessment: negative aspiration for heme, no paresthesia on injection and incremental injection  Patient Tolerance:comfortable throughout block  Complications:no

## 2020-04-24 NOTE — ANESTHESIA POSTPROCEDURE EVALUATION
"Patient: Manuela Gonzalez    Procedure Summary     Date:  04/24/20 Room / Location:  [facility] OR [address] [place] [address] MAIN OR    Anesthesia Start:  1238 Anesthesia Stop:  1359    Procedures:       RT. I&D ANKLE (Right )      RT.ANKLEHARDWARE REMOVAL (Right Ankle) Diagnosis:      Surgeon:  Js Montalvo Jr., MD Provider:  James Sanders MD    Anesthesia Type:  regional ASA Status:  3          Anesthesia Type: regional    Vitals  Vitals Value Taken Time   /67 4/24/2020  2:12 PM   Temp 36.8 °C (98.2 °F) 4/24/2020  1:57 PM   Pulse 71 4/24/2020  2:13 PM   Resp 16 4/24/2020  2:10 PM   SpO2 100 % 4/24/2020  2:13 PM   Vitals shown include unvalidated device data.        Post Anesthesia Care and Evaluation    Patient location during evaluation: bedside  Level of consciousness: awake  Pain management: adequate  Airway patency: patent  Anesthetic complications: No anesthetic complications    Cardiovascular status: acceptable  Respiratory status: acceptable  Hydration status: acceptable    Comments: /67   Pulse 71   Temp 36.8 °C (98.2 °F) (Oral)   Resp 16   Ht 160 cm (63\")   Wt 63.5 kg (140 lb)   SpO2 100%   BMI 24.80 kg/m²         "

## 2020-04-26 PROBLEM — K42.9 PERIUMBILICAL HERNIA: Status: ACTIVE | Noted: 2020-01-01

## 2020-04-26 PROBLEM — K56.609 SBO (SMALL BOWEL OBSTRUCTION) (HCC): Status: ACTIVE | Noted: 2020-01-01

## 2020-04-26 PROBLEM — K42.9 PERIUMBILICAL HERNIA: Status: RESOLVED | Noted: 2020-01-01 | Resolved: 2020-01-01

## 2020-04-26 NOTE — ANESTHESIA PROCEDURE NOTES
Arterial Line    Pre-sedation assessment completed: 4/26/2020 2:35 PM    Patient reassessed immediately prior to procedure    Patient location during procedure: holding area  Start time: 4/26/2020 2:36 PM  Stop Time:4/26/2020 2:43 PM       Line placed for hemodynamic monitoring.  Performed By   Anesthesiologist: Sedrick Johansen MD  Preanesthetic Checklist  Completed: patient identified, site marked, surgical consent, pre-op evaluation, timeout performed, IV checked, risks and benefits discussed and monitors and equipment checked  Arterial Line Prep   Sterile Tech: mask and gloves  Prep: ChloraPrep  Patient monitoring: blood pressure monitoring, continuous pulse oximetry and EKG  Arterial Line Procedure   Laterality:right  Location:  radial artery  Catheter size: 20 G   Guidance: ultrasound guided  PROCEDURE NOTE/ULTRASOUND INTERPRETATION.  Using ultrasound guidance the potential vascular sites for insertion of the catheter were visualized to determine the patency of the vessel to be used for vascular access.  After selecting the appropriate site for insertion, the needle was visualized under ultrasound being inserted into the radial artery, followed by ultrasound confirmation of wire and catheter placement. There were no abnormalities seen on ultrasound; an image was taken; and the patient tolerated the procedure with no complications.   Number of attempts: 1  Successful placement: yes  Post Assessment   Dressing Type: secured with tape and wrist guard applied.   Complications no  Circ/Move/Sens Assessment: normal.   Patient Tolerance: patient tolerated the procedure well with no apparent complications

## 2020-04-26 NOTE — ANESTHESIA PROCEDURE NOTES
Airway  Urgency: elective    Date/Time: 4/26/2020 3:30 PM  Airway not difficult    General Information and Staff    Patient location during procedure: OR  Anesthesiologist: Sedrick Johansen MD  CRNA: Joana Shi CRNA    Indications and Patient Condition  Indications for airway management: airway protection    Preoxygenated: yes  MILS not maintained throughout  Mask difficulty assessment: 1 - vent by mask    Final Airway Details  Final airway type: endotracheal airway      Successful airway: ETT  Cuffed: yes   Successful intubation technique: direct laryngoscopy and RSI  Facilitating devices/methods: cricoid pressure  Endotracheal tube insertion site: oral  Blade: Justo  Blade size: 3  ETT size (mm): 7.0  Cormack-Lehane Classification: grade I - full view of glottis  Placement verified by: chest auscultation and capnometry   Measured from: lips  Number of attempts at approach: 1  Assessment: lips, teeth, and gum same as pre-op and atraumatic intubation    Additional Comments  Dentition intact and unchanged. CBEBS.  +ETCO2.

## 2020-04-26 NOTE — ANESTHESIA PREPROCEDURE EVALUATION
Anesthesia Evaluation     Patient summary reviewed and Nursing notes reviewed   no history of anesthetic complications:  NPO Solid Status: > 8 hours  NPO Liquid Status: > 2 hours           Airway   Mallampati: II  TM distance: >3 FB  Neck ROM: full  No difficulty expected  Dental    (+) poor dentition    Pulmonary     breath sounds clear to auscultation  Cardiovascular   Exercise tolerance: good (4-7 METS)    ECG reviewed  Rhythm: regular  Rate: normal    (+) hypertension, valvular problems/murmurs AS, murmur,     ROS comment: Echo reviewed, severe AS    Neuro/Psych  GI/Hepatic/Renal/Endo    (+)  GERD,  thyroid problem hypothyroidism    ROS Comment: Incarcerated hernia    Musculoskeletal     Abdominal     Abdomen: soft.   Substance History      OB/GYN          Other                        Anesthesia Plan    ASA 4 - emergent     general   (Plan for GETA with RSI. Will place arterial line in pre-op given hx of severe AS)  intravenous induction     Anesthetic plan, all risks, benefits, and alternatives have been provided, discussed and informed consent has been obtained with: patient.    Plan discussed with CRNA.

## 2020-04-26 NOTE — ANESTHESIA POSTPROCEDURE EVALUATION
Patient: Manuela Gonzalez    Procedure Summary     Date:  04/26/20 Room / Location:  [facility] OR [address] [place] [address] MAIN OR    Anesthesia Start:  1514 Anesthesia Stop:  1705    Procedure:  UMBILICAL HERNIA REPAIR (N/A Abdomen) Diagnosis:       SBO (small bowel obstruction) (CMS/HCC)      (SBO (small bowel obstruction) (CMS/HCC) [K56.609])    Surgeon:  Juancarlos Torres MD Provider:  Sedrick Johansen MD    Anesthesia Type:  general ASA Status:  4 - Emergent          Anesthesia Type: general    Vitals  Vitals Value Taken Time   /76 4/26/2020  5:15 PM   Temp 36.7 °C (98 °F) 4/26/2020  5:02 PM   Pulse 78 4/26/2020  5:23 PM   Resp 16 4/26/2020  5:15 PM   SpO2 98 % 4/26/2020  5:23 PM   Vitals shown include unvalidated device data.        Post Anesthesia Care and Evaluation    Patient location during evaluation: bedside  Patient participation: complete - patient participated  Level of consciousness: awake and alert  Pain management: adequate  Airway patency: patent  Anesthetic complications: No anesthetic complications  PONV Status: controlled  Cardiovascular status: blood pressure returned to baseline and acceptable  Respiratory status: acceptable  Hydration status: acceptable

## 2020-04-27 PROBLEM — E87.20 METABOLIC ACIDOSIS: Status: ACTIVE | Noted: 2020-01-01

## 2020-04-27 PROBLEM — A41.02 MRSA (METHICILLIN RESISTANT STAPHYLOCOCCUS AUREUS) SEPTICEMIA (HCC): Status: ACTIVE | Noted: 2020-01-01

## 2020-04-27 PROBLEM — I35.0 SEVERE AORTIC STENOSIS: Status: ACTIVE | Noted: 2020-01-01

## 2020-04-28 PROBLEM — N17.9 AKI (ACUTE KIDNEY INJURY) (HCC): Status: ACTIVE | Noted: 2020-01-01

## 2020-04-29 PROBLEM — J96.01 ACUTE RESPIRATORY FAILURE WITH HYPOXIA (HCC): Status: ACTIVE | Noted: 2020-01-01

## 2020-04-29 PROBLEM — J69.0 ASPIRATION PNEUMONIA DUE TO VOMITUS (HCC): Status: ACTIVE | Noted: 2020-01-01

## 2020-04-29 NOTE — ADDENDUM NOTE
Addendum  created 04/29/20 0854 by Boyd Neff MD    Attestation recorded in Intraprocedure, Intraprocedure Attestations filed

## 2020-05-01 PROBLEM — Z51.5 ADMISSION FOR HOSPICE CARE: Status: ACTIVE | Noted: 2020-01-01

## 2020-05-01 NOTE — PROGRESS NOTES
[facility] Visit Report    Manuela Gonzalez  [id number]  5/1/2020    Admission R/T [facility] Dx:yes    Reason for [facility] Admission:Cellulitis of the lower right leg    Symptom  Management: pain, SOA and congestion    Nursing/Medication Recommendations:    Psychosocial Issues and Recommendations:    Spiritual Concerns and Recommendations:    [facility] Discharge Plans:  Nothing at this time    Review of Visit (Include All Collaboration- including names of hospital and family involved during admission/visit):RN arrived on the unit and received report from Robe WALTERS. RN and SW arrived at bedside. Patient is being bathed by RN and PCA. Daughter is at bedside. After patient was bathed, patient was SOA with congestion and wheezing. RN attempted to ask patient questions and patient did not respond. Daughter states patient is hard of hearing. Daughter states she had a conversation with the patient this morning. Patient's PPS is 10%.  Gilbert is present and urine is shannan in color. Patient's extremities are cold to touch. Patient has required 2mg of IV morphine x3 in the last 24 hours. Daughter has no questions or concerns at this time.        Alphonso Wilson RN

## 2020-05-01 NOTE — PROGRESS NOTES
Case Management Discharge Note      Final Note: Admitted to a [facility] scattered bed on 4/30/2020. KELI Alvarenga RN, CCP.    Provided Post Acute Provider List?: Refused  Refused Provider List Comment: Had already arranged [facility] to initiate when pt was admitted, declines other CMS information    Destination - Selection Complete      Service Provider Request Status Selected Services Address Phone Number Fax Number    [facility] Selected Inpatient Hospice [address], [place] [address] [phone]      Durable Medical Equipment      No service has been selected for the patient.      Dialysis/Infusion      No service has been selected for the patient.      Home Medical Care - Selection Complete      Service Provider Request Status Selected Services Address Phone Number Fax Number    [facility] [address], [address], [place] [address] [phone]      Therapy      No service has been selected for the patient.      Community Resources      No service has been selected for the patient.             Final Discharge Disposition Code: 51 - hospice medical facility

## 2020-05-01 NOTE — PROGRESS NOTES
[facility] Visit Report    Manuela Gonzalez  [id number]  2020      Review of Visit (Include All Collaboration- including names of hospital and family involved during admission/visit): [facility] We met with patient and daughter Wendi at bedside. Patient was receiving services from staff RN and CNA. Upon completion Alphonso spoke to patient. I observed her to be non-verbal and lethargic but she would open eyes. Daughter displayed some anxiety about disease process. Emotional support and validation of feelings provided.  arrangements not discussed at this visit. SW will follow-up with family as needed.        Jose R Michelle, LONDONW   [facility] Clinical

## 2020-05-01 NOTE — DISCHARGE SUMMARY
Patient Name: Manuela Gonzalez  : 1923  MRN: [id number]    Date of Admission: 2020  Date of Discharge:  2020  Primary Care Physician: Val Crawford MD      Chief Complaint:   Wound Infection      Discharge Diagnoses     Active Hospital Problems    Diagnosis  POA   • **Cellulitis of right lower extremity [L03.115]  Yes   • Aspiration pneumonia due to vomitus (CMS/Formerly [facility]) [J69.0]  Unknown   • Acute respiratory failure with hypoxia (CMS/[facility]ly [facility]) [J96.01]  Unknown   • ISADORA (acute kidney injury) (CMS/Formerly [facility]) [N17.9]  Unknown   • MRSA (methicillin resistant Staphylococcus aureus) septicemia (CMS/HCC) [A41.02]  Unknown   • Metabolic acidosis [E87.2]  Unknown   • SBO (small bowel obstruction) (CMS/[facility]) [K56.609]  Unknown   • Anemia [D64.9]  Unknown   • Hypokalemia [E87.6]  No   • Severe aortic stenosis [I35.0]  Unknown   • HTN (hypertension) [I10]  Yes   • Hypothyroidism [E03.9]  Yes   • Surgical wound infection [T81.49XA]  Yes      Resolved Hospital Problems    Diagnosis Date Resolved POA   • Periumbilical hernia [K42.9] 2020 Unknown   • Hyponatremia [E87.1] 2020 Yes        Hospital Course     Ms. Gonzalez is a 89 y.o. female with a history of ankle fracture s/p repair several months ago who presented to [facility] initially complaining of worsening ankle wound noted by HH.  Please see the admitting H&P for further details.  She was found to have cellulitis and wound infection and was admitted to the hospital for further evaluation and treatment.  She was started on antibiotics and seen by ID. Blood cultures grew MRSA and wound grew both MRSA and Proteus. After a few days here in the hospital, she developed acute onset of N/V and abdl pain and CT showed incarcerated umbilical hernia. She was taken to OR by Dr Torres. Postop she developed Afib with RVR and Cardiology was consulted and meds were adjusted. Diet was advanced on POD 2 and unfortunately she did not tolerate this well. She  developed nausea and vomiting again and NG tube had to be replaced. She develoepd worsening o2 sats and went into acute hypoxic respiratory failure, likely from aspiration pneumonitis. She was started on broad spectrum abx and pulm was consulted. Pt was titrated up to 15L to maintain sats. Dr Mireles d/w patient's dtr and they elected to make her comfort care only. She was moved to palliative unit last night and has actually improved some today, now requiring only 4-5 L to keep sats up and she is more alert, requesting water. She has been seen by Hospice and accepted into scattered bed status. Will monitor trend and see if she cont to improve as she did today, or if she declines again.      Day of Discharge     Physical Exam:  Temp:  [95.7 °F (35.4 °C)-97.1 °F (36.2 °C)] 97.1 °F (36.2 °C)  Heart Rate:  [76-80] 80  Resp:  [18-20] 20  BP: (100-125)/(44-62) 125/62  Body mass index is 32.96 kg/m².  Physical Exam   SEE PROGRESS NOTE FROM TODAY    Consultants     Consult Orders (all) (From admission, onward)     Start     Ordered    04/30/20 1329  Inpatient Hospice / [facility] Consult  Once     Specialty:  Hospice and Palliative Medicine  Provider:  (Not yet assigned)    04/30/20 1328    04/29/20 1221  Inpatient Palliative Care Nurse Consult  Once     Provider:  (Not yet assigned)    04/29/20 1221    04/28/20 2017  Inpatient Pulmonology Consult  STAT,   Status:  Canceled     Specialty:  Pulmonary Disease  Provider:  Ramiro Contreras MD    04/28/20 2017 04/28/20 0412  Inpatient Cardiology Consult  Once,   Status:  Canceled     Specialty:  Cardiology  Provider:  Js Maurer MD    04/28/20 0411    04/26/20 0946  Inpatient General Surgery Consult  Once     Specialty:  General Surgery  Provider:  Manoj Hagen MD    04/26/20 0946    04/25/20 1453  Inpatient Infectious Diseases Consult  Once,   Status:  Canceled     Specialty:  Infectious Diseases  Provider:  William Tavarez MD    04/25/20 1455     04/24/20 1515  Inpatient Infectious Diseases Consult  Once     Specialty:  Infectious Diseases  Provider:  William Tavarez MD    04/24/20 1514    04/23/20 2231  Inpatient Case Management  Consult  Once     Provider:  (Not yet assigned)    04/23/20 2230    04/23/20 2138  Inpatient Orthopedic Surgery Consult  Once     Specialty:  Orthopedic Surgery  Provider:  Js Montalvo Jr., MD    04/23/20 2142 04/23/20 1858  [facility] (on-call MD unless specified)  Once     Specialty:  Internal Medicine  Provider:  (Not yet assigned)    04/23/20 1857              Procedures     UMBILICAL HERNIA REPAIR      Imaging Results (All)     Procedure Component Value Units Date/Time    XR Chest 1 View [[id number]] Collected:  04/29/20 0515     Updated:  04/29/20 0523    Narrative:       PORTABLE CHEST 04/29/2020 AT 0416 HOURS     CLINICAL HISTORY: Follow-up aspiration pneumonia.     Compared to the previous chest x-ray dated 04/02/2020.     There is extensive dense consolidation within the inferior two thirds of  the left lung consistent with pneumonia that is new since the preceding  chest x-ray. Ill-defined airspace infiltrate is also present in the  inferior aspect of the right lung that is new. The heart is largely  obscured by the infiltrate, but does not appear markedly enlarged. No  significant pleural effusion is identified. A nasogastric tube is in  place in good position.     This report was finalized on 4/29/2020 5:20 AM by Dr. Kwasi Hoffman M.D.       CT Abdomen Pelvis Without Contrast [[id number]] Collected:  04/25/20 2353     Updated:  04/26/20 1932    Addenda:        ADDENDUM TO CT SCAN OF THE ABDOMEN AND PELVIS DATED 04/25/2020.     There are multiple segments of mildly distended air and fluid-filled  small bowel within the abdomen and pelvis consistent with small bowel  obstruction, likely due to the periumbilical hernia.     This report was finalized on 4/26/2020 7:28 PM by Dr. Kwasi Hoffman   M.D.     Signed:  04/26/20 1928 by Kwasi Hoffman MD    Narrative:       CT ABDOMEN PELVIS WO CONTRAST-     CLINICAL HISTORY: Abdomen pain. Nausea and vomiting.     TECHNIQUE: Spiral CT images were acquired through the abdomen and pelvis  with no oral or IV contrast and were reconstructed in 3 mm thick axial  slices.     Radiation dose reduction techniques were utilized, including automated  exposure control and exposure modulation based on body size.     COMPARISON: CT imaging of the pelvis dated 04/02/2020     FINDINGS:  The liver, spleen, pancreas, and adrenal glands appear within  normal limits. There is a 5.9 cm diameter exophytic simple renal cysts  protruding superiorly from the upper pole of the left kidney. The  kidneys are otherwise unremarkable. The gallbladder is absent. There is  a small amount of ascites adjacent to the liver and spleen and also  within the pelvis. There is a small periumbilical hernia containing a  short segment of small bowel. This produces no obstruction. Numerous  diverticuli are present within the sigmoid colon. The colon is otherwise  unremarkable. Images through the lung bases demonstrate a small to  moderate-sized bilateral posteriorly layering pleural effusions. There  is a chronic compression deformity of the superior endplate of the L2  vertebral body.       Impression:       Small amount of ascites within the abdomen and pelvis. Small  to moderate-sized bilateral pleural effusions. Small periumbilical  hernia containing a short segment of small bowel producing obstruction.  Moderately extensive sigmoid diverticulosis without evidence  diverticulitis. Left renal cyst.     This report was finalized on 4/26/2020 12:02 AM by Dr. Kwasi Hoffman M.D.       XR Ankle 3+ View Right [[id number]] Collected:  04/25/20 1249     Updated:  04/25/20 1253    Narrative:       XR ANKLE 3+ VW RIGHT-     INDICATIONS: Removal of hardware           TECHNIQUE: 3 views of the right ankle        COMPARISON: 04/23/2020     FINDINGS:     Plate and screw hardware has been removed from the distal fibula, as  well as syndesmotic screw. Medial malleolus are surgical screws remain.  No acute fracture, erosion, or dislocation is noted. The bones are  diffusely osteopenic. Moderate to prominent calcaneal spurring. Soft  tissue swelling is present about the ankle, laterally more than  medially. Surgical soft tissue gas is noted. Arterial calcifications are  present.       Impression:          Postsurgical changes.     This report was finalized on 4/25/2020 12:50 PM by Dr. Boyd Myers M.D.       XR Ankle 3+ View Right [[id number]] Collected:  04/23/20 1843     Updated:  04/23/20 1848    Narrative:       RIGHT ANKLE X-RAYS     CLINICAL HISTORY: Right ankle wound. Infection.     3 views of the right ankle demonstrate internal fixation of an oblique  fracture of the distal fibula and also transverse fracture of the medial  malleolus with plate and multiple screws. The major fracture segments.  Satisfactory alignment. Ankle mortise is intact. No acute fractures are  identified. A plantar calcaneal heel spur is noted. There is moderate  osteopenia. Vascular calcification is noted.     This report was finalized on 4/23/2020 6:45 PM by Dr. Kwasi Hoffman M.D.              Results for orders placed during the hospital encounter of 04/23/20   Adult Transthoracic Echo Complete W/ Cont if Necessary Per Protocol    Narrative · Left ventricular systolic function is normal. Calculated EF = 57.0%.  · Left ventricular diastolic dysfunction is noted (grade I) consistent   with impaired relaxation  · Normal right ventricular cavity size and systolic function noted.  · Left atrial cavity size is moderately dilated.  · There is a mobile echodensity in the right atrium attached to the   lateral wall which likely represents a Chiari network.  · Severe aortic valve stenosis is present.  · Aortic valve maximum pressure gradient is  66.0 mmHg. Aortic valve mean   pressure gradient is 39.0 mmHg. Aortic valve area is 0.72 cm2.  · There is moderate to severe mitral annular calcification.  · Mild mitral valve stenosis is present  · There is severe plaque noted in the abdominal aorta.  · There is no evidence of pericardial effusion.        Pertinent Labs     Results from last 7 days   Lab Units 04/29/20 0347 04/28/20 0729 04/27/20  1019 04/26/20  0615   WBC 10*3/mm3 22.95* 12.31* 15.10* 18.64*   HEMOGLOBIN g/dL 11.1* 10.2* 9.5* 11.0*   PLATELETS 10*3/mm3 644* 508* 530* 635*     Results from last 7 days   Lab Units 04/29/20 0347 04/28/20 0729 04/27/20 1019 04/26/20  0615   SODIUM mmol/L 135* 131* 138 134*   POTASSIUM mmol/L 4.0 3.8 4.0 4.4   CHLORIDE mmol/L 103 100 103 99   CO2 mmol/L 19.4* 26.1 23.6 15.9*   BUN mg/dL 23 20 16 9   CREATININE mg/dL 2.01* 1.35* 0.80 0.66   GLUCOSE mg/dL 115* 103* 136* 105*   Estimated Creatinine Clearance: 16.8 mL/min (A) (by C-G formula based on SCr of 2.01 mg/dL (H)).  Results from last 7 days   Lab Units 04/28/20  0729 04/27/20  1019   ALBUMIN g/dL 1.90* 2.30*   BILIRUBIN mg/dL 0.3  --    ALK PHOS U/L 46  --    AST (SGOT) U/L 7  --    ALT (SGPT) U/L <5  --      Results from last 7 days   Lab Units 04/29/20 0347 04/28/20 0729 04/27/20  1019 04/26/20  0615  04/24/20  0447   CALCIUM mg/dL 8.6 8.5 8.5 8.6   < > 8.1*   ALBUMIN g/dL  --  1.90* 2.30*  --   --   --    MAGNESIUM mg/dL  --  2.0 1.5*  --   --  1.6*   PHOSPHORUS mg/dL  --   --  2.5  --   --   --     < > = values in this interval not displayed.       Results from last 7 days   Lab Units 04/28/20  0729   TROPONIN T ng/mL 0.032*     Results from last 7 days   Lab Units 04/29/20  0347   URIC ACID mg/dL 7.1*         Invalid input(s): LDLCALC  Results from last 7 days   Lab Units 04/25/20  1438 04/24/20  1306 04/24/20  0639   BLOODCX  No growth at 5 days  No growth at 5 days  --   --    WOUNDCX   --  Light growth (2+) Proteus mirabilis*  Light growth (2+)  Staphylococcus aureus, MRSA*  Rare Normal Skin [place] Light growth (2+) Proteus mirabilis*  Light growth (2+) Staphylococcus aureus, MRSA*  Rare Normal Skin Lazara       Test Results Pending at Discharge       Discharge Details        Discharge Medications      ASK your doctor about these medications      Instructions Start Date   docusate sodium 100 MG capsule   100 mg, Oral, 2 Times Daily      gabapentin 800 MG tablet  Commonly known as:  NEURONTIN   800 mg, Oral, 3 Times Daily      levothyroxine 112 MCG tablet  Commonly known as:  SYNTHROID, LEVOTHROID  Ask about: Which instructions should I use?   112 mcg, Oral, Every Morning Before Breakfast      metoprolol succinate XL 25 MG 24 hr tablet  Commonly known as:  TOPROL-XL   12.5 mg, Oral, Daily      pantoprazole 40 MG EC tablet  Commonly known as:  PROTONIX   40 mg, Oral, Daily             Allergies   Allergen Reactions   • Penicillins Angioedema         Discharge Disposition:      Discharge Diet:  Diet Order   Procedures   • NPO Diet NPO Except: Sips With Meds, Ice Chips       Discharge Activity:       CODE STATUS:    Code Status and Medical Interventions:   Ordered at: 04/29/20 0913     Level Of Support Discussed With:    Health Care Surrogate    Next of Kin (If No Surrogate)     Code Status:    No CPR     Medical Interventions (Level of Support Prior to Arrest):    Comfort Measures     Comments:    daughter       No future appointments.  Contact information for after-discharge care     Home Medical Care     [facility]    Service:  Home Health Services  Contact information:  [address], [address]  [facility] [address] [phone]                       Time Spent on Discharge:  Greater than 30 minutes      Kevin Langley MD  [facility]  04/30/20  8:05 PM

## 2020-05-01 NOTE — PLAN OF CARE
Problem: Patient Care Overview  Goal: Plan of Care Review  Outcome: Ongoing (interventions implemented as appropriate)  Flowsheets  Taken 4/30/2020 2202  Plan of Care Reviewed With: patient  Taken 5/1/2020 0506  Outcome Summary: pt calm and comfortable for majority of the shift, some SOA noted prn meds given per mar, robinol given and tolerated well relieving SOa, pt daughter called with status update, plan for her to call tomorrow to discuss visitaion, pt turned q4 and tolerated well. will cont to neil

## 2020-05-01 NOTE — PROGRESS NOTES
[facility]  [facility]    Manuela Gonzalez  [id number]  5/1/2020    Review of Visit (Include All Collaboration- including names of hospital and family involved during admission/visit):  Pt neal Wendi at bedside with pt when [facility] visited; pt sleeping, awakened to name, [place], denied pain, lethargic, some confusion at times, pt asked for water, Wendi using sponge stick, later [facility] SELENA Nagel arrived, indicated pt's orders had changed and pt now not allowed ice chips or water except by sponge or during meds; [facility] discussed spiritual support (Lutheran, non-practicing), grief counseling availability, self care, and encouraged Wendi to call with any concerns.      Abdulaziz Wan, [facility]

## 2020-05-01 NOTE — PLAN OF CARE
Problem: Patient Care Overview  Goal: Plan of Care Review  Outcome: Ongoing (interventions implemented as appropriate)  Flowsheets (Taken 5/1/2020 1611)  Progress: no change  Plan of Care Reviewed With: patient  Outcome Summary: pt resting comfortably during this shift. did experience SOA after bath and turn was completed and PRN medications given and pt tolerated well. daughter at bedside for a few hours during this shift while hospice consult was complete. will con't to monitor.

## 2020-05-01 NOTE — H&P
Patient Name:  Manuela Gonzalez  YOB: 1923  MRN:  [id number]  Admit Date:  4/30/2020  Patient Care Team:  Val Crawford MD as PCP - General (Internal Medicine)      Subjective   History Present Illness     No chief complaint on file.      Ms. Gonzalez is a 89 y.o. with a past medical history of hypertension and recent ankle fracture, repaired in January but with chronic wound, admitted on 4/23 with fever and worsening right ankle wound.  She was found to have MRSA bacteremia and wound cultures positive for both MRSA and Proteus.  During the hospital stay she developed an incarcerated umbilical hernia which was repaired by Dr. Torres.  Postoperatively she went into A. fib with RVR and was transferred to telemetry.  Diet was attempted to advance but the patient developed nausea and vomiting and worsening acute hypoxic respiratory failure.  She was titrated up to 15 L and after discussion of goals of care she was made palliative/comfort care.  Yesterday hospice was consulted and decision was to admit the patient into scattered bed status.  Today she looks a little worse.  She is a little more groggy and less interactive.  Her speech is more difficult to understand.         History of Present Illness  Review of Systems   Unable to perform ROS: Other   HENT: Positive for hearing loss.    Respiratory: Positive for shortness of breath. Negative for cough.    Gastrointestinal: Positive for abdominal pain. Negative for nausea and vomiting.   Psychiatric/Behavioral: Negative for agitation.   Patient extremely hard of hearing so full review of systems difficult to obtain    Personal History     Past Medical History:   Diagnosis Date   • Hypertension    • Macular degeneration      Past Surgical History:   Procedure Laterality Date   • ANKLE OPEN REDUCTION INTERNAL FIXATION Right 1/17/2020    Procedure: BIMALLEOLAR FRACTURE OPEN REDUCTION INTERNAL FIXATION;  Surgeon: Js Montalvo Jr., MD;  Location: [facility]  MAIN OR;  Service: Orthopedics   • APPENDECTOMY     • DIAGNOSTIC LAPAROSCOPY      for ovarian cyst, when patient was 15 years old   • HARDWARE REMOVAL Right 4/24/2020    Procedure: RT.ANKLEHARDWARE REMOVAL;  Surgeon: sJ Montalvo Jr., MD;  Location: [facility] MAIN OR;  Service: Orthopedics;  Laterality: Right;   • INCISION AND DRAINAGE LEG Right 4/24/2020    Procedure: RT. I&D ANKLE;  Surgeon: Js Montalvo Jr., MD;  [facility];  Service: Orthopedics;  Laterality: Right;   • UMBILICAL HERNIA REPAIR N/A 4/26/2020    Procedure: UMBILICAL HERNIA REPAIR;  Surgeon: Juancarlos Torres MD;  Location: [facility] MAIN OR;  Service: General;  Laterality: N/A;     Family History   Problem Relation Age of Onset   • Malig Hyperthermia Neg Hx      Social History     Tobacco Use   • Smoking status: Never Smoker   • Smokeless tobacco: Never Used   Substance Use Topics   • Alcohol use: Not Currently   • Drug use: Never     Current Facility-Administered Medications on File Prior to Encounter   Medication Dose Route Frequency Provider Last Rate Last Dose   • [DISCONTINUED] acetaminophen (TYLENOL) 160 MG/5ML solution 650 mg  650 mg Oral Q4H PRN Alejo Mireles MD       • [DISCONTINUED] acetaminophen (TYLENOL) suppository 650 mg  650 mg Rectal Q4H PRN Alejo Mireles MD       • [DISCONTINUED] acetaminophen (TYLENOL) tablet 650 mg  650 mg Oral Q4H PRN Juancarlos Torres MD   650 mg at 04/28/20 1125   • [DISCONTINUED] acetaminophen (TYLENOL) tablet 650 mg  650 mg Oral Q4H PRN Alejo Mireles MD       • [DISCONTINUED] atropine 1 % ophthalmic solution 2 drop  2 drop Sublingual BID PRN Alejo Mireles MD       • [DISCONTINUED] diphenoxylate-atropine (LOMOTIL) 2.5-0.025 MG per tablet 1 tablet  1 tablet Oral Q2H PRN Alejo Mireles MD       • [DISCONTINUED] Glycerin-Hypromellose- (ARTIFICIAL TEARS) 0.2-0.2-1 % ophthalmic solution solution 1 drop  1 drop Both Eyes Q30 Min PRN Aline  Alejo Weinstein MD       • [DISCONTINUED] glycopyrrolate PF (ROBINUL) injection 0.2 mg  0.2 mg Intravenous Q2H PRN Alejo Mireles MD       • [DISCONTINUED] glycopyrrolate PF (ROBINUL) injection 0.2 mg  0.2 mg Subcutaneous Q2H PRN Alejo Mireles MD       • [DISCONTINUED] glycopyrrolate PF (ROBINUL) injection 0.4 mg  0.4 mg Intravenous Q2H PRN Alejo Mireles MD   0.4 mg at 04/30/20 0558   • [DISCONTINUED] glycopyrrolate PF (ROBINUL) injection 0.4 mg  0.4 mg Subcutaneous Q2H PRN Alejo Mireles MD   0.4 mg at 04/29/20 1657   • [DISCONTINUED] haloperidol (HALDOL) 2 MG/ML solution 1 mg  1 mg Oral Q4H PRN Alejo Mireles MD       • [DISCONTINUED] haloperidol (HALDOL) tablet 1 mg  1 mg Oral Q4H PRN Alejo Mireles MD       • [DISCONTINUED] haloperidol lactate (HALDOL) injection 1 mg  1 mg Subcutaneous Q4H PRAlejo Chambers MD       • [DISCONTINUED] HYDROmorphone (DILAUDID) injection 0.5 mg  0.5 mg Intravenous Q1H PRAlejo Chambers MD       • [DISCONTINUED] HYDROmorphone (DILAUDID) injection 1 mg  1 mg Intravenous Q1H PRAlejo Chambers MD       • [DISCONTINUED] HYDROmorphone (DILAUDID) injection 1.5 mg  1.5 mg Intravenous Q1H PRN Alejo Mireles MD       • [DISCONTINUED] lactated ringers infusion  9 mL/hr Intravenous Continuous Juancarlos Torres MD   Stopped at 04/26/20 1807   • [DISCONTINUED] LORazepam (ATIVAN) 2 MG/ML concentrated solution 0.5 mg  0.5 mg Sublingual Q1H PRAlejo Chambers MD       • [DISCONTINUED] LORazepam (ATIVAN) 2 MG/ML concentrated solution 1 mg  1 mg Sublingual Q1H PRN Alejo Mireles MD       • [DISCONTINUED] LORazepam (ATIVAN) 2 MG/ML concentrated solution 2 mg  2 mg Sublingual Q1H PRN Alejo Mireles MD       • [DISCONTINUED] LORazepam (ATIVAN) injection 0.5 mg  0.5 mg Intravenous Q1H PRN Alejo Mireles MD       • [DISCONTINUED] LORazepam (ATIVAN) injection 1 mg  1 mg Intravenous Q1H PRN Aline,  Alejo Weinstein MD       • [DISCONTINUED] LORazepam (ATIVAN) injection 2 mg  2 mg Intravenous Q1H PRN Alejo Mireles MD       • [DISCONTINUED] metoprolol tartrate (LOPRESSOR) injection 5 mg  5 mg Intravenous Once Dayana Hunt APRN   Stopped at 04/28/20 0643   • [DISCONTINUED] morphine concentrated solution solution 10 mg  10 mg Sublingual Q1H PRN Alejo Mireles MD       • [DISCONTINUED] morphine concentrated solution solution 20 mg  20 mg Sublingual Q1H PRN Alejo Mireles MD       • [DISCONTINUED] morphine concentrated solution solution 5 mg  5 mg Sublingual Q1H PRN Alejo Mireles MD       • [DISCONTINUED] morphine injection 2 mg  2 mg Intravenous Q1H PRN Alejo Mireles MD   2 mg at 04/30/20 1211   • [DISCONTINUED] morphine injection 4 mg  4 mg Intravenous Q1H PRAlejo Chambers MD       • [DISCONTINUED] Morphine sulfate (PF) injection 6 mg  6 mg Intravenous Q1H PRN Alejo Mireles MD       • [DISCONTINUED] Scopolamine (TRANSDERM-SCOP) 1.5 MG/3DAYS patch 1 patch  1 patch Transdermal Q72H PRN Alejo Mireles MD         Current Outpatient Medications on File Prior to Encounter   Medication Sig Dispense Refill   • docusate sodium 100 MG capsule Take 100 mg by mouth 2 (Two) Times a Day for 30 days. (Patient taking differently: Take 100 mg by mouth Daily As Needed (constipation).) 60 each 0   • gabapentin (NEURONTIN) 800 MG tablet Take 800 mg by mouth 3 (Three) Times a Day.     • levothyroxine (SYNTHROID, LEVOTHROID) 112 MCG tablet Take 112 mcg by mouth Every Morning Before Breakfast.     • metoprolol succinate XL (TOPROL-XL) 25 MG 24 hr tablet Take 0.5 tablets by mouth Daily for 30 days. 15 tablet 0   • pantoprazole (PROTONIX) 40 MG EC tablet Take 1 tablet by mouth Daily for 30 days. 30 tablet 0     Allergies   Allergen Reactions   • Penicillins Angioedema       Objective    Objective     Vital Signs  Temp:  [97.1 °F (36.2 °C)-97.2 °F (36.2 °C)] 97.2 °F (36.2  °C)  Heart Rate:  [80-81] 81  Resp:  [20] 20  BP: (118-125)/(61-62) 118/61  SpO2:  [97 %-99 %] 99 %  on  Flow (L/min):  [4] 4;   Device (Oxygen Therapy): nasal cannula  Body mass index is 32.97 kg/m².    Physical Exam   Constitutional: She has a sickly appearance. She appears ill. Nasal cannula in place.   Neck: Neck supple. No JVD present.   Cardiovascular: Regular rhythm.   Murmur heard.  Pulmonary/Chest: Effort normal. She has wheezes. She has rales.   Abdominal: Soft. Bowel sounds are normal. She exhibits no distension. There is tenderness (Appropriate). There is no guarding.   Musculoskeletal: Normal range of motion. She exhibits no edema.   Right foot wrapped   Neurological: She is alert.   Skin: Skin is warm and dry. No rash noted.   Psychiatric: She has a normal mood and affect.   Nursing note and vitals reviewed.      Results Review:  I reviewed the patient's new clinical results.  I reviewed the patient's new imaging results and agree with the interpretation.  I reviewed the patient's other test results and agree with the interpretation  I personally viewed and interpreted the patient's EKG/Telemetry data  Discussed with ED provider.    Lab Results (last 24 hours)     ** No results found for the last 24 hours. **          Imaging Results (Last 24 Hours)     ** No results found for the last 24 hours. **          Results for orders placed during the hospital encounter of 04/23/20   Adult Transthoracic Echo Complete W/ Cont if Necessary Per Protocol    Narrative · Left ventricular systolic function is normal. Calculated EF = 57.0%.  · Left ventricular diastolic dysfunction is noted (grade I) consistent   with impaired relaxation  · Normal right ventricular cavity size and systolic function noted.  · Left atrial cavity size is moderately dilated.  · There is a mobile echodensity in the right atrium attached to the   lateral wall which likely represents a Chiari network.  · Severe aortic valve stenosis is  present.  · Aortic valve maximum pressure gradient is 66.0 mmHg. Aortic valve mean   pressure gradient is 39.0 mmHg. Aortic valve area is 0.72 cm2.  · There is moderate to severe mitral annular calcification.  · Mild mitral valve stenosis is present  · There is severe plaque noted in the abdominal aorta.  · There is no evidence of pericardial effusion.          No orders to display        Assessment/Plan     Active Hospital Problems    Diagnosis  POA   • **Admission for hospice care [Z51.5]  Not Applicable   • Aspiration pneumonia due to vomitus (CMS/[facility]) [J69.0]  Yes   • Acute respiratory failure with hypoxia (CMS/[facility]) [J96.01]  Yes   • MRSA (methicillin resistant Staphylococcus aureus) septicemia (CMS/[facility]) [A41.02]  Yes   • SBO (small bowel obstruction) (CMS/[facility]) [K56.609]  Yes   • Severe aortic stenosis [I35.0]  Yes   • Cellulitis of right lower extremity [L03.115]  Yes      Resolved Hospital Problems   No resolved problems to display.     Continue full palliative care for now.  Discussed with 1 of her daughters at bedside.  Patient improved somewhat from 4/29 to 4/30, but looks a little worse again today.  O2 requirements have been stable.  In light of her multitude of ongoing medical issues, I still feel she is most appropriate for hospice care.  Her daughter has asked that I call her other sister Seda who is more of the decision maker, whom I have been speaking with previously.  They are both still mulling over the question of whether they would like to resume antibiotics to treat her sepsis but continuing of course DNR status and otherwise palliative care.     ADDENDUM: Spoke to dtr Seda by phone, as well as RN. Pt seems to be declining through the course of the day. Will continue comfort measures.          Kevin Langley MD  [facility]  05/01/20  09:56

## 2020-05-02 NOTE — PLAN OF CARE
Problem: Patient Care Overview  Goal: Plan of Care Review  Outcome: Ongoing (interventions implemented as appropriate)  Medicated prior to turns for comfort. Patient rested peacefully today. Daughter at bedside. Will cont. To monitor and follow current orders.

## 2020-05-02 NOTE — PROGRESS NOTES
Name: Manuela Gonzalez ADMIT: 2020   : 1923  PCP: Val Crawford MD    MRN: [id number] LOS: 2 days   AGE/SEX: 89 y.o. female  [facility]     Subjective   Subjective   Sleeping all morning.  Medicated a few times overnight    Review of Systems     Objective   Objective   Vital Signs  Temp:  [97.6 °F (36.4 °C)-97.7 °F (36.5 °C)] 97.6 °F (36.4 °C)  Heart Rate:  [84-95] 84  Resp:  [20] 20  BP: (109-119)/(58-61) 119/58  SpO2:  [95 %-96 %] 96 %  on  Flow (L/min):  [4] 4;   Device (Oxygen Therapy): nasal cannula  Body mass index is 32.97 kg/m².  Physical Exam   Constitutional: She is sleeping. She has a sickly appearance. She appears ill. Nasal cannula in place.   Cardiovascular: Regular rhythm.   Murmur heard.  Pulmonary/Chest: Effort normal. No respiratory distress. She has wheezes. She has rales.   Abdominal: Soft. Bowel sounds are normal. She exhibits distension. There is tenderness.   Musculoskeletal: Normal range of motion. She exhibits no edema.   Right foot wrapped   Skin: Skin is warm and dry. No rash noted.   Nursing note and vitals reviewed.      Results Review:       I reviewed the patient's new clinical results.  Results from last 7 days   Lab Units 20  0729 20  1019 20  0615   WBC 10*3/mm3 22.95* 12.31* 15.10* 18.64*   HEMOGLOBIN g/dL 11.1* 10.2* 9.5* 11.0*   PLATELETS 10*3/mm3 644* 508* 530* 635*     Results from last 7 days   Lab Units 20  03420  0729 20  1019 20  0615   SODIUM mmol/L 135* 131* 138 134*   POTASSIUM mmol/L 4.0 3.8 4.0 4.4   CHLORIDE mmol/L 103 100 103 99   CO2 mmol/L 19.4* 26.1 23.6 15.9*   BUN mg/dL 23 20 16 9   CREATININE mg/dL 2.01* 1.35* 0.80 0.66   GLUCOSE mg/dL 115* 103* 136* 105*   Estimated Creatinine Clearance: 16.8 mL/min (A) (by C-G formula based on SCr of 2.01 mg/dL (H)).  Results from last 7 days   Lab Units 20  0729 20  1019   ALBUMIN g/dL 1.90* 2.30*   BILIRUBIN mg/dL 0.3  --    ALK  PHOS U/L 46  --    AST (SGOT) U/L 7  --    ALT (SGPT) U/L <5  --      Results from last 7 days   Lab Units 04/29/20  0347 04/28/20  0729 04/27/20  1019 04/26/20  0615   CALCIUM mg/dL 8.6 8.5 8.5 8.6   ALBUMIN g/dL  --  1.90* 2.30*  --    MAGNESIUM mg/dL  --  2.0 1.5*  --    PHOSPHORUS mg/dL  --   --  2.5  --      Results from last 7 days   Lab Units 04/27/20  1019   LACTATE mmol/L 1.1     No results found for: HGBA1C, POCGLU        NPO Diet NPO Except: Sips With Meds, Ice Chips       Assessment/Plan     Active Hospital Problems    Diagnosis  POA   • **Admission for hospice care [Z51.5]  Not Applicable   • Aspiration pneumonia due to vomitus (CMS/[facility]) [J69.0]  Yes   • Acute respiratory failure with hypoxia (CMS/[facility]) [J96.01]  Yes   • MRSA (methicillin resistant Staphylococcus aureus) septicemia (CMS/[facility]) [A41.02]  Yes   • SBO (small bowel obstruction) (CMS/[facility]) [K56.609]  Yes   • Severe aortic stenosis [I35.0]  Yes   • Cellulitis of right lower extremity [L03.115]  Yes      Resolved Hospital Problems   No resolved problems to display.     Continue full comfort measures.  Patient appears comfortable  Daughter at bedside.  Discussed with her.  She is still in agreement with overall plan of care.        Kevin Langley MD  [facility]  05/02/20  15:13

## 2020-05-02 NOTE — PLAN OF CARE
Problem: Patient Care Overview  Goal: Plan of Care Review  Outcome: Ongoing (interventions implemented as appropriate)  Flowsheets (Taken 5/2/2020 2383)  Progress: no change  Plan of Care Reviewed With: patient  Outcome Summary: P rested well during shift, medicated x1 for pain with turn, will cont. to monitor and provide comfort care

## 2020-05-02 NOTE — PROGRESS NOTES
[facility] Visit Report    Manuela Gonzalez  [id number]  5/2/2020    Admission R/T [facility] Dx: Yes    Reason for [facility] Admission: Cellulitis RLE    Symptom  Management: Pain control, SOA and congestion    Nursing/Medication Recommendations: Continue to medicate prior to turns and PRN as indicated    Psychosocial Issues and Recommendations:    Spiritual Concerns and Recommendations:    [facility] Discharge Plans:  No plans for discharge at this time. Patient continues with decline; she is a PPS of 10% and is entering into the active phases of dying. Patient is being medicated with IV Morphine and IV Robinul for pain control, SOA and congestion. Will continue to monitor    Review of Visit: Reviewed v/s, medications and notes in Epic. Initially I am unable to receive an update from facility RN. Upon arrival to room patient is lying in right recovery position. Patient's daughter, Seda, is at the bedside. Patient is non responsive to verbal or physical stimuli; Seda reports patient became non responsive sometime after yesterday morning. Patient appears comfortable and peaceful at this time. Respirations are even and unlabored on O2 at 4L/NC for comfort. Upon auscultation lungs are clear, diminished in bases. During my visit patient is noted with cough and mild congestion noted. This RN called out for RN to medicate with IV Robinul. Abdomen is soft with closed surgical incision at umbilicus. RLE noted in surgical dressing that is CDI. Mild edema noted to RLE. F/C is present with straw colored urine with mild sediment. During my visit I provided support to daughter with active listening. SELENA Graf returned to room with IV Robinul and IV Morphine; this RN assisted Homar in turning patient to left recovery position. Daughter is appreciative of care. In the last 24 hours patient has received Ativan 0.5mg IV x1 dose, Morphine 2mg IV PRN x3 doses, and Robinul 0.4mg IV PRN x3 doses. Discussed EOL care with daughter, she v/u.  Will continue to visit daily, assess needs of patient/family and provide support         Sera Rodriguez RN  [facility] Visit Nurse

## 2020-05-03 NOTE — PLAN OF CARE
Problem: Patient Care Overview  Goal: Plan of Care Review  Outcome: Ongoing (interventions implemented as appropriate)  Flowsheets (Taken 5/3/2020 0245)  Progress: declining  Plan of Care Reviewed With: patient  Outcome Summary: Pt has been unresponsive tonight. Congested at times, have been turning in recovery position. Premedicated for turns. Spoke with daughter earlier in shift, updated on pt declining condition. Will monitor.

## 2020-05-03 NOTE — DISCHARGE SUMMARY
[facility]    Date of Discharge:  5/3/2020    PCP: Val Crawford MD    Discharge Diagnosis:   Active Hospital Problems    Diagnosis  POA   • **Admission for hospice care [Z51.5]  Not Applicable   • Aspiration pneumonia due to vomitus (CMS/[facility]) [J69.0]  Yes   • Acute respiratory failure with hypoxia (CMS/[facility]) [J96.01]  Yes   • MRSA (methicillin resistant Staphylococcus aureus) septicemia (CMS/[facility]) [A41.02]  Yes   • SBO (small bowel obstruction) (CMS/[facility]) [K56.609]  Yes   • Severe aortic stenosis [I35.0]  Yes   • Cellulitis of right lower extremity [L03.115]  Yes      Resolved Hospital Problems   No resolved problems to display.     Procedures Performed       Consulting Physician(s)             None          Hospital Course  Please see history and physical for details. Patient is a 89 y.o. female admitted initially  due to cellulitis and what turned out to be MRSA bacteremia related to a chronic right ankle wound.  Please see the acute care discharge summary  for details of the acute hospitalization.  She decompensated after having an episode of nausea and vomiting and likely developed aspiration pneumonia with severe hypoxia and was transferred to palliative care for comfort measures after discussion with daughter.  She has been kept comfortable over the last couple of days per palliative protocol and  this afternoon with family at bedside.      Time of death: 3:03 PM 5/3/2020    Kevin Langley MD  20  15:26

## 2020-05-03 NOTE — PROGRESS NOTES
Name: Manuela Gonzalez ADMIT: 2020   : 1923  PCP: Val Crawford MD    MRN: [id number] LOS: 3 days   AGE/SEX: 89 y.o. female  ROOM: [facility]     Subjective   Subjective   Declining. Agonal resps at times per staff    Review of Systems     Objective   Objective   Vital Signs  Temp:  [96.7 °F (35.9 °C)-101.9 °F (38.8 °C)] 101.9 °F (38.8 °C)  Heart Rate:  [] 133  Resp:  [20-24] 24  BP: (95-98)/(49-52) 98/52  SpO2:  [51 %-94 %] 51 %  on  Flow (L/min):  [2-4] 2;   Device (Oxygen Therapy): nasal cannula  Body mass index is 32.97 kg/m².  Physical Exam   Constitutional: She is sleeping. She has a sickly appearance. She appears ill. Nasal cannula in place.   Cardiovascular: Regular rhythm.   Murmur heard.  Pulmonary/Chest: Accessory muscle usage present. No respiratory distress. She has decreased breath sounds. She has rhonchi.   Abdominal: Soft. She exhibits distension. There is tenderness.   Musculoskeletal: Normal range of motion. She exhibits no edema.   Right foot wrapped   Skin: Skin is dry. No rash noted.   Cool, dusky, cyanotic   Nursing note and vitals reviewed.      Results Review:       I reviewed the patient's new clinical results.  Results from last 7 days   Lab Units 20  03420  0720  1019   WBC 10*3/mm3 22.95* 12.31* 15.10*   HEMOGLOBIN g/dL 11.1* 10.2* 9.5*   PLATELETS 10*3/mm3 644* 508* 530*     Results from last 7 days   Lab Units 20  0347 20  0729 20  1019   SODIUM mmol/L 135* 131* 138   POTASSIUM mmol/L 4.0 3.8 4.0   CHLORIDE mmol/L 103 100 103   CO2 mmol/L 19.4* 26.1 23.6   BUN mg/dL 23 20 16   CREATININE mg/dL 2.01* 1.35* 0.80   GLUCOSE mg/dL 115* 103* 136*   Estimated Creatinine Clearance: 16.8 mL/min (A) (by C-G formula based on SCr of 2.01 mg/dL (H)).  Results from last 7 days   Lab Units 20  0729 20  1019   ALBUMIN g/dL 1.90* 2.30*   BILIRUBIN mg/dL 0.3  --    ALK PHOS U/L 46  --    AST (SGOT) U/L 7  --    ALT (SGPT) U/L <5   --      Results from last 7 days   Lab Units 04/29/20  0347 04/28/20  0729 04/27/20  1019   CALCIUM mg/dL 8.6 8.5 8.5   ALBUMIN g/dL  --  1.90* 2.30*   MAGNESIUM mg/dL  --  2.0 1.5*   PHOSPHORUS mg/dL  --   --  2.5     Results from last 7 days   Lab Units 04/27/20  1019   LACTATE mmol/L 1.1     No results found for: HGBA1C, POCGLU        NPO Diet NPO Except: Sips With Meds, Ice Chips       Assessment/Plan     Active Hospital Problems    Diagnosis  POA   • **Admission for hospice care [Z51.5]  Not Applicable   • Aspiration pneumonia due to vomitus (CMS/[facility]) [J69.0]  Yes   • Acute respiratory failure with hypoxia (CMS/[facility]) [J96.01]  Yes   • MRSA (methicillin resistant Staphylococcus aureus) septicemia (CMS/[facility]) [A41.02]  Yes   • SBO (small bowel obstruction) (CMS/[facility]) [K56.609]  Yes   • Severe aortic stenosis [I35.0]  Yes   • Cellulitis of right lower extremity [L03.115]  Yes      Resolved Hospital Problems   No resolved problems to display.     Continue full comfort measures.  Not long now, likely to pass in the next few hours. Patient appears comfortable/peaceful.    Daughters at bedside.  Discussed with them.        Kevin Langley MD  [facility]  05/03/20  14:51

## 2020-05-03 NOTE — PROGRESS NOTES
"[facility] Visit Report    Manuela Gonzalez  [id number]  5/3/2020    Admission R/T [facility] Dx: Yes    Reason for [facility] Admission: Cellulitis RLE, Aspiration PNA    Symptom  Management: Pain control, respiratory distress/SOA and congestion    Nursing/Medication Recommendations: Continue comfort care through EOL    Psychosocial Issues and Recommendations:    Spiritual Concerns and Recommendations:    [facility]  No orders for discharge. Patient is in the active phases of dying and requires frequent RN rounding to assess comfort. Patient is receiving IV medications prior to turns to maintain her current level of comfort.    Review of Visit: Reviewed v/s, medications and notes in [facility]. Upon arrival to room, two daughters are at the bedside. Patient is lying in left recovery position. She appears to be in the active phases of dying. Seda reports, \"they called us in early this morning, her oxygen level is way down\". Patient is ashen in color with dusky nailbeds. She is warm to touch; T max this morning is 101.9. Respirations are mildly labored at 24; she is noted with a mild \"snore\". No audible congestion at this time. Seda reports, \"she had a lot of congestion when we first got here\". Edema is noted to bilateral hands and bilateral feet. Mild mottling noted to knees and feet. RLE noted in dressing that is clear, dry and intact. F/C noted with a significant decrease in u/o. D/W both daughters signs and and symptoms of the dying process. I offered support with answering questions and active listening. I offered a  visit, however both daughters declined. Patient is being medicated prior to turns. In the last 24 hours patient has received Ativan 0.5mg IV PRN x2 doses, Morphine 2mg IV PRN x4 doses, and Robinul 0.4mg IV PRN x4 doses. Family will be using [facility] for services. Will continue to visit daily, assess needs of patient/family and provdie support        Sera Rodriguez RN  [facility] " Visit Nurse

## 2020-05-04 NOTE — PROGRESS NOTES
Case Management Discharge Note      Final Note: The patient  on 5/3/2020 @ 15:03. B. Colette RN, CCP         Destination - Selection Complete      Service Provider Request Status Selected Services Address Phone Number Fax Number    [facility] [address], [place] [id number] [phone]      Durable Medical Equipment      No service has been selected for the patient.      Dialysis/Infusion      No service has been selected for the patient.      Home Medical Care      No service has been selected for the patient.      Therapy      No service has been selected for the patient.      Community Resources      No service has been selected for the patient.             Final Discharge Disposition Code: 41 -  in medical facility

## 2023-04-05 NOTE — PLAN OF CARE
Problem: Patient Care Overview  Goal: Plan of Care Review  Outcome: Ongoing (interventions implemented as appropriate)  Flowsheets  Taken 1/21/2020 0323  Progress: improving  Outcome Summary: Patient alert and oriented. No complaints of SOB or nausea. Complaints of pain to right leg/ankle, PRN pain medication given. Turned q2. Daughter at bedside at all times, attenative to patient. Vital signs stable. No acute distress noted. Will continue to monitor.  Taken 1/21/2020 0043  Plan of Care Reviewed With: patient;daughter      Nursing home

## (undated) DEVICE — PK ORTHO MAJ 40

## (undated) DEVICE — PENCL E/S ULTRAVAC TELESCP NOSE HOLSTR 10FT

## (undated) DEVICE — STCKNT IMPERV 12IN STRL

## (undated) DEVICE — GLV SURG PREMIERPRO ORTHO LTX PF SZ8 BRN

## (undated) DEVICE — UNDERCAST PADDING: Brand: DEROYAL

## (undated) DEVICE — DRILL BIT, SOLID CORE, 2.8MM: Brand: MEDLINE

## (undated) DEVICE — BNDG ELAS CO-FLEX SLF ADHR 4IN5YD LF STRL

## (undated) DEVICE — PAD,ABDOMINAL,8"X10",ST,LF: Brand: MEDLINE

## (undated) DEVICE — SPNG LAP 18X18IN LF STRL PK/5

## (undated) DEVICE — DRP C/ARMOR

## (undated) DEVICE — PK ORTHO MINOR 40

## (undated) DEVICE — DRILL BIT, CANN, AO/QC, HOOK SCRW, 3.0MM: Brand: MEDLINEUNITE

## (undated) DEVICE — TOWEL,OR,DSP,ST,BLUE,STD,4/PK,20PK/CS: Brand: MEDLINE

## (undated) DEVICE — SUT ETHLN 2/0 PS 18IN 585H

## (undated) DEVICE — GLV SURG TRIUMPH CLASSIC PF LTX 8 STRL

## (undated) DEVICE — PREMIUM WET SKIN PREP TRAY: Brand: MEDLINE INDUSTRIES, INC.

## (undated) DEVICE — BNDG ELAS ELITE V/CLOSE 6IN 5YD LF STRL

## (undated) DEVICE — SOL ISO/ALC RUB 70PCT 4OZ

## (undated) DEVICE — DRAPE,U/ SHT,SPLIT,PLAS,STERIL: Brand: MEDLINE

## (undated) DEVICE — GUIDEWIRE, NON-THREADED, 1.4 X 150MM: Brand: PENDING

## (undated) DEVICE — DRSNG GZ PETROLTM XEROFORM CURAD 1X8IN STRL

## (undated) DEVICE — INTENDED FOR TISSUE SEPARATION, AND OTHER PROCEDURES THAT REQUIRE A SHARP SURGICAL BLADE TO PUNCTURE OR CUT.: Brand: BARD-PARKER ® DISPOSABLE SCALPELS

## (undated) DEVICE — ANTIBACTERIAL UNDYED BRAIDED (POLYGLACTIN 910), SYNTHETIC ABSORBABLE SUTURE: Brand: COATED VICRYL

## (undated) DEVICE — PAD GRND REM POLYHESIVE A/ DISP

## (undated) DEVICE — SUT ETHLN 3/0 PSL BLK MONO SA 30IN 1691H

## (undated) DEVICE — DRSNG BURN ACTICOAT FLEX 7 1X24IN

## (undated) DEVICE — SUT PDS 0 CT2 27IN DYED Z334H

## (undated) DEVICE — STPLR SKIN VISISTAT WD 35CT

## (undated) DEVICE — LAB CORP DEHYDRANT RGT/GR ALC 70PCT GAL

## (undated) DEVICE — SUT PDS 2 0 CT1 27IN CLR Z259H

## (undated) DEVICE — LOU MINOR PROCEDURE: Brand: MEDLINE INDUSTRIES, INC.

## (undated) DEVICE — APPL CHLORAPREP HI/LITE 26ML ORNG

## (undated) DEVICE — BNDG ESMARK 4IN 12FT LF STRL BLU

## (undated) DEVICE — SPNG GZ WOVN 4X4IN 12PLY 10/BX STRL

## (undated) DEVICE — GLV SURG BIOGEL LTX PF 8

## (undated) DEVICE — SUT VIC 0 CT1 36IN J946H

## (undated) DEVICE — STCKNT IMPERV 9X36IN STRL

## (undated) DEVICE — PROXIMATE RH ROTATING HEAD SKIN STAPLERS (35 WIDE) CONTAINS 35 STAINLESS STEEL STAPLES: Brand: PROXIMATE

## (undated) DEVICE — TRAP FLD MINIVAC MEGADYNE 100ML

## (undated) DEVICE — NDL HYPO PRECISIONGLIDE REG 25G 1 1/2

## (undated) DEVICE — DISPOSABLE TOURNIQUET CUFF SINGLE BLADDER, SINGLE PORT AND QUICK CONNECT CONNECTOR: Brand: COLOR CUFF

## (undated) DEVICE — SUT MNCRYL PLS ANTIB UD 4/0 PS2 18IN

## (undated) DEVICE — GOWN,PREVENTION PLUS,XLONG/XLARGE,STRL: Brand: MEDLINE

## (undated) DEVICE — POOLE SUCTION HANDLE: Brand: CARDINAL HEALTH

## (undated) DEVICE — DRSNG PAD ABD 8X10IN STRL

## (undated) DEVICE — U-DRAPE: Brand: CONVERTORS

## (undated) DEVICE — PIN, TEMP FIX, SMOOTH, 1.1 X 15MM: Brand: PENDING

## (undated) DEVICE — PICO 7 10CM X 30CM: Brand: PICO™ 7

## (undated) DEVICE — T-DRAPE,EXTREMITY,STERILE: Brand: MEDLINE

## (undated) DEVICE — SUT ETHLN 3/0 PC5 18IN 1893G

## (undated) DEVICE — CONTAINER,SPECIMEN,OR STERILE,4OZ: Brand: MEDLINE

## (undated) DEVICE — GLV SURG SENSICARE PI PF LF 8.5 GRN STRL

## (undated) DEVICE — SUT VIC 2/0 CT2 27IN J269H

## (undated) DEVICE — ST IRR CYSTO W/SPK 77IN LF

## (undated) DEVICE — GLV SURG BIOGEL LTX PF 7 1/2

## (undated) DEVICE — APPL CHLORAPREP W/TINT 26ML ORNG

## (undated) DEVICE — SUT PDS 0 CT1 36IN Z346H